# Patient Record
Sex: MALE | Race: WHITE | Employment: OTHER | URBAN - METROPOLITAN AREA
[De-identification: names, ages, dates, MRNs, and addresses within clinical notes are randomized per-mention and may not be internally consistent; named-entity substitution may affect disease eponyms.]

---

## 2017-04-19 ENCOUNTER — TELEPHONE (OUTPATIENT)
Dept: FAMILY MEDICINE CLINIC | Age: 72
End: 2017-04-19

## 2018-03-31 ENCOUNTER — HOSPITAL ENCOUNTER (OUTPATIENT)
Dept: CT IMAGING | Age: 73
Discharge: HOME | End: 2018-03-31
Attending: OTOLARYNGOLOGY | Admitting: OTOLARYNGOLOGY

## 2018-03-31 DIAGNOSIS — H93.A9: ICD-10-CM

## 2018-03-31 DIAGNOSIS — H93.19 TINNITUS: ICD-10-CM

## 2019-02-15 ENCOUNTER — OFFICE VISIT (OUTPATIENT)
Dept: ORTHOPEDIC SURGERY | Age: 74
End: 2019-02-15
Payer: MEDICARE

## 2019-02-15 VITALS
SYSTOLIC BLOOD PRESSURE: 131 MMHG | WEIGHT: 315 LBS | HEART RATE: 91 BPM | DIASTOLIC BLOOD PRESSURE: 76 MMHG | BODY MASS INDEX: 44.1 KG/M2 | HEIGHT: 71 IN

## 2019-02-15 DIAGNOSIS — M17.11 PRIMARY OSTEOARTHRITIS OF RIGHT KNEE: Primary | ICD-10-CM

## 2019-02-15 PROCEDURE — 99203 OFFICE O/P NEW LOW 30 MIN: CPT | Performed by: ORTHOPAEDIC SURGERY

## 2019-12-28 ENCOUNTER — APPOINTMENT (OUTPATIENT)
Dept: GENERAL RADIOLOGY | Age: 74
End: 2019-12-28
Payer: MEDICARE

## 2019-12-28 ENCOUNTER — HOSPITAL ENCOUNTER (EMERGENCY)
Age: 74
Discharge: HOME | End: 2019-12-28
Attending: EMERGENCY MEDICINE
Payer: MEDICARE

## 2019-12-28 VITALS
OXYGEN SATURATION: 99 % | BODY MASS INDEX: 43.4 KG/M2 | HEART RATE: 85 BPM | HEIGHT: 71 IN | DIASTOLIC BLOOD PRESSURE: 47 MMHG | SYSTOLIC BLOOD PRESSURE: 136 MMHG | RESPIRATION RATE: 20 BRPM | WEIGHT: 310 LBS | TEMPERATURE: 97.4 F

## 2019-12-28 DIAGNOSIS — M84.48XA PATHOLOGICAL FRACTURE OF OTHER SITE, UNSPECIFIED PATHOLOGICAL CAUSE, INITIAL ENCOUNTER: ICD-10-CM

## 2019-12-28 DIAGNOSIS — S42.012A: Primary | ICD-10-CM

## 2019-12-28 PROCEDURE — 71101 X-RAY EXAM UNILAT RIBS/CHEST: CPT

## 2019-12-28 PROCEDURE — 6360000002 HC RX W HCPCS: Performed by: EMERGENCY MEDICINE

## 2019-12-28 PROCEDURE — 96372 THER/PROPH/DIAG INJ SC/IM: CPT

## 2019-12-28 PROCEDURE — 73000 X-RAY EXAM OF COLLAR BONE: CPT

## 2019-12-28 PROCEDURE — 99283 EMERGENCY DEPT VISIT LOW MDM: CPT

## 2019-12-28 RX ORDER — KETOROLAC TROMETHAMINE 30 MG/ML
60 INJECTION, SOLUTION INTRAMUSCULAR; INTRAVENOUS ONCE
Status: COMPLETED | OUTPATIENT
Start: 2019-12-28 | End: 2019-12-28

## 2019-12-28 RX ORDER — ACETAMINOPHEN AND CODEINE PHOSPHATE 300; 30 MG/1; MG/1
1-2 TABLET ORAL EVERY 6 HOURS PRN
Qty: 25 TABLET | Refills: 0 | Status: SHIPPED | OUTPATIENT
Start: 2019-12-28 | End: 2020-01-11

## 2019-12-28 RX ADMIN — KETOROLAC TROMETHAMINE 60 MG: 30 INJECTION, SOLUTION INTRAMUSCULAR at 15:56

## 2019-12-28 ASSESSMENT — PAIN SCALES - GENERAL
PAINLEVEL_OUTOF10: 8
PAINLEVEL_OUTOF10: 8

## 2019-12-28 ASSESSMENT — PAIN DESCRIPTION - LOCATION: LOCATION: SHOULDER

## 2019-12-28 ASSESSMENT — PAIN DESCRIPTION - ORIENTATION: ORIENTATION: LEFT

## 2020-04-09 ENCOUNTER — HOSPITAL ENCOUNTER (EMERGENCY)
Age: 75
Discharge: HOME | End: 2020-04-09
Attending: EMERGENCY MEDICINE
Payer: MEDICARE

## 2020-04-09 VITALS
WEIGHT: 315 LBS | OXYGEN SATURATION: 99 % | DIASTOLIC BLOOD PRESSURE: 43 MMHG | RESPIRATION RATE: 17 BRPM | TEMPERATURE: 97.7 F | BODY MASS INDEX: 43.96 KG/M2 | HEART RATE: 87 BPM | SYSTOLIC BLOOD PRESSURE: 97 MMHG

## 2020-04-09 LAB
BILIRUBIN URINE: NEGATIVE
BLOOD, URINE: NEGATIVE
CLARITY: CLEAR
COLOR: YELLOW
EPITHELIAL CELLS, UA: NORMAL /HPF (ref 0–5)
GLUCOSE URINE: NEGATIVE MG/DL
KETONES, URINE: NEGATIVE MG/DL
LEUKOCYTE ESTERASE, URINE: ABNORMAL
MICROSCOPIC EXAMINATION: YES
NITRITE, URINE: NEGATIVE
PH UA: 5 (ref 5–8)
PROTEIN UA: NEGATIVE MG/DL
RBC UA: NORMAL /HPF (ref 0–4)
SPECIFIC GRAVITY UA: 1.02 (ref 1–1.03)
URINE TYPE: ABNORMAL
UROBILINOGEN, URINE: 0.2 E.U./DL
WBC UA: NORMAL /HPF (ref 0–5)

## 2020-04-09 PROCEDURE — 6370000000 HC RX 637 (ALT 250 FOR IP): Performed by: EMERGENCY MEDICINE

## 2020-04-09 PROCEDURE — 99283 EMERGENCY DEPT VISIT LOW MDM: CPT

## 2020-04-09 PROCEDURE — 81001 URINALYSIS AUTO W/SCOPE: CPT

## 2020-04-09 RX ORDER — PREDNISONE 20 MG/1
60 TABLET ORAL ONCE
Status: COMPLETED | OUTPATIENT
Start: 2020-04-09 | End: 2020-04-09

## 2020-04-09 RX ORDER — CYCLOBENZAPRINE HYDROCHLORIDE 7.5 MG/1
7.5 TABLET, FILM COATED ORAL 3 TIMES DAILY PRN
Qty: 30 TABLET | Refills: 0 | Status: SHIPPED | OUTPATIENT
Start: 2020-04-09 | End: 2020-04-19

## 2020-04-09 RX ORDER — LIDOCAINE 50 MG/G
1 PATCH TOPICAL DAILY
Qty: 15 PATCH | Refills: 1 | Status: SHIPPED | OUTPATIENT
Start: 2020-04-09 | End: 2020-04-24

## 2020-04-09 RX ORDER — METHYLPREDNISOLONE 4 MG/1
TABLET ORAL
Qty: 1 KIT | Refills: 0 | Status: SHIPPED | OUTPATIENT
Start: 2020-04-09 | End: 2021-05-14

## 2020-04-09 RX ADMIN — PREDNISONE 60 MG: 20 TABLET ORAL at 11:20

## 2020-04-09 ASSESSMENT — PAIN DESCRIPTION - PAIN TYPE
TYPE: ACUTE PAIN;CHRONIC PAIN
TYPE: ACUTE PAIN

## 2020-04-09 ASSESSMENT — PAIN DESCRIPTION - LOCATION
LOCATION: BACK
LOCATION: BACK

## 2020-04-09 ASSESSMENT — PAIN SCALES - GENERAL
PAINLEVEL_OUTOF10: 7
PAINLEVEL_OUTOF10: 6

## 2020-04-09 ASSESSMENT — PAIN DESCRIPTION - DESCRIPTORS: DESCRIPTORS: ACHING

## 2020-10-06 ENCOUNTER — HOSPITAL ENCOUNTER (EMERGENCY)
Age: 75
Discharge: HOME | End: 2020-10-06
Attending: EMERGENCY MEDICINE
Payer: MEDICARE

## 2020-10-06 VITALS
RESPIRATION RATE: 16 BRPM | HEART RATE: 89 BPM | DIASTOLIC BLOOD PRESSURE: 56 MMHG | OXYGEN SATURATION: 95 % | SYSTOLIC BLOOD PRESSURE: 101 MMHG | TEMPERATURE: 98.3 F | BODY MASS INDEX: 43.24 KG/M2 | WEIGHT: 310 LBS

## 2020-10-06 PROCEDURE — 90471 IMMUNIZATION ADMIN: CPT | Performed by: EMERGENCY MEDICINE

## 2020-10-06 PROCEDURE — 90715 TDAP VACCINE 7 YRS/> IM: CPT | Performed by: EMERGENCY MEDICINE

## 2020-10-06 PROCEDURE — 99283 EMERGENCY DEPT VISIT LOW MDM: CPT

## 2020-10-06 PROCEDURE — 6360000002 HC RX W HCPCS: Performed by: EMERGENCY MEDICINE

## 2020-10-06 RX ADMIN — TETANUS TOXOID, REDUCED DIPHTHERIA TOXOID AND ACELLULAR PERTUSSIS VACCINE, ADSORBED 0.5 ML: 5; 2.5; 8; 8; 2.5 SUSPENSION INTRAMUSCULAR at 16:49

## 2020-10-06 ASSESSMENT — PAIN DESCRIPTION - LOCATION: LOCATION: ARM

## 2020-10-06 ASSESSMENT — PAIN DESCRIPTION - PAIN TYPE: TYPE: ACUTE PAIN

## 2020-10-06 ASSESSMENT — PAIN DESCRIPTION - ORIENTATION: ORIENTATION: LEFT

## 2020-10-06 NOTE — ED NOTES
Pt states understanding of d/c instructions. Pt alert and oriented with steady gait upon discharge.       Gilberto Cox RN  10/06/20 0881

## 2020-10-06 NOTE — ED PROVIDER NOTES
Pulse: 89   Resp: 16   Temp: 98.3 °F (36.8 °C)   SpO2: 95%       GENERAL: Patient is well-developed, well-nourished,  no acute distress. Mild apparent discomfort. Non toxic appearing. HEENT:  Normocephalic, atraumatic. PERRL. Conjunctiva appear normal.  External ears are normal.  MMM  NECK: Supple with normal ROM. Trachea midline  LUNGS:  Normal work of breathing. Speaking comfortably in full sentences. EXTREMITIES: 2+ distal pulses w/o edema. MUSCULOSKELETAL:  Atraumatic extremities with normal ROM grossly. No obvious bony deformities. SKIN: Large skin tear noted to left forearm approximately 6 x 8 cm. Jagged edges. Otherwise, warm/dry. No rashes/lesions noted. PSYCHIATRIC: Patient is alert and oriented with normal affect  NEUROLOGIC: Cranial nerves grossly intact. Moves all extremities with equal strength. No gross sensory deficits. Answers questions/follows commands appropriately. ED COURSE/MDM  Nursing notes reviewed. Pt was given the following medications or treatments in the ED:       Wound care: Wound was cleaned by ER tech. Partially avulsed skin flaps were approximated by myself as best possible prior to Mepitel placement by ER tech. Clinical Impression  Based on the presenting complaint, history, and physical exam, multiple diagnoses were considered. Exam and workup here most c/w:  1. Fall on same level from tripping    2. Skin tear of left forearm without complication, initial encounter        I discussed with Raquel Brand the results of evaluation in the ED, diagnosis, care, and prognosis. The plan is to discharge to home. Patient is in agreement with plan and questions have been answered. I also discussed with Raquel Brand the reasons which may require a return visit and the importance of follow-up care. The patient is well-appearing, nontoxic, and improved at the time of discharge. Patient agrees to call to arrange follow-up care as directed.    Raquel Brand understands to return immediately for worsening/change in symptoms. Patient will be started on the following medications from the ED:  Discharge Medication List as of 10/6/2020  5:03 PM            Disposition  Pt is discharged in stable condition.     Disposition Vitals:  BP (!) 101/56   Pulse 89   Temp 98.3 °F (36.8 °C) (Oral)   Resp 16   Wt (!) 310 lb (140.6 kg)   SpO2 95%   BMI 43.24 kg/m²                     Konrad Mayorga DO  10/06/20 0897

## 2020-10-06 NOTE — ED NOTES
Cleaned left arm skin tears with hibiclens and saline. RT then placed Mepitel, non adherent gauze, and kurlex on left arm. Pt. Tolerated well.       Mono Sorto, JONATHON  10/06/20 3395

## 2020-12-03 ENCOUNTER — HOSPITAL ENCOUNTER (OUTPATIENT)
Dept: MRI IMAGING | Age: 75
Discharge: HOME | End: 2020-12-03
Payer: MEDICARE

## 2020-12-03 PROCEDURE — 72148 MRI LUMBAR SPINE W/O DYE: CPT

## 2020-12-30 ENCOUNTER — HOSPITAL ENCOUNTER (EMERGENCY)
Age: 75
Discharge: HOME | End: 2020-12-30
Attending: EMERGENCY MEDICINE
Payer: MEDICARE

## 2020-12-30 VITALS
DIASTOLIC BLOOD PRESSURE: 55 MMHG | WEIGHT: 310 LBS | OXYGEN SATURATION: 100 % | TEMPERATURE: 97.8 F | SYSTOLIC BLOOD PRESSURE: 122 MMHG | BODY MASS INDEX: 43.4 KG/M2 | HEART RATE: 91 BPM | HEIGHT: 71 IN | RESPIRATION RATE: 22 BRPM

## 2020-12-30 PROCEDURE — 10060 I&D ABSCESS SIMPLE/SINGLE: CPT

## 2020-12-30 PROCEDURE — 99283 EMERGENCY DEPT VISIT LOW MDM: CPT

## 2020-12-30 RX ORDER — DOXYCYCLINE HYCLATE 100 MG
100 TABLET ORAL 2 TIMES DAILY
Qty: 14 TABLET | Refills: 0 | Status: SHIPPED | OUTPATIENT
Start: 2020-12-30 | End: 2021-01-06

## 2020-12-30 RX ORDER — PIOGLITAZONEHYDROCHLORIDE 45 MG/1
TABLET ORAL
COMMUNITY
Start: 2020-01-14

## 2020-12-30 ASSESSMENT — PAIN SCALES - GENERAL: PAINLEVEL_OUTOF10: 6

## 2020-12-30 ASSESSMENT — PAIN DESCRIPTION - PAIN TYPE: TYPE: ACUTE PAIN

## 2020-12-30 ASSESSMENT — PAIN DESCRIPTION - DESCRIPTORS: DESCRIPTORS: SORE

## 2020-12-30 ASSESSMENT — PAIN DESCRIPTION - LOCATION: LOCATION: HEAD

## 2020-12-30 ASSESSMENT — PAIN DESCRIPTION - ORIENTATION: ORIENTATION: LEFT;POSTERIOR

## 2020-12-30 NOTE — ED PROVIDER NOTES
methylPREDNISolone (MEDROL, DANII,) 4 MG tablet Take with food as directed until gone 4/9/20   Wilton MD Milka   NONFORMULARY     Historical Provider, MD       No Known Allergies    Social history:  reports that he has never smoked. He has never used smokeless tobacco. He reports that he does not drink alcohol. Family history:    Family History   Problem Relation Age of Onset    Arthritis Other     Diabetes Other        REVIEW OF SYSTEMS  6 systems reviewed, pertinent positives per HPI otherwise noted to be negative    PHYSICAL EXAM  Vitals:    12/30/20 1107   BP: (!) 122/55   Pulse:    Resp:    Temp:    SpO2:        GENERAL: Patient is well-developed, well-nourished,  no acute distress. Moderate apparent discomfort. Non toxic appearing. HEENT:  Normocephalic, atraumatic. PERRL. Conjunctiva appear normal.  External ears are normal.  MMM  NECK: Supple with normal ROM. Trachea midline  LUNGS:  Normal work of breathing. Speaking comfortably in full sentences. EXTREMITIES: 2+ distal pulses w/o edema. MUSCULOSKELETAL:  Atraumatic extremities with normal ROM grossly. No obvious bony deformities. SKIN: Mild inflammation along the left occiput. Small lesion that appears to be inflamed/infected cyst less than 1 cm. Otherwise, warm/dry. No rashes/lesions noted. PSYCHIATRIC: Patient is alert and oriented with normal affect  NEUROLOGIC: Cranial nerves grossly intact. Moves all extremities with equal strength. No gross sensory deficits. Answers questions/follows commands appropriately. ED COURSE/MDM  Nursing notes reviewed.   Pt was given the following medications or treatments in the ED:         PROCEDURE:  Stephanie Nolasco [address] Vinod Pena or their surrogate had an opportunity to ask questions, and the risks, benefits, and alternatives were discussed. The abscess was prepped and draped to maintain a sterile field. A local anesthetic was used to completely anesthetize the abscess. A stab incision was made at the site to allow for draining. There were no complications during the procedure. I discussed with patient that exam is consistent with an infected hair/infected cyst at the site. This was opened to allow for drainage. However, patient was encouraged to continue to monitor as symptoms may worsen and etiology may be reflective of shingles, though I suspect this is unlikely. Clinical Impression  Based on the presenting complaint, history, and physical exam, multiple diagnoses were considered. Exam and workup here most c/w:  1. Scalp abscess        I discussed with Vinodgregorio Pena the results of evaluation in the ED, diagnosis, care, and prognosis. The plan is to discharge to home. Patient is in agreement with plan and questions have been answered. I also discussed with Vinod Pena the reasons which may require a return visit and the importance of follow-up care. The patient is well-appearing, nontoxic, and improved at the time of discharge. Patient agrees to call to arrange follow-up care as directed. Vinod Pena understands to return immediately for worsening/change in symptoms. Patient will be started on the following medications from the ED:  Discharge Medication List as of 12/30/2020 11:43 AM      START taking these medications    Details   doxycycline hyclate (VIBRA-TABS) 100 MG tablet Take 1 tablet by mouth 2 times daily for 7 days, Disp-14 tablet, R-0Normal               Disposition  Pt is discharged in stable condition.     Disposition Vitals:  BP (!) 122/55   Pulse 91   Temp 97.8 °F (36.6 °C) (Oral)   Resp 22   Ht 5' 11\" (1.803 m)   Wt (!) 310 lb (140.6 kg)   SpO2 100%   BMI 43.24 kg/m² Geovanny Cee, DO  12/30/20 78 Monae Ashton, DO  12/30/20 1219

## 2021-05-14 ENCOUNTER — HOSPITAL ENCOUNTER (EMERGENCY)
Age: 76
Discharge: HOME | End: 2021-05-14
Attending: EMERGENCY MEDICINE
Payer: MEDICARE

## 2021-05-14 VITALS
OXYGEN SATURATION: 100 % | RESPIRATION RATE: 18 BRPM | SYSTOLIC BLOOD PRESSURE: 164 MMHG | BODY MASS INDEX: 46.03 KG/M2 | WEIGHT: 315 LBS | DIASTOLIC BLOOD PRESSURE: 83 MMHG | HEART RATE: 94 BPM | TEMPERATURE: 97.8 F

## 2021-05-14 DIAGNOSIS — Z71.1 FEARED CONDITION NOT DEMONSTRATED: ICD-10-CM

## 2021-05-14 DIAGNOSIS — N30.00 ACUTE CYSTITIS WITHOUT HEMATURIA: ICD-10-CM

## 2021-05-14 DIAGNOSIS — F51.5 NIGHTMARES: Primary | ICD-10-CM

## 2021-05-14 LAB
AMPHETAMINE SCREEN, URINE: ABNORMAL
BACTERIA: ABNORMAL /HPF
BARBITURATE SCREEN URINE: ABNORMAL
BENZODIAZEPINE SCREEN, URINE: ABNORMAL
BILIRUBIN URINE: NEGATIVE
BLOOD, URINE: NEGATIVE
C. TRACHOMATIS DNA ,URINE: NEGATIVE
CANNABINOID SCREEN URINE: ABNORMAL
CLARITY: CLEAR
COCAINE METABOLITE SCREEN URINE: ABNORMAL
COLOR: YELLOW
EPITHELIAL CELLS, UA: ABNORMAL /HPF (ref 0–5)
GLUCOSE BLD-MCNC: 140 MG/DL (ref 70–99)
GLUCOSE URINE: NEGATIVE MG/DL
KETONES, URINE: NEGATIVE MG/DL
LEUKOCYTE ESTERASE, URINE: ABNORMAL
Lab: ABNORMAL
METHADONE SCREEN, URINE: ABNORMAL
MICROSCOPIC EXAMINATION: YES
N. GONORRHOEAE DNA, URINE: NEGATIVE
NITRITE, URINE: POSITIVE
OPIATE SCREEN URINE: ABNORMAL
OXYCODONE URINE: POSITIVE
PERFORMED ON: ABNORMAL
PH UA: 6
PH UA: 6 (ref 5–8)
PHENCYCLIDINE SCREEN URINE: ABNORMAL
PROPOXYPHENE SCREEN: ABNORMAL
PROTEIN UA: NEGATIVE MG/DL
RBC UA: ABNORMAL /HPF (ref 0–4)
SPECIFIC GRAVITY UA: 1.02 (ref 1–1.03)
URINE REFLEX TO CULTURE: YES
URINE TYPE: ABNORMAL
UROBILINOGEN, URINE: 0.2 E.U./DL
WBC UA: ABNORMAL /HPF (ref 0–5)

## 2021-05-14 PROCEDURE — 87077 CULTURE AEROBIC IDENTIFY: CPT

## 2021-05-14 PROCEDURE — 80307 DRUG TEST PRSMV CHEM ANLYZR: CPT

## 2021-05-14 PROCEDURE — 81001 URINALYSIS AUTO W/SCOPE: CPT

## 2021-05-14 PROCEDURE — 87186 SC STD MICRODIL/AGAR DIL: CPT

## 2021-05-14 PROCEDURE — 87086 URINE CULTURE/COLONY COUNT: CPT

## 2021-05-14 PROCEDURE — 99282 EMERGENCY DEPT VISIT SF MDM: CPT

## 2021-05-14 PROCEDURE — 87591 N.GONORRHOEAE DNA AMP PROB: CPT

## 2021-05-14 PROCEDURE — 87491 CHLMYD TRACH DNA AMP PROBE: CPT

## 2021-05-14 RX ORDER — CEPHALEXIN 500 MG/1
500 CAPSULE ORAL 4 TIMES DAILY
Qty: 40 CAPSULE | Refills: 0 | Status: SHIPPED | OUTPATIENT
Start: 2021-05-14 | End: 2021-05-24

## 2021-05-14 NOTE — ED PROVIDER NOTES
asymptomatic when he awoke this morning. No other complaints, modifying factors or associated symptoms. Nursing notes reviewed. Past Medical History:   Diagnosis Date    Diabetes mellitus ([address]     Hyperlipidemia     Hypertension     Thyroid disease      Past Surgical History:   Procedure Laterality Date    ANKLE SURGERY Left     JOINT REPLACEMENT Bilateral     knee replacements    KNEE SURGERY Right      Family History   Problem Relation Age of Onset    Arthritis Other     Diabetes Other      Social History     Socioeconomic History    Marital status:      Spouse name: Not on file    Number of children: Not on file    Years of education: Not on file    Highest education level: Not on file   Occupational History    Not on file   Social Needs    Financial resource strain: Not on file    Food insecurity     Worry: Not on file     Inability: Not on file    Transportation needs     Medical: Not on file     Non-medical: Not on file   Tobacco Use    Smoking status: Never Smoker    Smokeless tobacco: Never Used   Substance and Sexual Activity    Alcohol use: No    Drug use: Not on file    Sexual activity: Not Currently   Lifestyle    Physical activity     Days per week: Not on file     Minutes per session: Not on file    Stress: Not on file   Relationships    Social connections     Talks on phone: Not on file     Gets together: Not on file     Attends Scientology service: Not on file     Active member of club or organization: Not on file     Attends meetings of clubs or organizations: Not on file     Relationship status: Not on file    Intimate partner violence     Fear of current or ex partner: Not on file     Emotionally abused: Not on file     Physically abused: Not on file     Forced sexual activity: Not on file   Other Topics Concern    Not on file   Social History Narrative    Not on file     No current facility-administered medications for this encounter.       Current Outpatient Medications   Medication Sig Dispense Refill    cephALEXin (KEFLEX) 500 MG capsule Take 1 capsule by mouth 4 times daily for 10 days 40 capsule 0    pioglitazone (ACTOS) 45 MG tablet TAKE 1 TABLET DAILY      oxyCODONE-Acetaminophen (PERCOCET PO) Take by mouth      LOSARTAN POTASSIUM PO Take by mouth      ATORVASTATIN CALCIUM PO Take by mouth      Levothyroxine Sodium (SYNTHROID PO) Take by mouth      MELOXICAM PO Take by mouth      ibuprofen (ADVIL;MOTRIN) 800 MG tablet Take 1 tablet by mouth every 8 hours as needed for Pain 40 tablet 0    NONFORMULARY        No Known Allergies    REVIEW OF SYSTEMS  6 systems reviewed, pertinent positives per HPI otherwise noted to be negative    PHYSICAL EXAM   BP (!) 164/83   Pulse 94   Temp 97.8 °F (36.6 °C) (Oral)   Resp 18   Wt (!) 330 lb (149.7 kg)   SpO2 100%   BMI 46.03 kg/m²    GENERAL APPEARANCE: Awake and alert. Cooperative. No acute distress. HEAD: Normocephalic. Atraumatic. EYES: PERRL. EOM's grossly intact. ENT: Mucous membranes are moist.   NECK: Supple. Normal ROM. CHEST: Equal symmetric chest rise. RRR  LUNGS: Breathing is unlabored. Speaking comfortably in full sentences. CTAB  ABDOMEN: Nondistended, nontender  EXTREMITIES: MAEE. No acute deformities. SKIN: Warm and dry. No acute rashes. NEUROLOGICAL: Alert and oriented. Strength is 5/5 in all extremities and sensation is intact. Gait normal.    LABS  I have reviewed all labs for this visit.    Results for orders placed or performed during the hospital encounter of 05/14/21   Urine Drug Screen   Result Value Ref Range    Amphetamine Screen, Urine Neg Negative <1000ng/mL    Barbiturate Screen, Ur Neg Negative <200 ng/mL    Benzodiazepine Screen, Urine Neg Negative <200 ng/mL    Cannabinoid Scrn, Ur Neg Negative <50 ng/mL    Cocaine Metabolite Screen, Urine Neg Negative <300 ng/mL    Opiate Scrn, Ur Neg Negative <300 ng/mL    PCP Screen, Urine Neg Negative <25 ng/mL    Methadone Screen, Urine Neg Negative <300 ng/mL    Propoxyphene Scrn, Ur Neg Negative <300 ng/mL    Oxycodone Urine POSITIVE (A) Negative <100 ng/ml    pH, UA 6.0     Drug Screen Comment: see below    Urinalysis Reflex to Culture    Specimen: Urine, clean catch   Result Value Ref Range    Color, UA Yellow Straw/Yellow    Clarity, UA Clear Clear    Glucose, Ur Negative Negative mg/dL    Bilirubin Urine Negative Negative    Ketones, Urine Negative Negative mg/dL    Specific Gravity, UA 1.020 1.005 - 1.030    Blood, Urine Negative Negative    pH, UA 6.0 5.0 - 8.0    Protein, UA Negative Negative mg/dL    Urobilinogen, Urine 0.2 <2.0 E.U./dL    Nitrite, Urine POSITIVE (A) Negative    Leukocyte Esterase, Urine SMALL (A) Negative    Microscopic Examination YES     Urine Type NotGiven     Urine Reflex to Culture Yes    Microscopic Urinalysis   Result Value Ref Range    WBC, UA  (A) 0 - 5 /HPF    RBC, UA 0-2 0 - 4 /HPF    Epithelial Cells, UA 0-1 0 - 5 /HPF    Bacteria, UA 4+ (A) None Seen /HPF   POCT Glucose   Result Value Ref Range    POC Glucose 140 (H) 70 - 99 mg/dl    Performed on ACCU-CHEK        ED COURSE/MDM  The patient's ED course was notable for nightmares while sleeping which he thought may be related to inadvertent use of some medication or drug other than what is prescribed to him. Tox screen only with intended oxycodone. Nothing suggests a concerning toxidrome currently. States his pill counts are correct and nothing else looks unusual to him. VS reassuring except mild hypertension. In review of his UA which looks infected, he tells me retrospectively that he \"maybe\" has some urinary symptoms of urgency. No dysuria or hematuria, abd pain, or testicular pain though. He thought it was a prostate issue. Not concerned about penile discharge or STD. In retrospect he would not have complained originally but does think the UA may be real.  I will prescribe him Keflex.   GC/CT added on but low suspicion so will

## 2021-05-14 NOTE — ED TRIAGE NOTES
Pt states his girlfriend is letting her son stay with them and he is a drug addict and feels he may have messed with his medication because he had hallucinations and nightmares last night and is concerned

## 2021-05-16 LAB
ORGANISM: ABNORMAL
URINE CULTURE, ROUTINE: ABNORMAL

## 2021-07-14 ENCOUNTER — HOSPITAL ENCOUNTER (INPATIENT)
Age: 76
LOS: 5 days | Discharge: HOME HEALTH | DRG: 291 | End: 2021-07-19
Attending: EMERGENCY MEDICINE | Admitting: INTERNAL MEDICINE
Payer: MEDICARE

## 2021-07-14 ENCOUNTER — APPOINTMENT (OUTPATIENT)
Dept: GENERAL RADIOLOGY | Age: 76
DRG: 291 | End: 2021-07-14
Payer: MEDICARE

## 2021-07-14 DIAGNOSIS — I50.9 CONGESTIVE HEART FAILURE, UNSPECIFIED HF CHRONICITY, UNSPECIFIED HEART FAILURE TYPE (HCC): ICD-10-CM

## 2021-07-14 DIAGNOSIS — I48.19 PERSISTENT ATRIAL FIBRILLATION (HCC): Primary | ICD-10-CM

## 2021-07-14 DIAGNOSIS — R77.8 ELEVATED TROPONIN: ICD-10-CM

## 2021-07-14 PROBLEM — I48.91 ATRIAL FIBRILLATION WITH RVR (HCC): Status: ACTIVE | Noted: 2021-07-14

## 2021-07-14 LAB
A/G RATIO: 1.4 (ref 1.1–2.2)
ALBUMIN SERPL-MCNC: 4 G/DL (ref 3.4–5)
ALP BLD-CCNC: 61 U/L (ref 40–129)
ALT SERPL-CCNC: 11 U/L (ref 10–40)
ANION GAP SERPL CALCULATED.3IONS-SCNC: 10 MMOL/L (ref 3–16)
AST SERPL-CCNC: 29 U/L (ref 15–37)
BASOPHILS ABSOLUTE: 0.2 K/UL (ref 0–0.2)
BASOPHILS RELATIVE PERCENT: 5.1 %
BILIRUB SERPL-MCNC: 0.5 MG/DL (ref 0–1)
BILIRUBIN URINE: NEGATIVE
BLOOD, URINE: NEGATIVE
BUN BLDV-MCNC: 54 MG/DL (ref 7–20)
CALCIUM SERPL-MCNC: 9.1 MG/DL (ref 8.3–10.6)
CHLORIDE BLD-SCNC: 100 MMOL/L (ref 99–110)
CLARITY: CLEAR
CO2: 25 MMOL/L (ref 21–32)
COLOR: YELLOW
CREAT SERPL-MCNC: 1.6 MG/DL (ref 0.8–1.3)
EOSINOPHILS ABSOLUTE: 0.1 K/UL (ref 0–0.6)
EOSINOPHILS RELATIVE PERCENT: 2.8 %
GFR AFRICAN AMERICAN: 51
GFR NON-AFRICAN AMERICAN: 42
GLOBULIN: 2.8 G/DL
GLUCOSE BLD-MCNC: 146 MG/DL (ref 70–99)
GLUCOSE URINE: NEGATIVE MG/DL
HCT VFR BLD CALC: 34.4 % (ref 40.5–52.5)
HEMOGLOBIN: 11.6 G/DL (ref 13.5–17.5)
KETONES, URINE: NEGATIVE MG/DL
LEUKOCYTE ESTERASE, URINE: NEGATIVE
LYMPHOCYTES ABSOLUTE: 1 K/UL (ref 1–5.1)
LYMPHOCYTES RELATIVE PERCENT: 26 %
MCH RBC QN AUTO: 34.6 PG (ref 26–34)
MCHC RBC AUTO-ENTMCNC: 33.7 G/DL (ref 31–36)
MCV RBC AUTO: 102.9 FL (ref 80–100)
MICROSCOPIC EXAMINATION: NORMAL
MONOCYTES ABSOLUTE: 0.4 K/UL (ref 0–1.3)
MONOCYTES RELATIVE PERCENT: 9.9 %
NEUTROPHILS ABSOLUTE: 2.2 K/UL (ref 1.7–7.7)
NEUTROPHILS RELATIVE PERCENT: 56.2 %
NITRITE, URINE: NEGATIVE
PDW BLD-RTO: 13.2 % (ref 12.4–15.4)
PH UA: 5 (ref 5–8)
PLATELET # BLD: 173 K/UL (ref 135–450)
PMV BLD AUTO: 9 FL (ref 5–10.5)
POTASSIUM REFLEX MAGNESIUM: 5.5 MMOL/L (ref 3.5–5.1)
PRO-BNP: 3018 PG/ML (ref 0–449)
PROTEIN UA: NEGATIVE MG/DL
RBC # BLD: 3.35 M/UL (ref 4.2–5.9)
SODIUM BLD-SCNC: 135 MMOL/L (ref 136–145)
SPECIFIC GRAVITY UA: 1.02 (ref 1–1.03)
TOTAL PROTEIN: 6.8 G/DL (ref 6.4–8.2)
TROPONIN: 0.04 NG/ML
URINE REFLEX TO CULTURE: NORMAL
URINE TYPE: NORMAL
UROBILINOGEN, URINE: 0.2 E.U./DL
WBC # BLD: 4 K/UL (ref 4–11)

## 2021-07-14 PROCEDURE — 2700000000 HC OXYGEN THERAPY PER DAY

## 2021-07-14 PROCEDURE — 71045 X-RAY EXAM CHEST 1 VIEW: CPT

## 2021-07-14 PROCEDURE — 2580000003 HC RX 258: Performed by: EMERGENCY MEDICINE

## 2021-07-14 PROCEDURE — 85025 COMPLETE CBC W/AUTO DIFF WBC: CPT

## 2021-07-14 PROCEDURE — 84484 ASSAY OF TROPONIN QUANT: CPT

## 2021-07-14 PROCEDURE — 83880 ASSAY OF NATRIURETIC PEPTIDE: CPT

## 2021-07-14 PROCEDURE — 2500000003 HC RX 250 WO HCPCS: Performed by: EMERGENCY MEDICINE

## 2021-07-14 PROCEDURE — 2060000000 HC ICU INTERMEDIATE R&B

## 2021-07-14 PROCEDURE — 96374 THER/PROPH/DIAG INJ IV PUSH: CPT

## 2021-07-14 PROCEDURE — 81003 URINALYSIS AUTO W/O SCOPE: CPT

## 2021-07-14 PROCEDURE — 80053 COMPREHEN METABOLIC PANEL: CPT

## 2021-07-14 PROCEDURE — 99283 EMERGENCY DEPT VISIT LOW MDM: CPT

## 2021-07-14 PROCEDURE — 93005 ELECTROCARDIOGRAM TRACING: CPT | Performed by: EMERGENCY MEDICINE

## 2021-07-14 RX ORDER — 0.9 % SODIUM CHLORIDE 0.9 %
250 INTRAVENOUS SOLUTION INTRAVENOUS ONCE
Status: COMPLETED | OUTPATIENT
Start: 2021-07-14 | End: 2021-07-14

## 2021-07-14 RX ORDER — DILTIAZEM HYDROCHLORIDE 5 MG/ML
20 INJECTION INTRAVENOUS ONCE
Status: COMPLETED | OUTPATIENT
Start: 2021-07-14 | End: 2021-07-14

## 2021-07-14 RX ORDER — FUROSEMIDE 40 MG/1
40 TABLET ORAL 2 TIMES DAILY
Status: ON HOLD | COMMUNITY
End: 2021-07-19 | Stop reason: SDUPTHER

## 2021-07-14 RX ADMIN — DILTIAZEM HYDROCHLORIDE 20 MG: 5 INJECTION INTRAVENOUS at 20:17

## 2021-07-14 RX ADMIN — SODIUM CHLORIDE 250 ML: 9 INJECTION, SOLUTION INTRAVENOUS at 22:20

## 2021-07-15 LAB
ANION GAP SERPL CALCULATED.3IONS-SCNC: 10 MMOL/L (ref 3–16)
ANTI-XA UNFRAC HEPARIN: 0.27 IU/ML (ref 0.3–0.7)
APTT: 37.4 SEC (ref 26.2–38.6)
BUN BLDV-MCNC: 49 MG/DL (ref 7–20)
CALCIUM SERPL-MCNC: 8.7 MG/DL (ref 8.3–10.6)
CHLORIDE BLD-SCNC: 100 MMOL/L (ref 99–110)
CO2: 26 MMOL/L (ref 21–32)
CREAT SERPL-MCNC: 1.8 MG/DL (ref 0.8–1.3)
EKG ATRIAL RATE: 141 BPM
EKG ATRIAL RATE: 174 BPM
EKG DIAGNOSIS: NORMAL
EKG DIAGNOSIS: NORMAL
EKG Q-T INTERVAL: 306 MS
EKG Q-T INTERVAL: 338 MS
EKG QRS DURATION: 84 MS
EKG QRS DURATION: 86 MS
EKG QTC CALCULATION (BAZETT): 453 MS
EKG QTC CALCULATION (BAZETT): 519 MS
EKG R AXIS: 46 DEGREES
EKG R AXIS: 54 DEGREES
EKG T AXIS: 222 DEGREES
EKG T AXIS: 68 DEGREES
EKG VENTRICULAR RATE: 132 BPM
EKG VENTRICULAR RATE: 142 BPM
FOLATE: 13.13 NG/ML (ref 4.78–24.2)
GFR AFRICAN AMERICAN: 45
GFR NON-AFRICAN AMERICAN: 37
GLUCOSE BLD-MCNC: 129 MG/DL (ref 70–99)
GLUCOSE BLD-MCNC: 130 MG/DL (ref 70–99)
GLUCOSE BLD-MCNC: 131 MG/DL (ref 70–99)
GLUCOSE BLD-MCNC: 138 MG/DL (ref 70–99)
GLUCOSE BLD-MCNC: 140 MG/DL (ref 70–99)
HCT VFR BLD CALC: 36.3 % (ref 40.5–52.5)
HEMOGLOBIN: 12.4 G/DL (ref 13.5–17.5)
INR BLD: 1.02 (ref 0.88–1.12)
LV EF: 55 %
LVEF MODALITY: NORMAL
MAGNESIUM: 2.2 MG/DL (ref 1.8–2.4)
MCH RBC QN AUTO: 35.3 PG (ref 26–34)
MCHC RBC AUTO-ENTMCNC: 34.2 G/DL (ref 31–36)
MCV RBC AUTO: 103.3 FL (ref 80–100)
PDW BLD-RTO: 13 % (ref 12.4–15.4)
PERFORMED ON: ABNORMAL
PLATELET # BLD: 146 K/UL (ref 135–450)
PMV BLD AUTO: 8.4 FL (ref 5–10.5)
POTASSIUM REFLEX MAGNESIUM: 4.2 MMOL/L (ref 3.5–5.1)
PROTHROMBIN TIME: 11.5 SEC (ref 9.9–12.7)
RBC # BLD: 3.52 M/UL (ref 4.2–5.9)
SODIUM BLD-SCNC: 136 MMOL/L (ref 136–145)
TROPONIN: 0.02 NG/ML
TROPONIN: 0.03 NG/ML
TROPONIN: 0.03 NG/ML
TSH SERPL DL<=0.05 MIU/L-ACNC: 1.78 UIU/ML (ref 0.27–4.2)
VITAMIN B-12: 794 PG/ML (ref 211–911)
WBC # BLD: 3.8 K/UL (ref 4–11)

## 2021-07-15 PROCEDURE — 99223 1ST HOSP IP/OBS HIGH 75: CPT | Performed by: INTERNAL MEDICINE

## 2021-07-15 PROCEDURE — 85730 THROMBOPLASTIN TIME PARTIAL: CPT

## 2021-07-15 PROCEDURE — 6360000002 HC RX W HCPCS: Performed by: INTERNAL MEDICINE

## 2021-07-15 PROCEDURE — 6370000000 HC RX 637 (ALT 250 FOR IP): Performed by: STUDENT IN AN ORGANIZED HEALTH CARE EDUCATION/TRAINING PROGRAM

## 2021-07-15 PROCEDURE — 84443 ASSAY THYROID STIM HORMONE: CPT

## 2021-07-15 PROCEDURE — 93010 ELECTROCARDIOGRAM REPORT: CPT | Performed by: INTERNAL MEDICINE

## 2021-07-15 PROCEDURE — 85610 PROTHROMBIN TIME: CPT

## 2021-07-15 PROCEDURE — 2060000000 HC ICU INTERMEDIATE R&B

## 2021-07-15 PROCEDURE — 83735 ASSAY OF MAGNESIUM: CPT

## 2021-07-15 PROCEDURE — 2580000003 HC RX 258: Performed by: STUDENT IN AN ORGANIZED HEALTH CARE EDUCATION/TRAINING PROGRAM

## 2021-07-15 PROCEDURE — 85027 COMPLETE CBC AUTOMATED: CPT

## 2021-07-15 PROCEDURE — 85520 HEPARIN ASSAY: CPT

## 2021-07-15 PROCEDURE — 80048 BASIC METABOLIC PNL TOTAL CA: CPT

## 2021-07-15 PROCEDURE — 82746 ASSAY OF FOLIC ACID SERUM: CPT

## 2021-07-15 PROCEDURE — 6360000002 HC RX W HCPCS: Performed by: STUDENT IN AN ORGANIZED HEALTH CARE EDUCATION/TRAINING PROGRAM

## 2021-07-15 PROCEDURE — 93306 TTE W/DOPPLER COMPLETE: CPT

## 2021-07-15 PROCEDURE — 6370000000 HC RX 637 (ALT 250 FOR IP): Performed by: INTERNAL MEDICINE

## 2021-07-15 PROCEDURE — 36415 COLL VENOUS BLD VENIPUNCTURE: CPT

## 2021-07-15 PROCEDURE — 82607 VITAMIN B-12: CPT

## 2021-07-15 PROCEDURE — 84484 ASSAY OF TROPONIN QUANT: CPT

## 2021-07-15 PROCEDURE — 93005 ELECTROCARDIOGRAM TRACING: CPT | Performed by: STUDENT IN AN ORGANIZED HEALTH CARE EDUCATION/TRAINING PROGRAM

## 2021-07-15 RX ORDER — ONDANSETRON 4 MG/1
4 TABLET, ORALLY DISINTEGRATING ORAL EVERY 8 HOURS PRN
Status: DISCONTINUED | OUTPATIENT
Start: 2021-07-15 | End: 2021-07-19 | Stop reason: HOSPADM

## 2021-07-15 RX ORDER — PANTOPRAZOLE SODIUM 40 MG/1
40 TABLET, DELAYED RELEASE ORAL
Status: DISCONTINUED | OUTPATIENT
Start: 2021-07-16 | End: 2021-07-19 | Stop reason: HOSPADM

## 2021-07-15 RX ORDER — TRAZODONE HYDROCHLORIDE 50 MG/1
50 TABLET ORAL ONCE
Status: COMPLETED | OUTPATIENT
Start: 2021-07-15 | End: 2021-07-15

## 2021-07-15 RX ORDER — POLYETHYLENE GLYCOL 3350 17 G/17G
17 POWDER, FOR SOLUTION ORAL DAILY PRN
Status: DISCONTINUED | OUTPATIENT
Start: 2021-07-15 | End: 2021-07-19 | Stop reason: HOSPADM

## 2021-07-15 RX ORDER — LEVOTHYROXINE SODIUM 0.07 MG/1
75 TABLET ORAL DAILY
Status: DISCONTINUED | OUTPATIENT
Start: 2021-07-16 | End: 2021-07-19 | Stop reason: HOSPADM

## 2021-07-15 RX ORDER — DEXTROSE MONOHYDRATE 25 G/50ML
12.5 INJECTION, SOLUTION INTRAVENOUS PRN
Status: DISCONTINUED | OUTPATIENT
Start: 2021-07-15 | End: 2021-07-19 | Stop reason: HOSPADM

## 2021-07-15 RX ORDER — HEPARIN SODIUM 10000 [USP'U]/100ML
1000 INJECTION, SOLUTION INTRAVENOUS CONTINUOUS
Status: DISCONTINUED | OUTPATIENT
Start: 2021-07-15 | End: 2021-07-15

## 2021-07-15 RX ORDER — TRAZODONE HYDROCHLORIDE 50 MG/1
50 TABLET ORAL NIGHTLY
Status: DISCONTINUED | OUTPATIENT
Start: 2021-07-15 | End: 2021-07-19 | Stop reason: HOSPADM

## 2021-07-15 RX ORDER — ACETAMINOPHEN 650 MG/1
650 SUPPOSITORY RECTAL EVERY 6 HOURS PRN
Status: DISCONTINUED | OUTPATIENT
Start: 2021-07-15 | End: 2021-07-19 | Stop reason: HOSPADM

## 2021-07-15 RX ORDER — INSULIN LISPRO 100 [IU]/ML
0-3 INJECTION, SOLUTION INTRAVENOUS; SUBCUTANEOUS NIGHTLY
Status: DISCONTINUED | OUTPATIENT
Start: 2021-07-15 | End: 2021-07-19 | Stop reason: HOSPADM

## 2021-07-15 RX ORDER — HEPARIN SODIUM 1000 [USP'U]/ML
2000 INJECTION, SOLUTION INTRAVENOUS; SUBCUTANEOUS PRN
Status: DISCONTINUED | OUTPATIENT
Start: 2021-07-15 | End: 2021-07-16

## 2021-07-15 RX ORDER — HEPARIN SODIUM 1000 [USP'U]/ML
4000 INJECTION, SOLUTION INTRAVENOUS; SUBCUTANEOUS PRN
Status: DISCONTINUED | OUTPATIENT
Start: 2021-07-15 | End: 2021-07-16

## 2021-07-15 RX ORDER — DIGOXIN 0.25 MG/ML
250 INJECTION INTRAMUSCULAR; INTRAVENOUS ONCE
Status: COMPLETED | OUTPATIENT
Start: 2021-07-15 | End: 2021-07-15

## 2021-07-15 RX ORDER — HEPARIN SODIUM 1000 [USP'U]/ML
1000 INJECTION, SOLUTION INTRAVENOUS; SUBCUTANEOUS PRN
Status: DISCONTINUED | OUTPATIENT
Start: 2021-07-15 | End: 2021-07-15

## 2021-07-15 RX ORDER — ACETAMINOPHEN 325 MG/1
650 TABLET ORAL EVERY 6 HOURS PRN
Status: DISCONTINUED | OUTPATIENT
Start: 2021-07-15 | End: 2021-07-19 | Stop reason: HOSPADM

## 2021-07-15 RX ORDER — DEXTROSE MONOHYDRATE 50 MG/ML
100 INJECTION, SOLUTION INTRAVENOUS PRN
Status: DISCONTINUED | OUTPATIENT
Start: 2021-07-15 | End: 2021-07-19 | Stop reason: HOSPADM

## 2021-07-15 RX ORDER — OXYCODONE HYDROCHLORIDE AND ACETAMINOPHEN 5; 325 MG/1; MG/1
1 TABLET ORAL EVERY 4 HOURS PRN
Status: DISCONTINUED | OUTPATIENT
Start: 2021-07-15 | End: 2021-07-19 | Stop reason: HOSPADM

## 2021-07-15 RX ORDER — ONDANSETRON 2 MG/ML
4 INJECTION INTRAMUSCULAR; INTRAVENOUS EVERY 6 HOURS PRN
Status: DISCONTINUED | OUTPATIENT
Start: 2021-07-15 | End: 2021-07-19 | Stop reason: HOSPADM

## 2021-07-15 RX ORDER — INSULIN LISPRO 100 [IU]/ML
0-6 INJECTION, SOLUTION INTRAVENOUS; SUBCUTANEOUS
Status: DISCONTINUED | OUTPATIENT
Start: 2021-07-15 | End: 2021-07-19 | Stop reason: HOSPADM

## 2021-07-15 RX ORDER — HEPARIN SODIUM 1000 [USP'U]/ML
4000 INJECTION, SOLUTION INTRAVENOUS; SUBCUTANEOUS ONCE
Status: COMPLETED | OUTPATIENT
Start: 2021-07-15 | End: 2021-07-15

## 2021-07-15 RX ORDER — HEPARIN SODIUM 5000 [USP'U]/ML
5000 INJECTION, SOLUTION INTRAVENOUS; SUBCUTANEOUS EVERY 8 HOURS SCHEDULED
Status: DISCONTINUED | OUTPATIENT
Start: 2021-07-15 | End: 2021-07-15

## 2021-07-15 RX ORDER — SODIUM CHLORIDE 0.9 % (FLUSH) 0.9 %
5-40 SYRINGE (ML) INJECTION EVERY 12 HOURS SCHEDULED
Status: DISCONTINUED | OUTPATIENT
Start: 2021-07-15 | End: 2021-07-19 | Stop reason: HOSPADM

## 2021-07-15 RX ORDER — SODIUM CHLORIDE 0.9 % (FLUSH) 0.9 %
5-40 SYRINGE (ML) INJECTION PRN
Status: DISCONTINUED | OUTPATIENT
Start: 2021-07-15 | End: 2021-07-19 | Stop reason: HOSPADM

## 2021-07-15 RX ORDER — OXYCODONE HYDROCHLORIDE AND ACETAMINOPHEN 5; 325 MG/1; MG/1
2 TABLET ORAL EVERY 4 HOURS PRN
Status: DISCONTINUED | OUTPATIENT
Start: 2021-07-15 | End: 2021-07-19 | Stop reason: HOSPADM

## 2021-07-15 RX ORDER — TAMSULOSIN HYDROCHLORIDE 0.4 MG/1
0.4 CAPSULE ORAL DAILY
Status: DISCONTINUED | OUTPATIENT
Start: 2021-07-15 | End: 2021-07-19 | Stop reason: HOSPADM

## 2021-07-15 RX ORDER — SODIUM CHLORIDE 9 MG/ML
25 INJECTION, SOLUTION INTRAVENOUS PRN
Status: DISCONTINUED | OUTPATIENT
Start: 2021-07-15 | End: 2021-07-19 | Stop reason: HOSPADM

## 2021-07-15 RX ORDER — NICOTINE POLACRILEX 4 MG
15 LOZENGE BUCCAL PRN
Status: DISCONTINUED | OUTPATIENT
Start: 2021-07-15 | End: 2021-07-19 | Stop reason: HOSPADM

## 2021-07-15 RX ORDER — HEPARIN SODIUM 10000 [USP'U]/100ML
5-30 INJECTION, SOLUTION INTRAVENOUS CONTINUOUS
Status: DISCONTINUED | OUTPATIENT
Start: 2021-07-15 | End: 2021-07-16

## 2021-07-15 RX ADMIN — TAMSULOSIN HYDROCHLORIDE 0.4 MG: 0.4 CAPSULE ORAL at 11:49

## 2021-07-15 RX ADMIN — TRAZODONE HYDROCHLORIDE 50 MG: 50 TABLET ORAL at 03:38

## 2021-07-15 RX ADMIN — TRAZODONE HYDROCHLORIDE 50 MG: 50 TABLET ORAL at 20:28

## 2021-07-15 RX ADMIN — HEPARIN SODIUM 8 UNITS/KG/HR: 10000 INJECTION, SOLUTION INTRAVENOUS at 22:32

## 2021-07-15 RX ADMIN — FUROSEMIDE 5 MG/HR: 10 INJECTION INTRAMUSCULAR; INTRAVENOUS at 16:36

## 2021-07-15 RX ADMIN — AMIODARONE HYDROCHLORIDE 150 MG: 50 INJECTION, SOLUTION INTRAVENOUS at 02:20

## 2021-07-15 RX ADMIN — DIGOXIN 250 MCG: 250 INJECTION, SOLUTION INTRAMUSCULAR; INTRAVENOUS; PARENTERAL at 17:45

## 2021-07-15 RX ADMIN — OXYCODONE HYDROCHLORIDE AND ACETAMINOPHEN 1 TABLET: 5; 325 TABLET ORAL at 11:49

## 2021-07-15 RX ADMIN — HEPARIN SODIUM 6 UNITS/KG/HR: 10000 INJECTION, SOLUTION INTRAVENOUS at 14:26

## 2021-07-15 RX ADMIN — OXYCODONE HYDROCHLORIDE AND ACETAMINOPHEN 2 TABLET: 5; 325 TABLET ORAL at 22:11

## 2021-07-15 RX ADMIN — ACETAMINOPHEN 650 MG: 325 TABLET, FILM COATED ORAL at 03:38

## 2021-07-15 RX ADMIN — HEPARIN SODIUM 5000 UNITS: 5000 INJECTION INTRAVENOUS; SUBCUTANEOUS at 05:51

## 2021-07-15 RX ADMIN — AMIODARONE HYDROCHLORIDE 0.5 MG/MIN: 50 INJECTION, SOLUTION INTRAVENOUS at 16:35

## 2021-07-15 RX ADMIN — HEPARIN SODIUM 4000 UNITS: 1000 INJECTION INTRAVENOUS; SUBCUTANEOUS at 14:25

## 2021-07-15 RX ADMIN — AMIODARONE HYDROCHLORIDE 1 MG/MIN: 50 INJECTION, SOLUTION INTRAVENOUS at 02:36

## 2021-07-15 RX ADMIN — FUROSEMIDE 5 MG/HR: 10 INJECTION INTRAMUSCULAR; INTRAVENOUS at 03:40

## 2021-07-15 RX ADMIN — HEPARIN SODIUM 2000 UNITS: 1000 INJECTION INTRAVENOUS; SUBCUTANEOUS at 22:32

## 2021-07-15 ASSESSMENT — ENCOUNTER SYMPTOMS
ALLERGIC/IMMUNOLOGIC NEGATIVE: 1
SHORTNESS OF BREATH: 1
FACIAL SWELLING: 0
NAUSEA: 0
COLOR CHANGE: 0
WHEEZING: 0
PHOTOPHOBIA: 0
VOMITING: 0
COUGH: 0
STRIDOR: 0
SORE THROAT: 0
ABDOMINAL DISTENTION: 0
BLOOD IN STOOL: 0
DIARRHEA: 0
CONSTIPATION: 0
ABDOMINAL PAIN: 0
BACK PAIN: 1

## 2021-07-15 ASSESSMENT — PAIN SCALES - GENERAL
PAINLEVEL_OUTOF10: 5
PAINLEVEL_OUTOF10: 5
PAINLEVEL_OUTOF10: 0
PAINLEVEL_OUTOF10: 7
PAINLEVEL_OUTOF10: 0
PAINLEVEL_OUTOF10: 0
PAINLEVEL_OUTOF10: 3
PAINLEVEL_OUTOF10: 0

## 2021-07-15 ASSESSMENT — PAIN DESCRIPTION - FREQUENCY
FREQUENCY: CONTINUOUS

## 2021-07-15 ASSESSMENT — PAIN DESCRIPTION - PROGRESSION
CLINICAL_PROGRESSION: GRADUALLY WORSENING
CLINICAL_PROGRESSION: NOT CHANGED
CLINICAL_PROGRESSION: GRADUALLY WORSENING
CLINICAL_PROGRESSION: GRADUALLY WORSENING

## 2021-07-15 ASSESSMENT — PAIN DESCRIPTION - DESCRIPTORS
DESCRIPTORS: ACHING
DESCRIPTORS: ACHING
DESCRIPTORS: ACHING;SORE

## 2021-07-15 ASSESSMENT — PAIN DESCRIPTION - LOCATION
LOCATION: BACK
LOCATION: BACK
LOCATION: KNEE

## 2021-07-15 ASSESSMENT — PAIN DESCRIPTION - ONSET
ONSET: ON-GOING
ONSET: ON-GOING

## 2021-07-15 ASSESSMENT — PAIN - FUNCTIONAL ASSESSMENT: PAIN_FUNCTIONAL_ASSESSMENT: PREVENTS OR INTERFERES SOME ACTIVE ACTIVITIES AND ADLS

## 2021-07-15 ASSESSMENT — PAIN DESCRIPTION - PAIN TYPE
TYPE: CHRONIC PAIN

## 2021-07-15 ASSESSMENT — PAIN DESCRIPTION - ORIENTATION: ORIENTATION: LOWER

## 2021-07-15 NOTE — PLAN OF CARE
Problem: Falls - Risk of:  Goal: Will remain free from falls  Description: Will remain free from falls  Outcome: Ongoing  Fall precautions in place. Calls out appropriately. No falls this shift. Problem: Pain:  Goal: Pain level will decrease  Description: Pain level will decrease  Outcome: Ongoing   Patient stated 3/10 pain do to chronic knee pain. Administered tylenol and patient was satisfied. Problem: Fluid Volume - Imbalance:  Goal: Absence of imbalanced fluid volume signs and symptoms  Description: Absence of imbalanced fluid volume signs and symptoms  Outcome: Ongoing  Patient placed on Laxis GTT for fluid overload. UO has been adequate. Problem: Cardiac Output - Decreased:  Goal: Hemodynamic stability will improve  Description: Hemodynamic stability will improve  Outcome: Ongoing  Patient admitted with Afib RVR. Originally started with Cardizem but pressures were too soft. Switched to [employer] and tolerating well.

## 2021-07-15 NOTE — ED NOTES
Report to Blank PRINCE for  [phone] and informed of mauricio conway.       Tabby Jose RN  07/14/21 7088

## 2021-07-15 NOTE — ED PROVIDER NOTES
[address] [facility]  eMERGENCY dEPARTMENT eNCOUnter      Pt Name: Vivi Leventhal  MRN: [id number]  [place] 1945  Date of evaluation: 7/14/2021  Provider: Renetta Arredondo MD  PCP: Sadia Velásquez MD      [address] [place] [address]       Chief Complaint   Patient presents with    Shortness of Breath     past week, with exertion, retaining fluid       HISTORY OFPRESENT ILLNESS   (Location/Symptom, Timing/Onset, Context/Setting, Quality, Duration, Modifying Factors,Severity)  Note limiting factors. Vivi Leventhal is a 67 y.o. male presents with complaints that he is filling up with fluid and he does not feel well he has been started on Lasix recently for congestive heart failure and has been taking it and it worked well for a while but for the last week or so he has been getting worse and in the last 3 to 4 days he has had some extreme dyspnea on exertion he denies any chest pain. Nursing Notes were all reviewed and agreed with or any disagreements were addressed  in the HPI. REVIEW OF SYSTEMS    (2-9 systems for level 4, 10 or more for level 5)     Review of Systems    Positives and Pertinent negatives as per HPI. Except as noted above in the ROS, all other systems were reviewed andnegative.        PASTMEDICAL HISTORY     Past Medical History:   Diagnosis Date    Diabetes mellitus ([address]     Hyperlipidemia     Hypertension     Thyroid disease          SURGICAL HISTORY       Past Surgical History:   Procedure Laterality Date    ANKLE SURGERY Left     JOINT REPLACEMENT Bilateral     knee replacements    KNEE SURGERY Right          CURRENT MEDICATIONS       Previous Medications    ATORVASTATIN CALCIUM PO    Take by mouth    FUROSEMIDE (LASIX) 40 MG TABLET    Take 40 mg by mouth 2 times daily    IBUPROFEN (ADVIL;MOTRIN) 800 MG TABLET    Take 1 tablet by mouth every 8 hours as needed for Pain    LEVOTHYROXINE SODIUM (SYNTHROID PO)    Take by mouth    LOSARTAN POTASSIUM PO    Take by mouth    MELOXICAM PO    Take by mouth    NONFORMULARY        OXYCODONE-ACETAMINOPHEN (PERCOCET PO)    Take by mouth    PIOGLITAZONE (ACTOS) 45 MG TABLET    TAKE 1 TABLET DAILY       ALLERGIES     Patient has no known allergies. FAMILY HISTORY       Family History   Problem Relation Age of Onset    Arthritis Other     Diabetes Other           SOCIAL HISTORY       Social History     Socioeconomic History    Marital status:      Spouse name: None    Number of children: None    Years of education: None    Highest education level: None   Occupational History    None   Tobacco Use    Smoking status: Never Smoker    Smokeless tobacco: Never Used   Vaping Use    Vaping Use: Never used   Substance and Sexual Activity    Alcohol use: No    Drug use: None    Sexual activity: Not Currently   Other Topics Concern    None   Social History Narrative    None     Social Determinants of Health     Financial Resource Strain:     Difficulty of Paying Living Expenses:    Food Insecurity:     Worried About Running Out of Food in the Last Year:     Ran Out of Food in the Last Year:    Transportation Needs:     Lack of Transportation (Medical):      Lack of Transportation (Non-Medical):    Physical Activity:     Days of Exercise per Week:     Minutes of Exercise per Session:    Stress:     Feeling of Stress :    Social Connections:     Frequency of Communication with Friends and Family:     Frequency of Social Gatherings with Friends and Family:     Attends Muslim Services:     Active Member of Clubs or Organizations:     Attends Club or Organization Meetings:     Marital Status:    Intimate Partner Violence:     Fear of Current or Ex-Partner:     Emotionally Abused:     Physically Abused:     Sexually Abused:        SCREENINGS      @FLOW([id number])@      PHYSICAL EXAM    (up to 7 for level 4, 8 or more for level 5)     ED Triage Vitals [07/14/21 1921]   BP Temp Temp Source Pulse Resp SpO2 Height Weight   (!) 155/76 98.1 °F (36.7 °C) Oral 147 24 96 % -- (!) 352 lb 9.6 oz (159.9 kg)       Physical Exam      General Appearance:  Alert, cooperative, no distress, appears stated age. Head:  Normocephalic, without obviousabnormality, atraumatic. Eyes:  conjunctiva/corneas clear, EOM's intact. Sclera anicteric. ENT: Mucous membranes moist.   Neck: Supple, symmetrical, trachea midline, no adenopathy. No jugular venous distention. Lungs:   Clear to auscultation bilaterally, respirationsunlabored. No rales, rhonchi or wheezes. Chest Wall:  No tenderness. Heart:  Regular rate and rhythm, S1 and S2 normal, no murmur, rub or gallop. Abdomen:   Soft, non-tender, bowel sounds active,   no masses, no organomegaly. Extremities: No edema, cords or calf tenderness. Full range of motion. Pulses: 2+ and symmetric   Skin: Turgor is normal, no rashes or lesions. Neurologic: Alert and oriented X 3. No focal findings.   Motor grossly normal.  Speech clear, no drift, CN III-XII grossly intact,        DIAGNOSTIC RESULTS   LABS:    Labs Reviewed   CBC WITH AUTO DIFFERENTIAL - Abnormal; Notable for the following components:       Result Value    RBC 3.35 (*)     Hemoglobin 11.6 (*)     Hematocrit 34.4 (*)     .9 (*)     MCH 34.6 (*)     All other components within normal limits    Narrative:     Performed at:  [facility]  [address],  [place]   Phone [phone]   COMPREHENSIVE METABOLIC PANEL W/ REFLEX TO MG FOR LOW K - Abnormal; Notable for the following components:    Sodium 135 (*)     Potassium reflex Magnesium 5.5 (*)     Glucose 146 (*)     BUN 54 (*)     CREATININE 1.6 (*)     GFR Non- 42 (*)     GFR  51 (*)     All other components within normal limits    Narrative:     Performed at:  [facility]  [address],  [place]   Phone [phone] TROPONIN - Abnormal; Notable for the following components:    Troponin 0.04 (*)     All other components within normal limits    Narrative:     Performed at:  [facility]  [address]   Phone [phone] - Abnormal; Notable for the following components:    Pro-BNP 3,018 (*)     All other components within normal limits    Narrative:     Performed at:  [facility]  [address]   Phone [phone]   URINE RT REFLEX TO CULTURE       All other labs were within normal range or not returned as of this dictation. EKG: All EKG's are interpreted by the Emergency Department Physician who eithersigns or Co-signs this chart in the absence of a cardiologist.    The Ekg interpreted by me in the absence of a cardiologist shows. A fib  Rate of   147  Axis is   Normal  QTc is  within an acceptable range  Intervals and Durations are unremarkable. Nonspecific ST-T wave changes appreciated. No evidence of acute ischemia. RADIOLOGY:   Non-plain film images such as CT, Ultrasound and MRI are read by the radiologist. Plain radiographic images are visualized by myself. *    Interpretation per the Radiologist below, if available at the time of this note:    XR CHEST PORTABLE   Final Result      No acute disease.                         PROCEDURES   Unless otherwise noted below, none     Procedures    *    CRITICAL CARE TIME   N/A      EMERGENCY DEPARTMENT COURSE and DIFFERENTIALDIAGNOSIS/MDM:   Vitals:    Vitals:    07/14/21 1921 07/14/21 1928 07/14/21 2030   BP: (!) 155/76  (!) 102/59   Pulse: 147  93   Resp: 24  16   Temp: 98.1 °F (36.7 °C)     TempSrc: Oral     SpO2: 96% 99% 100%   Weight: (!) 352 lb 9.6 oz (159.9 kg)         Patient was given thefollowing medications:  Medications   dilTIAZem 100 mg in dextrose 5 % 100 mL infusion (ADD-[place]) (has no administration in time range)   dilTIAZem injection 20 mg (20 mg Intravenous Given 7/14/21 2017)           The patient tolerated their visit well. The patient and / or the familywere informed of the results of any tests, a time was given to answer questions. FINAL IMPRESSION      1. Persistent atrial fibrillation (HCC)    2. Elevated troponin    3. Congestive heart failure, unspecified HF chronicity, unspecified heart failure type [facility])          DISPOSITION/PLAN   DISPOSITION Admitted 07/14/2021 08:51:09 PM    IV was established patient was given a bolus of 20 of Cardizem which brought his heart rate down into the 90s also brought his blood pressure to 102 so I am withholding the drip at this time if his blood pressure comes up spontaneously we will start the drip I discussed the case with the hospitalist and the patient will be admitted with a diagnosis of new onset atrial fibrillation L elevated troponin and congestive heart failure  PATIENT REFERRED TO:  No follow-up provider specified.     DISCHARGE MEDICATIONS:  New Prescriptions    No medications on file       DISCONTINUED MEDICATIONS:  Discontinued Medications    No medications on file              (Please note that portions of this note were completed with a voice recognition program.  Efforts were made to edit the dictations but occasionally words are mis-transcribed.)    Negar Michael MD (electronically signed)     Negar Michael MD  07/14/21 2041       Negar Michael MD  07/14/21 2105

## 2021-07-15 NOTE — CONSULTS
Nephrology Consult Note                                                                                                                                                                                                                                                                                                                                                               Office : [phone]     Fax :[phone]              Patient's Name: Ramiro Newman    Reason for Consult: ELLEN   Requesting Physician:  Jhui Benson MD      Chief Complaint:  SOB    History of Present Ilness:    Ramiro Newman is a 67 y.o. patient who presented to the hospital with complaints of sob. The patient has sob. He was found to have AF with rvr and became hypotensive with cardizem. No chest pain. And does not know specifically when he developed AF. Cr worse from baseline        Past Medical History:   Diagnosis Date    Diabetes mellitus ([facility] [address]     Hyperlipidemia     Hypertension     Thyroid disease        Past Surgical History:   Procedure Laterality Date    ANKLE SURGERY Left     JOINT REPLACEMENT Bilateral     knee replacements    KNEE SURGERY Right        Family History   Problem Relation Age of Onset    Arthritis Other     Diabetes Other         reports that he has never smoked. He has never used smokeless tobacco. He reports that he does not drink alcohol. Allergies:  Patient has no known allergies.     Current Medications:    amiodarone (CORDARONE) 450 mg in dextrose 5 % 250 mL infusion, Continuous  levothyroxine (SYNTHROID) tablet 75 mcg, Daily  sodium chloride flush 0.9 % injection 5-40 mL, 2 times per day  sodium chloride flush 0.9 % injection 5-40 mL, PRN  0.9 % sodium chloride infusion, PRN  ondansetron (ZOFRAN-ODT) disintegrating tablet 4 mg, Q8H PRN   Or  ondansetron (ZOFRAN) injection 4 mg, Q6H PRN  polyethylene glycol (GLYCOLAX) packet 17 g, Daily PRN  acetaminophen (TYLENOL) tablet 650 mg, Q6H PRN Or  acetaminophen (TYLENOL) suppository 650 mg, Q6H PRN  perflutren lipid microspheres (DEFINITY) injection 1.65 mg, ONCE PRN  furosemide (LASIX) 100 mg in dextrose 5 % 100 mL infusion, Continuous  traZODone (DESYREL) tablet 50 mg, Nightly  insulin lispro (1 Unit Dial) 0-6 Units, TID WC  insulin lispro (1 Unit Dial) 0-3 Units, Nightly  glucose (GLUTOSE) 40 % oral gel 15 g, PRN  dextrose 50 % IV solution, PRN  glucagon (rDNA) injection 1 mg, PRN  dextrose 5 % solution, PRN  oxyCODONE-acetaminophen (PERCOCET) 5-325 MG per tablet 1 tablet, Q4H PRN   Or  oxyCODONE-acetaminophen (PERCOCET) 5-325 MG per tablet 2 tablet, Q4H PRN  tamsulosin (FLOMAX) capsule 0.4 mg, Daily  pantoprazole (PROTONIX) tablet 40 mg, QAM AC  heparin 25,000 units in dextrose 5% 250 mL (premix) infusion, Continuous  heparin (porcine) injection 4,000 Units, PRN  heparin (porcine) injection 2,000 Units, PRN        Review of Systems:   14 point ROS obtained but were negative except mentioned in HPI      Physical exam:     Vitals:  /81   Pulse 102   Temp 98.2 °F (36.8 °C) (Oral)   Resp 17   Ht 5' 10\" (1.778 m)   Wt (!) 348 lb 15.8 oz (158.3 kg)   SpO2 98%   BMI 50.07 kg/m²   Constitutional:  OAA X3 NAD  Skin: no rash, turgor wnl  Heent:  eomi, mmm  Neck: no bruits or jvd noted  Cardiovascular:  S1, S2 without m/r/g  Respiratory: CTA B without w/r/r  Abdomen:  +bs, soft, nt, nd  Ext: + lower extremity edema  Psychiatric: mood and affect appropriate  Musculoskeletal:  Rom, muscular strength intact    Data:   Labs:  CBC:   Recent Labs     07/14/21  1942 07/15/21  1355   WBC 4.0 3.8*   HGB 11.6* 12.4*    146     BMP:    Recent Labs     07/14/21  1942 07/15/21  0249   * 136   K 5.5* 4.2    100   CO2 25 26   BUN 54* 49*   CREATININE 1.6* 1.8*   GLUCOSE 146* 140*     Ca/Mg/Phos:   Recent Labs     07/14/21  1942 07/15/21  0249 07/15/21  0447   CALCIUM 9.1 8.7  --    MG  --   --  2.20     Hepatic:   Recent Labs     07/14/21 1942

## 2021-07-15 NOTE — PROGRESS NOTES
Progress Note    Admit Date: 7/14/2021  Day: 1  Diet: ADULT DIET; Regular; Low Sodium (2 gm); 1500 ml    CC: Shortness of breath x1 day    Interval history: No acute overnight events. Patient seen and examined in AM. AOx3, patient sitting up in a chair, in no acute distress. Patient says he has improved SOB and that he \"feels much better today\". He wonders when he can go home. He says he feels some mild burning on urination, and that his stream has decreased since admission. Denies color changes, blood in urine. He is afebrile and HDS. Medications:     Scheduled Meds:   [START ON 7/16/2021] levothyroxine  75 mcg Oral Daily    sodium chloride flush  5-40 mL Intravenous 2 times per day    heparin (porcine)  5,000 Units Subcutaneous 3 times per day    traZODone  50 mg Oral Nightly    insulin lispro  0-6 Units Subcutaneous TID WC    insulin lispro  0-3 Units Subcutaneous Nightly    tamsulosin  0.4 mg Oral Daily     Continuous Infusions:   amiodarone 0.5 mg/min (07/15/21 6144)    sodium chloride      furosemide (LASIX) 1mg/ml infusion 5 mg/hr (07/15/21 0340)    dextrose       PRN Meds:sodium chloride flush, sodium chloride, ondansetron **OR** ondansetron, polyethylene glycol, acetaminophen **OR** acetaminophen, perflutren lipid microspheres, glucose, dextrose, glucagon (rDNA), dextrose, oxyCODONE-acetaminophen **OR** oxyCODONE-acetaminophen    Objective:   Vitals:   T-max:  Patient Vitals for the past 8 hrs:   BP Temp Temp src Pulse Resp   07/15/21 0758 123/75 97.8 °F (36.6 °C) Oral 119 18   07/15/21 0400 125/77 98.2 °F (36.8 °C) Oral 116 18       Intake/Output Summary (Last 24 hours) at 7/15/2021 1012  Last data filed at 7/15/2021 1001  Gross per 24 hour   Intake    Output 3750 ml   Net -3750 ml     Review of Systems   Constitutional: Positive for unexpected weight change (9 lbs weight gain in 3 days). Negative for chills, diaphoresis, fatigue and fever.    HENT: Negative for facial swelling, hearing loss and sore throat. Eyes: Negative for photophobia and visual disturbance. Respiratory: Positive for shortness of breath (Improving from yesterday). Negative for cough, wheezing and stridor. Cardiovascular: Positive for leg swelling. Negative for chest pain and palpitations. Gastrointestinal: Negative for abdominal pain, blood in stool, constipation, diarrhea, nausea and vomiting. Genitourinary: Positive for decreased urine volume and difficulty urinating (Mild burning on urination, less force with urination). Negative for hematuria. Musculoskeletal: Negative. Skin: Negative. Allergic/Immunologic: Negative. Neurological: Positive for dizziness (Few seconds of dizziness on standing from a seated or prone position. ). Negative for tremors, weakness, light-headedness, numbness and headaches. Hematological: Negative. Psychiatric/Behavioral: Negative. Physical Exam  Constitutional:       Appearance: Normal appearance. HENT:      Head: Normocephalic and atraumatic. Mouth/Throat:      Mouth: Mucous membranes are moist.   Eyes:      Extraocular Movements: Extraocular movements intact. Pupils: Pupils are equal, round, and reactive to light. Cardiovascular:      Rate and Rhythm: Regular rhythm. Tachycardia present. Heart sounds: No murmur heard. No friction rub. No gallop. Comments: Soft heart sounds, difficult to auscultate  Dorsalis pedis pulse 1+ BL  Radial pulse 2+ BL  Pulmonary:      Effort: Pulmonary effort is normal.      Breath sounds: Normal breath sounds. No stridor. No wheezing, rhonchi or rales. Chest:      Chest wall: No tenderness. Abdominal:      Palpations: Abdomen is soft. There is no mass. Tenderness: There is no abdominal tenderness. There is no guarding. Musculoskeletal:      Cervical back: Normal range of motion and neck supple. Right lower leg: Edema present. Left lower leg: Edema present.       Comments: BL 2+ pitting edema to knees   Skin:     General: Skin is warm and dry. Capillary Refill: Capillary refill takes less than 2 seconds. Neurological:      Mental Status: He is oriented to person, place, and time. Psychiatric:         Mood and Affect: Mood normal.         Behavior: Behavior normal.           LABS:    CBC:   Recent Labs     07/14/21 1942   WBC 4.0   HGB 11.6*   HCT 34.4*      .9*     Renal:    Recent Labs     07/14/21  1942 07/15/21  0249 07/15/21  0447   * 136  --    K 5.5* 4.2  --     100  --    CO2 25 26  --    BUN 54* 49*  --    CREATININE 1.6* 1.8*  --    GLUCOSE 146* 140*  --    CALCIUM 9.1 8.7  --    MG  --   --  2.20   ANIONGAP 10 10  --      Hepatic:   Recent Labs     07/14/21 1942   AST 29   ALT 11   BILITOT 0.5   PROT 6.8   LABALBU 4.0   ALKPHOS 61     Troponin:   Recent Labs     07/14/21  1942 07/15/21  0126 07/15/21  0249   TROPONINI 0.04* 0.02* 0.03*     BNP: No results for input(s): BNP in the last 72 hours. Lipids: No results for input(s): CHOL, HDL in the last 72 hours. Invalid input(s): LDLCALCU, TRIGLYCERIDE  ABGs:  No results for input(s): PHART, FFR2OAP, PO2ART, SRD0CYV, BEART, THGBART, A1QYJQHX, VDF7GAK in the last 72 hours. INR: No results for input(s): INR in the last 72 hours. Lactate: No results for input(s): LACTATE in the last 72 hours. Cultures:  -----------------------------------------------------------------  RAD:   XR CHEST PORTABLE   Final Result      No acute disease. Assessment/Plan:   Rasheed Knutson is a 68 yo M with a PMHx of T2DM, HTN, BPH, and obesity who presented to the ED with one day hx of increasing exertional SOB. Acute decompensated heart failure  Increasing exertional dyspnea  Obesity (BMI >50) with low amounts of ADLs. NYHA class III.   BL lower extremity edema to knee  9 lb weight gain in 3 days   Patient is going to see a cardiologist for the first time next week  - Lasix 40 bid  - Echo to evaluate heart function pending    Afib with RVR  EKG 7/15/2021 showed noew-onset Atrial fibrillation with rapid ventricular response, low voltage QRS, non-specific ST and T wave abnormalities  - Continue amiodarone  - Heparin (CHADSVASc score >3). - On tele    ELLEN 2/2 hypoperfusion d/t CHF  BUN/Cr ratio on admission >20  OA BUN 54, Cr 1.6, now BUN 49, Cr 1.8.  - Trend creatinine  - Consult nephrology    Dizziness on standing most likely orthostatic hypotension  The dizziness only occurs for a couple of seconds when he first stands. He says it has been occurring since his shortness of breath.  - Orthostats  - Monitor BP    Possible UTI  Patient admits to new-onset mild burning on urination  Previous U/A 7/14/2021 showed no signs of infection  WBC normal on 7/14/21  Labs pending  - Repeat U/A    BPH  Patient says he's having a harder time urinating while admitted.   - Continue home tamsulosin  - Follow OP    T2DM  - Stop home medications  - Low dose sliding scale insulin  - HbA1c  - Continue to monitor with POC glucose  - Follow OP    Macrocytic anemia  .9  - Folate and B12 levels    Likely YOHANA  BMI >50, increased neck girth, and decreased exertional activity  - Order sleep study OP  - Monitor overnight for changes in tele or SpO2  - CPAP if needed while in-hospital    Code Status:  FEN:   PPX:   DISPO:     Dave Xavier, MS-4  07/15/21  10:12 AM    This patient has been staffed and discussed with Henny Gordillo MD.

## 2021-07-15 NOTE — PLAN OF CARE
Problem: Falls - Risk of:  Goal: Will remain free from falls  Description: Will remain free from falls  7/15/2021 1704 by Fredy Mosquera RN  Outcome: Ongoing  Note: Pt is alert and oriented x 4. Bed alarm on, call light within reach. Pt calls for assistance     Problem: Pain:  Goal: Control of chronic pain  Description: Control of chronic pain  Outcome: Ongoing  Note: Pt c/o chronic pain to back and legs. Administered 1 percocet and pt resting comfortably after administration. Problem: Fluid Volume - Imbalance:  Goal: Absence of imbalanced fluid volume signs and symptoms  Description: Absence of imbalanced fluid volume signs and symptoms  7/15/2021 1704 by Fredy Mosquera RN  Outcome: Ongoing  Note: +2 pitting edema to BLE. Lasix gtt @ 5 mg/hr. Urinating well per urinal.     Problem: HEMODYNAMIC STATUS  Goal: Patient has stable vital signs and fluid balance  Outcome: Ongoing  Note: BP stable, -130s at rest, up to 150s with exertion. Amio gtt infusing. Received order for digoxin IV x 1 per Dr. Gerry Hankins.

## 2021-07-15 NOTE — CARE COORDINATION
Case Management Assessment           Initial Evaluation                Date / Time of Evaluation: 7/15/2021 4:00 PM                 Assessment Completed by: Giovanni Fonatnez RN    Patient Name: Adelso Abraham     YOB: 1945  Diagnosis: Atrial fibrillation with RVR [facility]) [I48.91]     Date / Time: 7/14/2021  7:22 PM    Patient Admission Status: Inpatient    If patient is discharged prior to next notation, then this note serves as note for discharge by case management. Current PCP: James Villarreal MD  Clinic Patient: No    Chart Reviewed: Yes  Patient/ Family Interviewed: Yes    Initial assessment completed at bedside with: patient     Hospitalization in the last 30 days: No    Emergency Contacts:  Extended Emergency Contact Information  Primary Emergency Contact: Perry,Claude E  Address: [address] Phone: [phone]  Relation: Child    Advance Directives:   Code Status: [address]: No    Financial  Payor: Yamila Manrique / Plan: [facility] PPO / Product Type: Medicare /     Pre-cert required for SNF: No    Pharmacy    [facility] [address], [address] [place] [address]  Phone: [phone] Fax: [phone]      Potential assistance Purchasing Medications: Potential Assistance Purchasing Medications: No  Does Patient want to participate in local refill/ meds to beds program?: Yes    Meds To Beds General Rules:  1. Can ONLY be done Monday- Friday between 8:30am-5pm  2. Prescription(s) must be in pharmacy by 3pm to be filled same day  3. Copy of patient's insurance/ prescription drug card and patient face sheet must be sent along with the prescription(s)  4. Cost of Rx cannot be added to hospital bill. If financial assistance is needed, please contact unit  or ;   or provided with basic dialogue that supports the patient's individualized plan of care/goals and shares the quality data associated with the providers.  Yes    Care Transition patient: No    Veronica Linton RN  [facility]  Case Management Department  Ph: [phone]   Fax: [phone]

## 2021-07-15 NOTE — CONSULTS
[address]         Reason for Consultation/Chief Complaint: \"I have been having sob. \"       History of Present Illness:  Lita Espinosa is a 67 y.o. patient who presented to the hospital with complaints of sob. The patient has sob. He was found to have AF with rvr and became hypotensive with cardizem. No chest pain. And does not know specifically when he developed AF. Past Medical History:   has a past medical history of Diabetes mellitus ([address] Hyperlipidemia, Hypertension, and Thyroid disease. Surgical History:   has a past surgical history that includes Ankle surgery (Left); knee surgery (Right); and joint replacement (Bilateral). Social History:   reports that he has never smoked. He has never used smokeless tobacco. He reports that he does not drink alcohol. Family History:  No evidence for sudden cardiac death or premature CAD    Home Medications:  Were reviewed and are listed in nursing record. and/or listed below  Prior to Admission medications    Medication Sig Start Date End Date Taking?  Authorizing Provider   furosemide (LASIX) 40 MG tablet Take 40 mg by mouth 2 times daily   Yes Historical Provider, MD   pioglitazone (ACTOS) 45 MG tablet TAKE 1 TABLET DAILY 1/14/20  Yes Historical Provider, MD   oxyCODONE-Acetaminophen (PERCOCET PO) Take by mouth   Yes Historical Provider, MD   LOSARTAN POTASSIUM PO Take by mouth   Yes Historical Provider, MD   ATORVASTATIN CALCIUM PO Take by mouth   Yes Historical Provider, MD   Levothyroxine Sodium (SYNTHROID PO) Take by mouth   Yes Historical Provider, MD   MELOXICAM PO Take by mouth   Yes Historical Provider, MD   NONFORMULARY     Historical Provider, MD   ibuprofen (ADVIL;MOTRIN) 800 MG tablet Take 1 tablet by mouth every 8 hours as needed for Pain 2/9/17   Margaux Shin MD        Hospital Medications:  Meg Zavala ON 7/16/2021] levothyroxine  75 mcg Oral Daily    sodium chloride flush  5-40 mL Intravenous 2 times per day    traZODone  50 mg Oral Nightly    insulin lispro  0-6 Units Subcutaneous TID     insulin lispro  0-3 Units Subcutaneous Nightly    tamsulosin  0.4 mg Oral Daily    apixaban  2.5 mg Oral BID    [START ON 7/16/2021] pantoprazole  40 mg Oral QAM AC     sodium chloride flush, sodium chloride, ondansetron **OR** ondansetron, polyethylene glycol, acetaminophen **OR** acetaminophen, perflutren lipid microspheres, glucose, dextrose, glucagon (rDNA), dextrose, oxyCODONE-acetaminophen **OR** oxyCODONE-acetaminophen   amiodarone 0.5 mg/min (07/15/21 9850)    sodium chloride      furosemide (LASIX) 1mg/ml infusion 5 mg/hr (07/15/21 3170)    dextrose         Allergies:  Patient has no known allergies. Review of Systems:     A 14 ROS obtained and negative except as mentioned in HPI. · Constitutional: there has been no unanticipated weight loss. · Eyes: No visual changes or diplopia. No scleral icterus. · ENT: No Headaches, hearing loss or vertigo. No mouth sores or sore throat. · Cardiovascular: No loss of consciousness. No hemoptysis, pleuritic pain, or phlebitis. · Respiratory: No cough or wheezing, no sputum production. No hematemesis. · Gastrointestinal: No abdominal pain, appetite loss, blood in stools. No change in bowel or bladder habits. Distension. · Genitourinary: No dysuria, or hematuria. · Musculoskeletal: + gait disturbance, ++weakness or joint complaints. · Integumentary: No rash or pruritis. edema  · Neurological: No headache, diplopia,numbness or tingling. No change in gait, balance, coordination, mood, affect, memory, mentation, behavior.   · Psychiatric: No anxiety,  · Endocrine: No malaise,  · Hematologic/Lymphatic: No abnormal bruising  · Allergic/Immunologic: No nasal congestion      Physical Examination:    Vitals:    07/15/21 1144   BP: 104/72   Pulse: 131   Resp: 18   Temp: 97 °F (36.1 °C)   SpO2: 98%    Weight: (!) 348 lb 15.8 oz (158.3 kg)         General Appearance:  Alert, cooperative, no distress, appears stated age   Head:  Normocephalic, without obvious abnormality, atraumatic   Eyes:  PERRL   Nose: Nares normal,   Neck: Supple, JVP elevated   Lungs:   Clear to auscultation bilaterally, respirations unlabored   Chest Wall:  No tenderness or deformity   Heart:  Irregularly irregular  normal S1, S2 normal,  II/VI HSM No rub. No S3 gallop   Abdomen:   Soft, non-tender, +bowel sounds   Extremities: no cyanosis, no clubbing , 2+, diffuse  edema   Pulses: Symmetric  extremities   Skin: no gross lesions or rashes   Pysch: Normal mood and affect   Neurologic: No gross deficits. CN II - XII grossly intact        Labs  CBC:   Lab Results   Component Value Date    WBC 4.0 07/14/2021    RBC 3.35 07/14/2021    HGB 11.6 07/14/2021    HCT 34.4 07/14/2021    .9 07/14/2021    RDW 13.2 07/14/2021     07/14/2021     CMP:    Lab Results   Component Value Date     07/15/2021    K 4.2 07/15/2021     07/15/2021    CO2 26 07/15/2021    BUN 49 07/15/2021    CREATININE 1.8 07/15/2021    GFRAA 45 07/15/2021    AGRATIO 1.4 07/14/2021    LABGLOM 37 07/15/2021    GLUCOSE 140 07/15/2021    PROT 6.8 07/14/2021    CALCIUM 8.7 07/15/2021    BILITOT 0.5 07/14/2021    ALKPHOS 61 07/14/2021    AST 29 07/14/2021    ALT 11 07/14/2021     PT/INR:  No results found for: PTINR  Recent Labs     07/15/21  0126 07/15/21  0249 07/15/21  0959   TROPONINI 0.02* 0.03* 0.03*       EKG: Atrial fibrillation with RVR with NSST/T wave changes. Assessment  Patient Active Problem List   Diagnosis    Atrial fibrillation with RVR (HCC)        Impression:  AF with RVR. Acute on chronic diastolic HF. Troponin elevation due to demand ischemia. Hypotension with ditiazem now receiving amio gtt for rate control. CRI    Recommendations:    I had the opportunity to review the clinical symptoms and presentation of Sheila Hodgkins.      I recommend that the patient undergo further cardiac evaluation with  Echo.  amio gtt to control RVR. Lasix gtt to continue. Follow Michael. Thank you for allowing to us to participate in the care or Rossy Melgoza. All questions and concerns were addressed to the patient/family. Alternatives to my treatment were discussed. The note was completed using EMR. Every effort was made to ensure accuracy; however, inadvertent computerized transcription errors may be present.        Yudelka Velazquez MD [facility] [place]

## 2021-07-15 NOTE — PROGRESS NOTES
Progress Note    Admit Date: 7/14/2021  Day: 1  Diet: ADULT DIET; Regular; Low Sodium (2 gm); 1500 ml    CC: Shortness of breath    Interval history:  No acute events overnight. Patient seen and examined at [facility]. Patient reports SOB improved. Patient denies shortness of breath, chest pain, chills, nausea, vomiting. He denies urinary frequency, dysuria. He mentioned that his urinary stream has decreased in intensity. Patient is hemodynamically stable and afebrile He remains on heparin sc      Medications:     Scheduled Meds:   [START ON 7/16/2021] levothyroxine  75 mcg Oral Daily    sodium chloride flush  5-40 mL Intravenous 2 times per day    heparin (porcine)  5,000 Units Subcutaneous 3 times per day    traZODone  50 mg Oral Nightly    insulin lispro  0-6 Units Subcutaneous TID WC    insulin lispro  0-3 Units Subcutaneous Nightly    tamsulosin  0.4 mg Oral Daily     Continuous Infusions:   amiodarone 0.5 mg/min (07/15/21 2926)    sodium chloride      furosemide (LASIX) 1mg/ml infusion 5 mg/hr (07/15/21 0340)    dextrose       PRN Meds:sodium chloride flush, sodium chloride, ondansetron **OR** ondansetron, polyethylene glycol, acetaminophen **OR** acetaminophen, perflutren lipid microspheres, glucose, dextrose, glucagon (rDNA), dextrose, oxyCODONE-acetaminophen **OR** oxyCODONE-acetaminophen    Objective:   Vitals:   T-max:  Patient Vitals for the past 8 hrs:   BP Temp Temp src Pulse Resp   07/15/21 0758 123/75 97.8 °F (36.6 °C) Oral 119 18   07/15/21 0400 125/77 98.2 °F (36.8 °C) Oral 116 18       Intake/Output Summary (Last 24 hours) at 7/15/2021 1026  Last data filed at 7/15/2021 1001  Gross per 24 hour   Intake --   Output 3750 ml   Net -3750 ml       ROS: A 10 point review of systems was conducted, significant findings as noted in HPI. Physical Exam  Constitutional:       Appearance: Normal appearance. He is obese. HENT:      Head: Normocephalic.       Mouth/Throat:      Mouth: Mucous membranes are moist.   Eyes:      Extraocular Movements: Extraocular movements intact. Conjunctiva/sclera: Conjunctivae normal.      Pupils: Pupils are equal, round, and reactive to light. Neck:      Comments: JVC elevated  Cardiovascular:      Rate and Rhythm: Regular rhythm. Tachycardia present. Pulses: Normal pulses. Heart sounds: Normal heart sounds. Pulmonary:      Effort: Pulmonary effort is normal.      Breath sounds: Rales present. Comments: Bilateral base of the lungs    Abdominal:      General: Abdomen is flat. Bowel sounds are normal.      Palpations: Abdomen is soft. Musculoskeletal:         General: Normal range of motion. Cervical back: Normal range of motion. Right lower le+ Edema present. Left lower le+ Edema present. Neurological:      General: No focal deficit present. Mental Status: He is alert and oriented to person, place, and time. Mental status is at baseline. Psychiatric:         Mood and Affect: Mood normal.         Behavior: Behavior normal.         LABS:    CBC:   Recent Labs     21   WBC 4.0   HGB 11.6*   HCT 34.4*      .9*     Renal:    Recent Labs     07/14/21  1942 07/15/21  0249 07/15/21  0447   * 136  --    K 5.5* 4.2  --     100  --    CO2 25 26  --    BUN 54* 49*  --    CREATININE 1.6* 1.8*  --    GLUCOSE 146* 140*  --    CALCIUM 9.1 8.7  --    MG  --   --  2.20   ANIONGAP 10 10  --      Hepatic:   Recent Labs     21   AST 29   ALT 11   BILITOT 0.5   PROT 6.8   LABALBU 4.0   ALKPHOS 61     Troponin:   Recent Labs     07/14/21  1942 07/15/21  0126 07/15/21  0249   TROPONINI 0.04* 0.02* 0.03*     BNP: No results for input(s): BNP in the last 72 hours. Lipids: No results for input(s): CHOL, HDL in the last 72 hours.     Invalid input(s): LDLCALCU, TRIGLYCERIDE  ABGs:  No results for input(s): PHART, JVE7AEV, PO2ART, TLV2TCV, BEART, THGBART, U1OFARUD, ERZ8FST in the last 72 hours.    INR: No results for input(s): INR in the last 72 hours. Lactate: No results for input(s): LACTATE in the last 72 hours. Cultures:  -----------------------------------------------------------------  RAD:   XR CHEST PORTABLE   Final Result      No acute disease. Assessment/Plan:   Laisha Joyner is a 67 y.o. male with a PMH of HTN, T2DM, BPH, and obesity. He  presents to the ED with SOB for 1  day. He was admitted for work-up and management of  Acute heart failure decompensation. He is currently hemodynamically stable and afebrile. Acute decompensated heart failure   Shortness of breath, bilateral CECI edema, JVC elevated. Weight 310 lb (4/21) 348 now. NYHA 4 at admission now 3. BNP 3018. Normal WBC. Normal UA.   -F/u TSH  -F/u daily weightm, IsOs,  -F/u Echo   -Low fluid, low sodium diet  -Continue Lasix 40 mg IV BID  -F/u cardiology    Atrial fibrillation with RVR   on admission, 120-10s now. Received diltiazem, became hypotensive. Diltiazem discontinued, started on amiodarone. CHADSVASc: 4.  -Continue amiodarone  -F/u cardiology recommendations regarding anticoagulation / will need to weight benefits and risk of anticoagulation, taking into account patient is now on acute decompensated heart failure. May need Holter study to define. ELLEN 2/2 to decompensated HF  BUN/Cr ratio >20. Cr 1.6 at admission now 1.8  -Trend creatinine     T2DM  A1C 6 (2/21)  -follow blood glucose  -low dose sliding scale     Macrocytic anemia   Hgb 11.6, .9  -F/u folate and B12     YOHANA   BMI 50  -Outpatient sleep study    BPH  PSA 4.13 (4/21).  Patient reports his urinary stream has decreased in intensity.   -Start FLOMAX    Code Status: Full Code  FEN: Regular Diet  PPX: Heparin sc  DISPO: [facility]    Nicole Armendariz MD, PGY-1  07/15/21  10:26 AM    This patient has been staffed and discussed with Olga Lynn MD.

## 2021-07-15 NOTE — H&P
Internal Medicine  PGY 1  History & Physical      CC shortness of breath    History Obtained From:  patient    HISTORY OF PRESENT ILLNESS:   Mihir Buenrostro is a 68 y/o male with a PMH of HTN, DMt2, DDD, BPH, and obesity who presents with one day hx of SOB. The patient reports that starting today the patient noticed increased SOB. At baseline the patient has SOB with some activities such as going up a flight of stairs. Today, the patient found it more difficult to walk and go up his one flight of stairs. The patient also reports a 9 lb weight gain over the past 3 days. The patient has been taking Lasix 40 mg PRN prescribed by his primary care provider. A couple weeks ago he would take one pill a week, then one pill every other day, and over the past week he has been taking one pill a day. He has also noticed unilateral swelling in his LLE which started today. He reports that he has intermittent swelling at baseline. He also noticed his heart beating faster today. He has not had this feeling before. He reports no orthopnea and sleeps on his back with no increased amount of pillows. He denies fevers, chills, and chest pain. He reports some decreased urine output and increased frequency.       Past Medical History:        Diagnosis Date    Diabetes mellitus ([facility] [address]     Hyperlipidemia     Hypertension     Thyroid disease    ·     Past Surgical History:        Procedure Laterality Date    ANKLE SURGERY Left     JOINT REPLACEMENT Bilateral     knee replacements    KNEE SURGERY Right    ·     Medications Priorto Admission:    · Medications Prior to Admission: furosemide (LASIX) 40 MG tablet, Take 40 mg by mouth 2 times daily  · oxyCODONE-Acetaminophen (PERCOCET PO), Take by mouth  · pioglitazone (ACTOS) 45 MG tablet, TAKE 1 TABLET DAILY  · LOSARTAN POTASSIUM PO, Take by mouth  · ATORVASTATIN CALCIUM PO, Take by mouth  · NONFORMULARY,   · Levothyroxine Sodium (SYNTHROID PO), Take by mouth  · MELOXICAM PO, Take by mouth  · ibuprofen (ADVIL;MOTRIN) 800 MG tablet, Take 1 tablet by mouth every 8 hours as needed for Pain    Allergies:  Patient has no known allergies. Social History:   · TOBACCO:   reports that he has never smoked. He has never used smokeless tobacco.  · ETOH:   reports no history of alcohol use. · DRUGS : denies drug use  · Patient currently lives with family wife, two story house  ·   Family History:       Problem Relation Age of Onset    Arthritis Other     Diabetes Other    ·     Review of Systems   Constitutional: Positive for activity change. Negative for appetite change, chills, fatigue and fever. Respiratory: Positive for shortness of breath. Negative for wheezing and stridor. Cardiovascular: Positive for leg swelling. Negative for chest pain. Gastrointestinal: Negative for abdominal distention, abdominal pain, constipation and diarrhea. Endocrine: Negative for cold intolerance and heat intolerance. Genitourinary: Positive for difficulty urinating. Negative for dysuria and enuresis. Musculoskeletal: Positive for back pain. Negative for arthralgias and gait problem. Skin: Negative for color change, pallor and rash. Neurological: Negative for dizziness, facial asymmetry and headaches. Psychiatric/Behavioral: Negative for agitation and confusion. ROS: A 10 point review of systems was conducted, significant findings as noted in HPI. Physical Exam  Constitutional:       Appearance: He is obese. HENT:      Head: Normocephalic and atraumatic. Nose: Nose normal.      Mouth/Throat:      Mouth: Mucous membranes are moist.      Pharynx: Oropharynx is clear. Cardiovascular:      Rate and Rhythm: Tachycardia present. Rhythm irregular. Pulses: Normal pulses. Heart sounds: Normal heart sounds. No murmur heard. No friction rub. Comments: JVC significantly elevated  Pulmonary:      Breath sounds: Rales present. No wheezing.    Abdominal:      General: Abdomen is flat. Bowel sounds are normal.      Palpations: Abdomen is soft. Musculoskeletal:         General: Swelling present. No tenderness. Right lower le+ Edema present. Left lower le+ Edema present. Skin:     General: Skin is warm and dry. Capillary Refill: Capillary refill takes less than 2 seconds. Neurological:      General: No focal deficit present. Mental Status: He is alert and oriented to person, place, and time. Psychiatric:         Mood and Affect: Mood normal.         Behavior: Behavior normal.       Physical exam:       Vitals:    07/15/21 0015   BP: 104/75   Pulse: 124   Resp: 18   Temp: 98 °F (36.7 °C)   SpO2: 99%       DATA:    Labs:  CBC:   Recent Labs     21   WBC 4.0   HGB 11.6*   HCT 34.4*          BMP:   Recent Labs     21   *   K 5.5*      CO2 25   BUN 54*   CREATININE 1.6*   GLUCOSE 146*     LFT's:   Recent Labs     21   AST 29   ALT 11   BILITOT 0.5   ALKPHOS 61     Troponin:   Recent Labs     21   TROPONINI 0.04*     BNP:No results for input(s): BNP in the last 72 hours. ABGs: No results for input(s): PHART, [id number], PO2ART in the last 72 hours. INR: No results for input(s): INR in the last 72 hours. U/A:  Recent Labs     21   COLORU Yellow   PHUR 5.0   CLARITYU Clear   SPECGRAV 1.020   LEUKOCYTESUR Negative   UROBILINOGEN 0.2   BILIRUBINUR Negative   BLOODU Negative   GLUCOSEU Negative       XR CHEST PORTABLE   Final Result      No acute disease. ASSESSMENT AND PLAN:  Gustavo Encinas is a 68 y/o male with a PMH of HTN, DMt2, DDD, BPH, and obesity who presents with one day hx of SOB. He was rate controlled with     #Acute Decompensated Heart Failure  -BNP 3,018. Increased SOB with activity, lower extremity edema, and 9 lb weight gain in 3 days.   -[place] Heart Association Class 3.   -Lasix 40 mg IV BID  -Echo to evaluate, no hx of echo or BNP for comparison    #Atrial Fibrillation w/ RVR  --140s, Irregularly irregular  -Received Diltiazem drip, patient became hypotensive and diltiazem was discontinued  -Started amiodarone  -CHADSVASc: 4 (4.8% stoke risk per year)    #ELLEN  -Secondary to CHF not perfusing kidneys  -BUN/Cr ratio > 20, prerenal  -Trend creatinine    #DMt2  -discontinued home medications  -follow blood glucose  -low dose sliding scale    #Macrocytic anemia  -hgb 11.6, .9  -Order folate and B12    #YOHANA  -Hx of stage 3 obesity  -Outpatient sleep study  -STOP BAN: High Risk for YOHANA    #Insomnia  -continue home dose trazodone 50 mg nightly    Will discuss with attending physician Dr. Jonathan Nevarez Status:Full code  FEN: Full diet  PPX: Heparin  DISPO: IP    Vera Nowak MD  7/15/2021,  1:44 AM    I saw the patient independently from the resident . I discussed the care with the resident. I personally reviewed the HPI, PH, FH, SH, ROS and medications. I repeated pertinent portions of the examination and reviewed the relevant imaging and laboratory data. I agree with the findings, assessment and plan as documented. addition to: Patient is admitted for A. fib with RVR, reported having paroxysmal palpitations in the past.  On exam difficult to assess intravascular fluid status with physical exam given patient with a BMI of 50. But clinically and history being suspicion for cardiac heart failure. Consulted cardiology for further assistance. There is suspicion of patient having YOHANA will benefit from outpatient sleep study medicine. Will defer further with primary for further assistance.

## 2021-07-16 ENCOUNTER — APPOINTMENT (OUTPATIENT)
Dept: VASCULAR LAB | Age: 76
DRG: 291 | End: 2021-07-16
Payer: MEDICARE

## 2021-07-16 LAB
ANION GAP SERPL CALCULATED.3IONS-SCNC: 10 MMOL/L (ref 3–16)
ANTI-XA UNFRAC HEPARIN: 0.52 IU/ML (ref 0.3–0.7)
BASOPHILS ABSOLUTE: 0 K/UL (ref 0–0.2)
BASOPHILS RELATIVE PERCENT: 0.5 %
BUN BLDV-MCNC: 43 MG/DL (ref 7–20)
CALCIUM SERPL-MCNC: 9.2 MG/DL (ref 8.3–10.6)
CHLORIDE BLD-SCNC: 99 MMOL/L (ref 99–110)
CHOLESTEROL, TOTAL: 127 MG/DL (ref 0–199)
CO2: 30 MMOL/L (ref 21–32)
CREAT SERPL-MCNC: 2 MG/DL (ref 0.8–1.3)
EOSINOPHILS ABSOLUTE: 0.1 K/UL (ref 0–0.6)
EOSINOPHILS RELATIVE PERCENT: 2.7 %
GFR AFRICAN AMERICAN: 39
GFR NON-AFRICAN AMERICAN: 33
GLUCOSE BLD-MCNC: 124 MG/DL (ref 70–99)
GLUCOSE BLD-MCNC: 129 MG/DL (ref 70–99)
GLUCOSE BLD-MCNC: 133 MG/DL (ref 70–99)
GLUCOSE BLD-MCNC: 134 MG/DL (ref 70–99)
GLUCOSE BLD-MCNC: 150 MG/DL (ref 70–99)
HCT VFR BLD CALC: 35.9 % (ref 40.5–52.5)
HDLC SERPL-MCNC: 38 MG/DL (ref 40–60)
HEMOGLOBIN: 12.4 G/DL (ref 13.5–17.5)
LDL CHOLESTEROL CALCULATED: 71 MG/DL
LYMPHOCYTES ABSOLUTE: 1.5 K/UL (ref 1–5.1)
LYMPHOCYTES RELATIVE PERCENT: 29.3 %
MAGNESIUM: 2 MG/DL (ref 1.8–2.4)
MCH RBC QN AUTO: 35.2 PG (ref 26–34)
MCHC RBC AUTO-ENTMCNC: 34.5 G/DL (ref 31–36)
MCV RBC AUTO: 101.8 FL (ref 80–100)
MONOCYTES ABSOLUTE: 0.6 K/UL (ref 0–1.3)
MONOCYTES RELATIVE PERCENT: 11.6 %
NEUTROPHILS ABSOLUTE: 2.8 K/UL (ref 1.7–7.7)
NEUTROPHILS RELATIVE PERCENT: 55.9 %
PDW BLD-RTO: 12.9 % (ref 12.4–15.4)
PERFORMED ON: ABNORMAL
PLATELET # BLD: 155 K/UL (ref 135–450)
PMV BLD AUTO: 8.6 FL (ref 5–10.5)
POTASSIUM SERPL-SCNC: 4 MMOL/L (ref 3.5–5.1)
RBC # BLD: 3.52 M/UL (ref 4.2–5.9)
SODIUM BLD-SCNC: 139 MMOL/L (ref 136–145)
TRIGL SERPL-MCNC: 91 MG/DL (ref 0–150)
VLDLC SERPL CALC-MCNC: 18 MG/DL
WBC # BLD: 5.1 K/UL (ref 4–11)

## 2021-07-16 PROCEDURE — 99233 SBSQ HOSP IP/OBS HIGH 50: CPT | Performed by: INTERNAL MEDICINE

## 2021-07-16 PROCEDURE — 6370000000 HC RX 637 (ALT 250 FOR IP): Performed by: STUDENT IN AN ORGANIZED HEALTH CARE EDUCATION/TRAINING PROGRAM

## 2021-07-16 PROCEDURE — 2580000003 HC RX 258: Performed by: STUDENT IN AN ORGANIZED HEALTH CARE EDUCATION/TRAINING PROGRAM

## 2021-07-16 PROCEDURE — 80061 LIPID PANEL: CPT

## 2021-07-16 PROCEDURE — 83735 ASSAY OF MAGNESIUM: CPT

## 2021-07-16 PROCEDURE — 80048 BASIC METABOLIC PNL TOTAL CA: CPT

## 2021-07-16 PROCEDURE — 6370000000 HC RX 637 (ALT 250 FOR IP)

## 2021-07-16 PROCEDURE — 85025 COMPLETE CBC W/AUTO DIFF WBC: CPT

## 2021-07-16 PROCEDURE — 36415 COLL VENOUS BLD VENIPUNCTURE: CPT

## 2021-07-16 PROCEDURE — 6370000000 HC RX 637 (ALT 250 FOR IP): Performed by: INTERNAL MEDICINE

## 2021-07-16 PROCEDURE — 93970 EXTREMITY STUDY: CPT

## 2021-07-16 PROCEDURE — 2060000000 HC ICU INTERMEDIATE R&B

## 2021-07-16 PROCEDURE — 85520 HEPARIN ASSAY: CPT

## 2021-07-16 PROCEDURE — 6360000002 HC RX W HCPCS: Performed by: INTERNAL MEDICINE

## 2021-07-16 PROCEDURE — 6360000002 HC RX W HCPCS: Performed by: STUDENT IN AN ORGANIZED HEALTH CARE EDUCATION/TRAINING PROGRAM

## 2021-07-16 RX ORDER — DIGOXIN 0.25 MG/ML
250 INJECTION INTRAMUSCULAR; INTRAVENOUS ONCE
Status: COMPLETED | OUTPATIENT
Start: 2021-07-16 | End: 2021-07-16

## 2021-07-16 RX ADMIN — Medication 10 ML: at 22:10

## 2021-07-16 RX ADMIN — APIXABAN 5 MG: 5 TABLET, FILM COATED ORAL at 22:10

## 2021-07-16 RX ADMIN — TRAZODONE HYDROCHLORIDE 50 MG: 50 TABLET ORAL at 22:10

## 2021-07-16 RX ADMIN — DIGOXIN 250 MCG: 250 INJECTION, SOLUTION INTRAMUSCULAR; INTRAVENOUS; PARENTERAL at 09:57

## 2021-07-16 RX ADMIN — OXYCODONE HYDROCHLORIDE AND ACETAMINOPHEN 2 TABLET: 5; 325 TABLET ORAL at 10:34

## 2021-07-16 RX ADMIN — APIXABAN 5 MG: 5 TABLET, FILM COATED ORAL at 12:10

## 2021-07-16 RX ADMIN — PANTOPRAZOLE SODIUM 40 MG: 40 TABLET, DELAYED RELEASE ORAL at 06:19

## 2021-07-16 RX ADMIN — INSULIN LISPRO 1 UNITS: 100 INJECTION, SOLUTION INTRAVENOUS; SUBCUTANEOUS at 12:30

## 2021-07-16 RX ADMIN — OXYCODONE HYDROCHLORIDE AND ACETAMINOPHEN 2 TABLET: 5; 325 TABLET ORAL at 22:10

## 2021-07-16 RX ADMIN — LEVOTHYROXINE SODIUM 75 MCG: 0.07 TABLET ORAL at 06:19

## 2021-07-16 RX ADMIN — FUROSEMIDE 5 MG/HR: 10 INJECTION INTRAMUSCULAR; INTRAVENOUS at 12:11

## 2021-07-16 RX ADMIN — POLYETHYLENE GLYCOL (3350) 17 G: 17 POWDER, FOR SOLUTION ORAL at 12:33

## 2021-07-16 RX ADMIN — TAMSULOSIN HYDROCHLORIDE 0.4 MG: 0.4 CAPSULE ORAL at 07:43

## 2021-07-16 RX ADMIN — AMIODARONE HYDROCHLORIDE 0.5 MG/MIN: 50 INJECTION, SOLUTION INTRAVENOUS at 09:57

## 2021-07-16 ASSESSMENT — PAIN DESCRIPTION - PROGRESSION
CLINICAL_PROGRESSION: GRADUALLY WORSENING
CLINICAL_PROGRESSION: NOT CHANGED

## 2021-07-16 ASSESSMENT — ENCOUNTER SYMPTOMS
VOMITING: 0
COUGH: 0
NAUSEA: 0
PHOTOPHOBIA: 0
SHORTNESS OF BREATH: 0
BLOOD IN STOOL: 0
CHOKING: 0
CHEST TIGHTNESS: 0
ABDOMINAL PAIN: 0
ABDOMINAL DISTENTION: 0

## 2021-07-16 ASSESSMENT — PAIN SCALES - GENERAL
PAINLEVEL_OUTOF10: 0
PAINLEVEL_OUTOF10: 5
PAINLEVEL_OUTOF10: 0
PAINLEVEL_OUTOF10: 7
PAINLEVEL_OUTOF10: 7

## 2021-07-16 ASSESSMENT — PAIN - FUNCTIONAL ASSESSMENT: PAIN_FUNCTIONAL_ASSESSMENT: ACTIVITIES ARE NOT PREVENTED

## 2021-07-16 ASSESSMENT — PAIN DESCRIPTION - PAIN TYPE
TYPE: CHRONIC PAIN
TYPE: CHRONIC PAIN

## 2021-07-16 ASSESSMENT — PAIN DESCRIPTION - LOCATION
LOCATION: BACK
LOCATION: BACK

## 2021-07-16 ASSESSMENT — PAIN DESCRIPTION - FREQUENCY
FREQUENCY: CONTINUOUS
FREQUENCY: CONTINUOUS

## 2021-07-16 ASSESSMENT — PAIN DESCRIPTION - ORIENTATION
ORIENTATION: LOWER
ORIENTATION: LOWER

## 2021-07-16 ASSESSMENT — PAIN SCALES - WONG BAKER: WONGBAKER_NUMERICALRESPONSE: 0

## 2021-07-16 ASSESSMENT — PAIN DESCRIPTION - ONSET
ONSET: ON-GOING
ONSET: ON-GOING

## 2021-07-16 ASSESSMENT — PAIN DESCRIPTION - DESCRIPTORS
DESCRIPTORS: ACHING
DESCRIPTORS: ACHING

## 2021-07-16 NOTE — PROGRESS NOTES
Physician Progress Note      PATIENTRexine Good  CSN #:                  [id number]  :                       1945  ADMIT DATE:       2021 7:22 PM  [address] DATE:  RESPONDING  PROVIDER #:        Evaristo Brito          QUERY TEXT:    Patient admitted with BMI 50.07. Noted \"YOHANA- Hx of stage 3 obesity\". If   possible, please document in progress notes and discharge summary if you are   evaluating and /or treating any of the following: The medical record reflects the following:  Risk Factors: NA  Clinical Indicators: HT: 5'10\", WT: 348 lbs, BMI: 50.07  Treatment: NA  Options provided:  -- Morbid obesity  -- BMI not clinically significant  -- Other - I will add my own diagnosis  -- Disagree - Not applicable / Not valid  -- Disagree - Clinically unable to determine / Unknown  -- Refer to Clinical Documentation Reviewer    PROVIDER RESPONSE TEXT:    This patient has morbid obesity. Query created by:  Avril Ayala on 7/15/2021 10:01 AM      Electronically signed by:  Evaristo Brito 2021 8:20 AM

## 2021-07-16 NOTE — CARE COORDINATION
Case Management Assessment           Daily Note                 Date/ Time of Note: 7/16/2021 11:40 AM         Note completed by: Brenna Villasenor RN    Patient Name: Aston Velazco  YOB: 1945    Diagnosis:Atrial fibrillation with RVR ([address] [I48.91]  Patient Admission Status: Inpatient    Date of Admission:7/14/2021  7:22 PM Length of Stay: 2 GLOS: GMLOS: 3.1    Current Plan of Care: lasix gtt, amio gtt  ________________________________________________________________________________________  PT AM-PAC:   / 24 per last evaluation on: pending     OT AM-PAC:   / 24 per last evaluation on: pending    DME Needs for discharge: tbd  ________________________________________________________________________________________  Discharge Plan: Home with [address] [place] [address] Way: TBD    Tentative discharge date: TBD    Current barriers to discharge: medical clearance    Referrals completed: Home Health Care: [facility]      ________________________________________________________________________________________  Case Management Notes:  Patient lives at home alone, has a girlfriend that stays with his half time. [facility], [facility]. PT/OT pending at this time. Family to transport home at discharge. Jolie Griffiths and his family were provided with choice of provider; he and his family are in agreement with the discharge plan.     Care Transition Patient: Concepcion Villasenor RN  [facility]  Case Management Department  Ph: [phone]  Fax: [phone]

## 2021-07-16 NOTE — PLAN OF CARE
Problem: Falls - Risk of:  Goal: Will remain free from falls  Description: Will remain free from falls  Outcome: Ongoing  Note: Pt is alert and oriented x 4. No attempts to get up on own. Chair alarm, call light within reach. Pt calls appropriately for assistance. Problem: Pain:  Goal: Control of chronic pain  Description: Control of chronic pain  Outcome: Ongoing  Note: Pt c/o chronic back pain. Administered percocet and pt reports improvement. Problem: Fluid Volume - Imbalance:  Goal: Absence of imbalanced fluid volume signs and symptoms  Description: Absence of imbalanced fluid volume signs and symptoms  Outcome: Ongoing  Note: +1-2 edema to BLE, left > right. LE dopplers ordered. Lasix gtt at 5 mg/hr, pt voiding per urinal.     Problem: Cardiac Output - Decreased:  Goal: Hemodynamic stability will improve  Description: Hemodynamic stability will improve  Outcome: Ongoing  Note: Vitals stable, afib on tele HR better controlled today while at rest 90-120s, afib. Received IV dig x 1. Amio gtt @ 0.5 mg/min.

## 2021-07-16 NOTE — PROGRESS NOTES
Anti Xa currently 0.52. Heparin protocol reviewed. No changes to drip at this time. Will continue to monitor.

## 2021-07-16 NOTE — PROGRESS NOTES
Progress Note    Admit Date: 7/14/2021  Day: 2  Diet: ADULT DIET; Regular; Low Sodium (2 gm); 1500 ml    CC: Shortness of breath     Interval history:  No acute events overnight. Patient seen and examined at [facility] side.   Patient denies,shortness of breath, chest pain, chills, nausea, vomiting. He denies urinary frequency, dysuria. He mentioned that his urinary stream has decreased in intensity. Patient is hemodynamically stable and afebrile He remains on heparin sc        Medications:     Scheduled Meds:   levothyroxine  75 mcg Oral Daily    sodium chloride flush  5-40 mL Intravenous 2 times per day    traZODone  50 mg Oral Nightly    insulin lispro  0-6 Units Subcutaneous TID WC    insulin lispro  0-3 Units Subcutaneous Nightly    tamsulosin  0.4 mg Oral Daily    pantoprazole  40 mg Oral QAM AC     Continuous Infusions:   amiodarone 0.5 mg/min (07/16/21 0957)    sodium chloride      furosemide (LASIX) 1mg/ml infusion 5 mg/hr (07/15/21 1636)    dextrose      heparin (PORCINE) Infusion 8 Units/kg/hr (07/15/21 2232)     PRN Meds:sodium chloride flush, sodium chloride, ondansetron **OR** ondansetron, polyethylene glycol, acetaminophen **OR** acetaminophen, perflutren lipid microspheres, glucose, dextrose, glucagon (rDNA), dextrose, oxyCODONE-acetaminophen **OR** oxyCODONE-acetaminophen, heparin (porcine), heparin (porcine)    Objective:   Vitals:   T-max:  Patient Vitals for the past 8 hrs:   BP Temp Temp src Pulse Resp SpO2 Weight   07/16/21 0957 -- -- -- 125 -- -- --   07/16/21 0743 100/72 98.7 °F (37.1 °C) Oral 104 16 -- --   07/16/21 0406 122/87 98.1 °F (36.7 °C) Oral 115 17 98 % (!) 332 lb 8 oz (150.8 kg)       Intake/Output Summary (Last 24 hours) at 7/16/2021 1013  Last data filed at 7/16/2021 1008  Gross per 24 hour   Intake 1467 ml   Output 6500 ml   Net -5033 ml       ROS: A 10 point review of systems was conducted, significant findings as noted in HPI.     Physical Exam  Constitutional: Appearance: Normal appearance. He is obese. HENT:      Head: Normocephalic. Mouth/Throat:      Mouth: Mucous membranes are moist.   Eyes:      Extraocular Movements: Extraocular movements intact. Conjunctiva/sclera: Conjunctivae normal.      Pupils: Pupils are equal, round, and reactive to light. Cardiovascular:      Rate and Rhythm: Regular rhythm. Tachycardia present. Pulses: Normal pulses. Heart sounds: Normal heart sounds. Pulmonary:      Effort: Pulmonary effort is normal.      Breath sounds: Normal breath sounds. Abdominal:      General: Abdomen is flat. Bowel sounds are normal.      Palpations: Abdomen is soft. Musculoskeletal:         General: Normal range of motion. Cervical back: Normal range of motion. Right lower le+ Edema present. Left lower le+ Edema present. Neurological:      General: No focal deficit present. Mental Status: He is alert and oriented to person, place, and time. Mental status is at baseline. Psychiatric:         Mood and Affect: Mood normal.         Behavior: Behavior normal.         LABS:    CBC:   Recent Labs     07/14/21  1942 07/15/21  1355 21  0521   WBC 4.0 3.8* 5.1   HGB 11.6* 12.4* 12.4*   HCT 34.4* 36.3* 35.9*    146 155   .9* 103.3* 101.8*     Renal:    Recent Labs     07/14/21  1942 07/15/21  0249 07/15/21  0447 21  0521   * 136  --  139   K 5.5* 4.2  --  4.0    100  --  99   CO2 25 26  --  30   BUN 54* 49*  --  43*   CREATININE 1.6* 1.8*  --  2.0*   GLUCOSE 146* 140*  --  124*   CALCIUM 9.1 8.7  --  9.2   MG  --   --  2.20 2.00   ANIONGAP 10 10  --  10     Hepatic:   Recent Labs     21   AST 29   ALT 11   BILITOT 0.5   PROT 6.8   LABALBU 4.0   ALKPHOS 61     Troponin:   Recent Labs     07/15/21  0126 07/15/21  0249 07/15/21  0959   TROPONINI 0.02* 0.03* 0.03*     BNP: No results for input(s): BNP in the last 72 hours.   Lipids: No results for input(s): CHOL, HDL in the last 72 hours. Invalid input(s): LDLCALCU, TRIGLYCERIDE  ABGs:  No results for input(s): PHART, UYW3FST, PO2ART, GXS2ENK, BEART, THGBART, L2LQTYKI, ZBO7IEB in the last 72 hours. INR:   Recent Labs     07/15/21  1355   INR 1.02     Lactate: No results for input(s): LACTATE in the last 72 hours. Cultures:  -----------------------------------------------------------------  RAD:   XR CHEST PORTABLE   Final Result      No acute disease. VL Extremity Venous Bilateral    (Results Pending)       Assessment/Plan:   Korin Maldonado is a 67 y.o. male with a PMH of HTN, T2DM, BPH, and obesity. He  presents to the ED with SOB for 1  day. He was admitted for work-up and management of  Acute heart failure decompensation. He is currently hemodynamically stable and afebrile.     Acute decompensated heart failure   Shortness of breath, bilateral CECI edema, JVC elevated. Weight 330 lb (5/21) 3332 now. BNP 3018. Normal WBC. Normal UA. Troponin elevated due to demand ischemia Echo normal EF, no wall motion abnormalities. -F/u daily weightm, IsOs,  -Low fluid, low sodium diet  -Continue Lasix 40 mg IV BID  -Continue digoxin   -F/u cardiology     Atrial fibrillation with RVR   on admission, 120-10s now. Received diltiazem, became hypotensive. Diltiazem discontinued, started on amiodarone. CHADSVASc: 4.  -Continue amiodarone  -Continue heparin drip     ELLEN 2/2 to decompensated HF  BUN/Cr ratio >20. Cr 1.6 at admission now 2. Baseline Cr 1.36. Patient is on diuresis. -Trend creatinine     T2DM  A1C 6 (2/21)  -follow blood glucose  -low dose sliding scale     Macrocytic anemia   Hgb 11.6, .9. Normal folate, b12 levels. Reports no alcohol abuse.      YOHANA   BMI 50  -Outpatient sleep study     BPH  PSA 4.13 (4/21).  Patient reports his urinary stream has decreased in intensity.   -Start FLOMAX     Code Status: Full Code  FEN: Regular Diet  PPX: Heparin drip  DISPO: KEVIN Garcia MD, PGY-1  07/16/21  10:13 AM    This patient has been staffed and discussed with Arabella Edwards MD.   Patient seen and examined, labs and imaging studies reviewed, agree with assessment and plan as outlined above. Continue with current care and plan. Discussed case with patients nurse, discussed case with care team, discussed plan. He will need sleep study as outpatient.      Arabella Edwards MD [address]

## 2021-07-16 NOTE — PROGRESS NOTES
Progress Note    Admit Date: 7/14/2021  Day: 2  Diet: ADULT DIET; Regular; Low Sodium (2 gm); 1500 ml    Interval history: No acute overnight events. Patient seen and examined in AM. AOx3, in no acute distress, laying comfortably in bed. Patient says he feels \"about the same\" from yesterday, and has noticed decreased leg swelling. He continues to have difficulty with force of urination but says \"it's getting better from yesterday\". He no longer feels any burning on urination, and denies any dizziness on standing during the last 24 hours. Patient is afebrile and HDS.       Medications:     Scheduled Meds:   levothyroxine  75 mcg Oral Daily    sodium chloride flush  5-40 mL Intravenous 2 times per day    traZODone  50 mg Oral Nightly    insulin lispro  0-6 Units Subcutaneous TID WC    insulin lispro  0-3 Units Subcutaneous Nightly    tamsulosin  0.4 mg Oral Daily    pantoprazole  40 mg Oral QAM AC     Continuous Infusions:   amiodarone 0.5 mg/min (07/16/21 0957)    sodium chloride      furosemide (LASIX) 1mg/ml infusion 5 mg/hr (07/15/21 1636)    dextrose      heparin (PORCINE) Infusion 8 Units/kg/hr (07/15/21 2232)     PRN Meds:sodium chloride flush, sodium chloride, ondansetron **OR** ondansetron, polyethylene glycol, acetaminophen **OR** acetaminophen, perflutren lipid microspheres, glucose, dextrose, glucagon (rDNA), dextrose, oxyCODONE-acetaminophen **OR** oxyCODONE-acetaminophen, heparin (porcine), heparin (porcine)    Objective:   Vitals:   T-max:  Patient Vitals for the past 8 hrs:   BP Temp Temp src Pulse Resp SpO2 Weight   07/16/21 0957    125      07/16/21 0743 100/72 98.7 °F (37.1 °C) Oral 104 16     07/16/21 0406 122/87 98.1 °F (36.7 °C) Oral 115 17 98 % (!) 332 lb 8 oz (150.8 kg)       Intake/Output Summary (Last 24 hours) at 7/16/2021 1014  Last data filed at 7/16/2021 1008  Gross per 24 hour   Intake 1467 ml   Output 6500 ml   Net -5033 ml     Review of Systems   Constitutional: Negative. HENT: Negative. Eyes: Negative for photophobia and visual disturbance. Respiratory: Negative for cough, choking, chest tightness and shortness of breath. Cardiovascular: Negative for chest pain, palpitations and leg swelling. Gastrointestinal: Negative for abdominal distention, abdominal pain, blood in stool, nausea and vomiting. Genitourinary: Positive for difficulty urinating. Negative for decreased urine volume, dysuria, enuresis and urgency. Skin: Negative. Neurological: Positive for headaches (Mild, began this morning, 1/10 pain). Negative for dizziness, syncope, weakness, light-headedness and numbness. Hematological: Negative. Psychiatric/Behavioral: Negative. Physical Exam  Constitutional:       Appearance: He is obese. HENT:      Head: Normocephalic and atraumatic. Mouth/Throat:      Mouth: Mucous membranes are moist.   Eyes:      Extraocular Movements: Extraocular movements intact. Pupils: Pupils are equal, round, and reactive to light. Cardiovascular:      Rate and Rhythm: Tachycardia present. Rhythm irregular. Pulses: Normal pulses. Comments: Soft heart sounds  2+ BL radial pulse  2+ BL dorsalis pedis pulse  Pulmonary:      Effort: Pulmonary effort is normal.      Breath sounds: Normal breath sounds. Chest:      Chest wall: No tenderness. Abdominal:      Palpations: Abdomen is soft. There is no mass. Tenderness: There is no abdominal tenderness. There is no guarding. Musculoskeletal:         General: Normal range of motion. Cervical back: Normal range of motion. Right lower leg: Edema present. Left lower leg: Edema present. Comments: BL LE edema, 1+ to knee. Skin:     General: Skin is warm and dry. Neurological:      General: No focal deficit present. Mental Status: He is alert and oriented to person, place, and time.    Psychiatric:         Mood and Affect: Mood normal.         Behavior: Behavior normal.           LABS:    CBC:   Recent Labs     07/14/21  1942 07/15/21  1355 07/16/21  0521   WBC 4.0 3.8* 5.1   HGB 11.6* 12.4* 12.4*   HCT 34.4* 36.3* 35.9*    146 155   .9* 103.3* 101.8*     Renal:    Recent Labs     07/14/21  1942 07/15/21  0249 07/15/21  0447 07/16/21  0521   * 136  --  139   K 5.5* 4.2  --  4.0    100  --  99   CO2 25 26  --  30   BUN 54* 49*  --  43*   CREATININE 1.6* 1.8*  --  2.0*   GLUCOSE 146* 140*  --  124*   CALCIUM 9.1 8.7  --  9.2   MG  --   --  2.20 2.00   ANIONGAP 10 10  --  10     Hepatic:   Recent Labs     07/14/21 1942   AST 29   ALT 11   BILITOT 0.5   PROT 6.8   LABALBU 4.0   ALKPHOS 61     Troponin:   Recent Labs     07/15/21  0126 07/15/21  0249 07/15/21  0959   TROPONINI 0.02* 0.03* 0.03*     BNP: No results for input(s): BNP in the last 72 hours. Lipids: No results for input(s): CHOL, HDL in the last 72 hours. Invalid input(s): LDLCALCU, TRIGLYCERIDE  ABGs:  No results for input(s): PHART, YIM9HAP, PO2ART, DUP5DNC, BEART, THGBART, D7NPADFE, IZS7FBK in the last 72 hours. INR:   Recent Labs     07/15/21  1355   INR 1.02     Lactate: No results for input(s): LACTATE in the last 72 hours. Cultures:  -----------------------------------------------------------------  RAD:   XR CHEST PORTABLE   Final Result      No acute disease. VL Extremity Venous Bilateral    (Results Pending)         Assessment/Plan:   Aminah Martinez is a 68 yo M with a PMHx of T2DM, HTN, BPH, and obesity who presented to the ED with one day hx of increasing exertional SOB.      Acute decompensated heart failure likely 2/2 afib with RVR  Increasing exertional dyspnea  Obesity (BMI >50) with low amounts of ADLs. NYHA class III.   Improving BL lower extremity edema to knee  EKG 7/15/2021 showed new-onset Atrial fibrillation with rapid ventricular response, low voltage QRS, non-specific ST and T wave abnormalities  Patient is going to see a cardiologist for the first time next week  - Echo 7/15 showed mild concentric LVH, EF 55%, aortic root dilation (4 cm), enlarged R ventricle with preserved systolic function, no pleural or pericardial effusion.  - Lasix 40 bid  - I/O -1065, 20 lbs down from admission  - Continue amiodarone  - Heparin (CHADSVASc score >3)  - On telemetry  - Patient to f/u with OP cardiologist next week     ELLEN 2/2 hypoperfusion d/t CHF  BUN/Cr ratio on admission >20  OA BUN 54, Cr 1.6, now BUN 43, Cr 2  Downtrending GFR 33, OA 42  - Patient remains on lasix d/t acute exacerbation of heart failure  - I/O -1065  - Increase in urine output  - Continue to trend creatinine  - Nephrology following, call if significant decrease in urine output     BPH  Patient says he's having a harder time urinating while admitted.   - Continue home tamsulosin  - Follow OP     T2DM  BG in the 120s-130s  - Stop home medications  - Low dose sliding scale insulin  - HbA1c  - Continue to monitor with POC glucose  - Follow OP     Macrocytic anemia  .8  - Folate 13.13  - B12 794  - Continue to monitor MCV     Likely YOHANA  BMI >50, increased neck girth, and decreased exertional activity  - Order sleep study OP  - Monitor overnight for changes in tele or SpO2  - CPAP if needed while in-hospital     Code Status: FULL CODE  FEN: Regular diet  PPX: Heparin sc  DISPO: KEVIN Jefferson, MS-4  07/16/21  10:14 AM     This patient has been staffed and discussed with Felicia Gilman MD.

## 2021-07-16 NOTE — PROGRESS NOTES
[facility], [place] Daily Progress Note      Admit Date:  7/14/2021    CC: Follow up AF RVR    Subjective:  Mr. Kristy Sanders feels better    Objective:   /69   Pulse 92   Temp 98.4 °F (36.9 °C) (Oral)   Resp 16   Ht 5' 10\" (1.778 m)   Wt (!) 332 lb 8 oz (150.8 kg)   SpO2 96%   BMI 47.71 kg/m²       Intake/Output Summary (Last 24 hours) at 7/16/2021 1717  Last data filed at 7/16/2021 1459  Gross per 24 hour   Intake 1672 ml   Output 5475 ml   Net -3803 ml       TELEMETRY: Atrial fibrillation    Physical Exam:  General:  Awake, alert, NAD  Eyes:  EOMI PERRL anicteric  Skin:  Warm and dry. Neck:  JVP normal  Chest:  Diminshed. Rales. Cardiovascular:  Irregularly irregular  Normal S1S2. No Murmur. No Rub. No Gallop. Abdomen:  Soft NT  + bs  Extremities:  ++ edema  Neuro:  CN II-XII intact. No focal deficits. No weakness. No lateralizing findings. Psych:  Normal thought and affect. MSK:  No Cyanosis nor Clubbing.   Symmetrical strength upper and lower extremities    Medications:    apixaban  5 mg Oral BID    levothyroxine  75 mcg Oral Daily    sodium chloride flush  5-40 mL Intravenous 2 times per day    traZODone  50 mg Oral Nightly    insulin lispro  0-6 Units Subcutaneous TID WC    insulin lispro  0-3 Units Subcutaneous Nightly    tamsulosin  0.4 mg Oral Daily    pantoprazole  40 mg Oral QAM AC      amiodarone 0.5 mg/min (07/16/21 0957)    sodium chloride      furosemide (LASIX) 1mg/ml infusion 5 mg/hr (07/16/21 1211)    dextrose       sodium chloride flush, sodium chloride, ondansetron **OR** ondansetron, polyethylene glycol, acetaminophen **OR** acetaminophen, perflutren lipid microspheres, glucose, dextrose, glucagon (rDNA), dextrose, oxyCODONE-acetaminophen **OR** oxyCODONE-acetaminophen    Lab Data:  CBC:   Recent Labs     07/14/21  1942 07/15/21  1355 07/16/21  0521   WBC 4.0 3.8* 5.1   HGB 11.6* 12.4* 12.4*   HCT 34.4* 36.3* 35.9*   .9* 103.3* 101.8*    146 155 BMP:   Recent Labs     07/14/21  1942 07/15/21  0249 07/16/21  0521   * 136 139   K 5.5* 4.2 4.0    100 99   CO2 25 26 30   BUN 54* 49* 43*   CREATININE 1.6* 1.8* 2.0*     LIVER PROFILE:   Recent Labs     07/14/21 1942   AST 29   ALT 11   BILITOT 0.5   ALKPHOS 61     PT/INR:   Recent Labs     07/15/21  1355   PROTIME 11.5   INR 1.02     APTT:   Recent Labs     07/15/21  1355   APTT 37.4         Assessment:  Patient Active Problem List    Diagnosis Date Noted    Atrial fibrillation with RVR ([facility] [address] 07/14/2021       Plan:  1. HF:  Continue diuresis with furosemide gtt. Improving. Acute diastolic HF with LVEF 76% on echo. 2. AF RVR:  Continue amiodarone at 0.25 mg/min. Noted pt Back on eliquis.    3. CRI:  Creat at 2.0.  follow    Core Measures:  · Discharge instructions:   · LVEF documented:   · ACEI for LV dysfunction:   · Smoking Cessation:    Yudelka Velazquez MD, MD 7/16/2021 5:17 PM

## 2021-07-17 LAB
ANION GAP SERPL CALCULATED.3IONS-SCNC: 14 MMOL/L (ref 3–16)
BASOPHILS ABSOLUTE: 0 K/UL (ref 0–0.2)
BASOPHILS RELATIVE PERCENT: 0.5 %
BUN BLDV-MCNC: 45 MG/DL (ref 7–20)
CALCIUM SERPL-MCNC: 9.1 MG/DL (ref 8.3–10.6)
CHLORIDE BLD-SCNC: 94 MMOL/L (ref 99–110)
CO2: 30 MMOL/L (ref 21–32)
CREAT SERPL-MCNC: 2.1 MG/DL (ref 0.8–1.3)
EOSINOPHILS ABSOLUTE: 0.1 K/UL (ref 0–0.6)
EOSINOPHILS RELATIVE PERCENT: 2.6 %
GFR AFRICAN AMERICAN: 37
GFR NON-AFRICAN AMERICAN: 31
GLUCOSE BLD-MCNC: 112 MG/DL (ref 70–99)
GLUCOSE BLD-MCNC: 129 MG/DL (ref 70–99)
GLUCOSE BLD-MCNC: 132 MG/DL (ref 70–99)
GLUCOSE BLD-MCNC: 139 MG/DL (ref 70–99)
GLUCOSE BLD-MCNC: 152 MG/DL (ref 70–99)
HCT VFR BLD CALC: 36 % (ref 40.5–52.5)
HEMOGLOBIN: 12.7 G/DL (ref 13.5–17.5)
LYMPHOCYTES ABSOLUTE: 1.4 K/UL (ref 1–5.1)
LYMPHOCYTES RELATIVE PERCENT: 32.5 %
MAGNESIUM: 2 MG/DL (ref 1.8–2.4)
MCH RBC QN AUTO: 35.8 PG (ref 26–34)
MCHC RBC AUTO-ENTMCNC: 35.2 G/DL (ref 31–36)
MCV RBC AUTO: 101.8 FL (ref 80–100)
MONOCYTES ABSOLUTE: 0.7 K/UL (ref 0–1.3)
MONOCYTES RELATIVE PERCENT: 15.3 %
NEUTROPHILS ABSOLUTE: 2.2 K/UL (ref 1.7–7.7)
NEUTROPHILS RELATIVE PERCENT: 49.1 %
PDW BLD-RTO: 12.8 % (ref 12.4–15.4)
PERFORMED ON: ABNORMAL
PLATELET # BLD: 153 K/UL (ref 135–450)
PMV BLD AUTO: 8.3 FL (ref 5–10.5)
POTASSIUM SERPL-SCNC: 3.8 MMOL/L (ref 3.5–5.1)
RBC # BLD: 3.54 M/UL (ref 4.2–5.9)
SODIUM BLD-SCNC: 138 MMOL/L (ref 136–145)
WBC # BLD: 4.4 K/UL (ref 4–11)

## 2021-07-17 PROCEDURE — 83735 ASSAY OF MAGNESIUM: CPT

## 2021-07-17 PROCEDURE — 99233 SBSQ HOSP IP/OBS HIGH 50: CPT | Performed by: INTERNAL MEDICINE

## 2021-07-17 PROCEDURE — 2060000000 HC ICU INTERMEDIATE R&B

## 2021-07-17 PROCEDURE — 80048 BASIC METABOLIC PNL TOTAL CA: CPT

## 2021-07-17 PROCEDURE — 85025 COMPLETE CBC W/AUTO DIFF WBC: CPT

## 2021-07-17 PROCEDURE — 6370000000 HC RX 637 (ALT 250 FOR IP): Performed by: STUDENT IN AN ORGANIZED HEALTH CARE EDUCATION/TRAINING PROGRAM

## 2021-07-17 PROCEDURE — 6370000000 HC RX 637 (ALT 250 FOR IP)

## 2021-07-17 PROCEDURE — 2580000003 HC RX 258: Performed by: STUDENT IN AN ORGANIZED HEALTH CARE EDUCATION/TRAINING PROGRAM

## 2021-07-17 PROCEDURE — 36415 COLL VENOUS BLD VENIPUNCTURE: CPT

## 2021-07-17 PROCEDURE — 6370000000 HC RX 637 (ALT 250 FOR IP): Performed by: INTERNAL MEDICINE

## 2021-07-17 PROCEDURE — 6360000002 HC RX W HCPCS: Performed by: STUDENT IN AN ORGANIZED HEALTH CARE EDUCATION/TRAINING PROGRAM

## 2021-07-17 RX ORDER — FUROSEMIDE 40 MG/1
40 TABLET ORAL 2 TIMES DAILY
Status: DISCONTINUED | OUTPATIENT
Start: 2021-07-17 | End: 2021-07-18

## 2021-07-17 RX ADMIN — Medication 10 ML: at 09:27

## 2021-07-17 RX ADMIN — FUROSEMIDE 40 MG: 40 TABLET ORAL at 17:03

## 2021-07-17 RX ADMIN — OXYCODONE HYDROCHLORIDE AND ACETAMINOPHEN 1 TABLET: 5; 325 TABLET ORAL at 13:28

## 2021-07-17 RX ADMIN — APIXABAN 5 MG: 5 TABLET, FILM COATED ORAL at 09:25

## 2021-07-17 RX ADMIN — TRAZODONE HYDROCHLORIDE 50 MG: 50 TABLET ORAL at 21:52

## 2021-07-17 RX ADMIN — PANTOPRAZOLE SODIUM 40 MG: 40 TABLET, DELAYED RELEASE ORAL at 04:02

## 2021-07-17 RX ADMIN — OXYCODONE HYDROCHLORIDE AND ACETAMINOPHEN 2 TABLET: 5; 325 TABLET ORAL at 21:52

## 2021-07-17 RX ADMIN — TAMSULOSIN HYDROCHLORIDE 0.4 MG: 0.4 CAPSULE ORAL at 09:26

## 2021-07-17 RX ADMIN — INSULIN LISPRO 1 UNITS: 100 INJECTION, SOLUTION INTRAVENOUS; SUBCUTANEOUS at 13:29

## 2021-07-17 RX ADMIN — POLYETHYLENE GLYCOL (3350) 17 G: 17 POWDER, FOR SOLUTION ORAL at 17:04

## 2021-07-17 RX ADMIN — Medication 10 ML: at 21:52

## 2021-07-17 RX ADMIN — LEVOTHYROXINE SODIUM 75 MCG: 0.07 TABLET ORAL at 04:02

## 2021-07-17 RX ADMIN — APIXABAN 5 MG: 5 TABLET, FILM COATED ORAL at 21:52

## 2021-07-17 RX ADMIN — FUROSEMIDE 5 MG/HR: 10 INJECTION INTRAMUSCULAR; INTRAVENOUS at 07:00

## 2021-07-17 ASSESSMENT — PAIN SCALES - WONG BAKER
WONGBAKER_NUMERICALRESPONSE: 0

## 2021-07-17 ASSESSMENT — PAIN SCALES - GENERAL
PAINLEVEL_OUTOF10: 0
PAINLEVEL_OUTOF10: 6
PAINLEVEL_OUTOF10: 0
PAINLEVEL_OUTOF10: 0
PAINLEVEL_OUTOF10: 7

## 2021-07-17 ASSESSMENT — PAIN - FUNCTIONAL ASSESSMENT: PAIN_FUNCTIONAL_ASSESSMENT: ACTIVITIES ARE NOT PREVENTED

## 2021-07-17 ASSESSMENT — PAIN DESCRIPTION - LOCATION: LOCATION: BACK

## 2021-07-17 ASSESSMENT — PAIN DESCRIPTION - PROGRESSION: CLINICAL_PROGRESSION: NOT CHANGED

## 2021-07-17 ASSESSMENT — PAIN DESCRIPTION - ONSET: ONSET: ON-GOING

## 2021-07-17 ASSESSMENT — PAIN DESCRIPTION - ORIENTATION: ORIENTATION: LOWER

## 2021-07-17 ASSESSMENT — PAIN DESCRIPTION - DESCRIPTORS: DESCRIPTORS: ACHING

## 2021-07-17 ASSESSMENT — PAIN DESCRIPTION - FREQUENCY: FREQUENCY: CONTINUOUS

## 2021-07-17 ASSESSMENT — PAIN DESCRIPTION - PAIN TYPE: TYPE: CHRONIC PAIN

## 2021-07-17 NOTE — PLAN OF CARE
Problem: Falls - Risk of:  Goal: Will remain free from falls  Description: Will remain free from falls  7/17/2021 0039 by Lacho Will RN  Outcome: Met This Shift  7/16/2021 1702 by Gerry Spears RN  Outcome: Ongoing  Note: Pt is alert and oriented x 4. No attempts to get up on own. Chair alarm, call light within reach. Pt calls appropriately for assistance. Goal: Absence of physical injury  Description: Absence of physical injury  Outcome: Met This Shift     Problem: Pain:  Goal: Pain level will decrease  Description: Pain level will decrease  Outcome: Met This Shift  Goal: Control of acute pain  Description: Control of acute pain  Outcome: Met This Shift  Goal: Control of chronic pain  Description: Control of chronic pain  7/17/2021 0039 by Lacho Will RN  Outcome: Met This Shift  7/16/2021 1702 by Gerry Spears RN  Outcome: Ongoing  Note: Pt c/o chronic back pain. Administered percocet and pt reports improvement. Problem: Fluid Volume - Imbalance:  Goal: Absence of imbalanced fluid volume signs and symptoms  Description: Absence of imbalanced fluid volume signs and symptoms  7/17/2021 0039 by Lacho Will RN  Outcome: Not Met This Shift  7/16/2021 1702 by Gerry Spears RN  Outcome: Ongoing  Note: +1-2 edema to BLE, left > right. LE dopplers ordered. Lasix gtt at 5 mg/hr, pt voiding per urinal.     Problem: Cardiac Output - Decreased:  Goal: Hemodynamic stability will improve  Description: Hemodynamic stability will improve  7/17/2021 0039 by Lacho Will RN  Outcome: Met This Shift  7/16/2021 1702 by Gerry Spears RN  Outcome: Ongoing  Note: Vitals stable, afib on tele HR better controlled today while at rest 90-120s, afib. Received IV dig x 1. Amio gtt @ 0.5 mg/min.      Problem: OXYGENATION/RESPIRATORY FUNCTION  Goal: Patient will maintain patent airway  Outcome: Met This Shift  Goal: Patient will achieve/maintain normal respiratory

## 2021-07-17 NOTE — PROGRESS NOTES
Dr Miguel Baron at bedside to assess. Made aware of 5-10 sec runs of SVT, noted with exertion. He was also made aware of recently noticed redness to RFA (old PIV). Redness outlined.

## 2021-07-17 NOTE — PROGRESS NOTES
[facility] Daily Progress Note      Admit Date:  7/14/2021    Subjective:  Mr. Jennifer Birmingham was seen and examined. F/U AFib/CHF. Breathing better, at baseline. No chest pain. No palp.      Objective:   /72   Pulse 108   Temp 98.2 °F (36.8 °C) (Oral)   Resp 20   Ht 5' 10\" (1.778 m)   Wt (!) 327 lb 2.6 oz (148.4 kg)   SpO2 96%   BMI 46.94 kg/m²       Intake/Output Summary (Last 24 hours) at 7/17/2021 6689  Last data filed at 7/17/2021 0345  Gross per 24 hour   Intake 2157.9 ml   Output 3800 ml   Net -1642.1 ml       TELEMETRY: Atrial fibrillation     Physical Exam:  General:  Awake, alert, NAD  Skin:  Warm and dry  Neck:  JVP difficult  Chest:  Decreased BS, respiration normal  Cardiovascular:  Irreg/irreg S1S2  Abdomen:  Soft nontender  Extremities:  no edema    Medications:    apixaban  5 mg Oral BID    levothyroxine  75 mcg Oral Daily    sodium chloride flush  5-40 mL Intravenous 2 times per day    traZODone  50 mg Oral Nightly    insulin lispro  0-6 Units Subcutaneous TID WC    insulin lispro  0-3 Units Subcutaneous Nightly    tamsulosin  0.4 mg Oral Daily    pantoprazole  40 mg Oral QAM AC      amiodarone 0.25 mg/min (07/16/21 1805)    sodium chloride      furosemide (LASIX) 1mg/ml infusion 5 mg/hr (07/16/21 1211)    dextrose       sodium chloride flush, sodium chloride, ondansetron **OR** ondansetron, polyethylene glycol, acetaminophen **OR** acetaminophen, perflutren lipid microspheres, glucose, dextrose, glucagon (rDNA), dextrose, oxyCODONE-acetaminophen **OR** oxyCODONE-acetaminophen    Lab Data:  CBC:   Recent Labs     07/15/21  1355 07/16/21  0521 07/17/21  0443   WBC 3.8* 5.1 4.4   HGB 12.4* 12.4* 12.7*   HCT 36.3* 35.9* 36.0*   .3* 101.8* 101.8*    155 153     BMP:   Recent Labs     07/15/21  0249 07/16/21  0521 07/17/21  0443    139 138   K 4.2 4.0 3.8    99 94*   CO2 26 30 30   BUN 49* 43* 45*   CREATININE 1.8* 2.0* 2.1*     LIVER PROFILE:   Recent Labs     07/14/21  1942   AST 29   ALT 11   BILITOT 0.5   ALKPHOS 61     PT/INR:   Recent Labs     07/15/21  1355   PROTIME 11.5   INR 1.02     APTT:   Recent Labs     07/15/21  1355   APTT 37.4     BNP:  No results for input(s): BNP in the last 72 hours. IMAGING:     Assessment:  Patient Active Problem List    Diagnosis Date Noted    ELLEN (acute kidney injury) ([facility] [address]     Congestive heart failure (HCC)     Elevated troponin     Persistent atrial fibrillation (HCC)     Atrial fibrillation with RVR ([facility] [address] 07/14/2021       Plan:  1. Good diuresis. Breathing at baseline. On RA. Cr 2.1. Pulse lasix vs PO since breathing at baseline and on RA. Tachy at times. BP soft. Increase amio gtt for now.        Core Measures:  · Discharge instructions:   · LVEF documented:   · ACEI for LV dysfunction:   · Smoking Cessation:    Pelon Castelan MD, MD 7/17/2021 6:38 AM

## 2021-07-17 NOTE — PLAN OF CARE
Problem: Falls - Risk of:  Goal: Absence of physical injury  Description: Absence of physical injury  7/17/2021 0039 by Jama Lund RN  Outcome: Met This Shift     Problem: Pain:  Goal: Pain level will decrease  Description: Pain level will decrease  7/17/2021 0039 by Jama Lund RN  Outcome: Met This Shift  Goal: Control of acute pain  Description: Control of acute pain  7/17/2021 0039 by Jama Lund RN  Outcome: Met This Shift  Goal: Control of chronic pain  Description: Control of chronic pain  7/17/2021 0039 by Jama Lund RN  Outcome: Met This Shift     Problem: Cardiac Output - Decreased:  Goal: Hemodynamic stability will improve  Description: Hemodynamic stability will improve  7/17/2021 0039 by Jama Lund RN  Outcome: Met This Shift     Problem: OXYGENATION/RESPIRATORY FUNCTION  Goal: Patient will maintain patent airway  7/17/2021 0039 by Jama Lund RN  Outcome: Met This Shift  Goal: Patient will achieve/maintain normal respiratory rate/effort  Description: Respiratory rate and effort will be within normal limits for the patient  7/17/2021 0039 by Jama Lund RN  Outcome: Met This Shift     Problem: HEMODYNAMIC STATUS  Goal: Patient has stable vital signs and fluid balance  7/17/2021 0039 by Jama Lund RN  Outcome: Met This Shift

## 2021-07-17 NOTE — PROGRESS NOTES
Nephrology  Note                                                                                                                                                                                                                                                                                                                                                               Office : [phone]     Fax :[phone]              Patient's Name: Raquel Florentino worse   Good UO   No N/V        Past Medical History:   Diagnosis Date    Diabetes mellitus ([address]     Hyperlipidemia     Hypertension     Thyroid disease        Past Surgical History:   Procedure Laterality Date    ANKLE SURGERY Left     JOINT REPLACEMENT Bilateral     knee replacements    KNEE SURGERY Right        Family History   Problem Relation Age of Onset    Arthritis Other     Diabetes Other         reports that he has never smoked. He has never used smokeless tobacco. He reports that he does not drink alcohol. Allergies:  Patient has no known allergies.     Current Medications:    apixaban (ELIQUIS) tablet 5 mg, BID  amiodarone (CORDARONE) 450 mg in dextrose 5 % 250 mL infusion, Continuous  levothyroxine (SYNTHROID) tablet 75 mcg, Daily  sodium chloride flush 0.9 % injection 5-40 mL, 2 times per day  sodium chloride flush 0.9 % injection 5-40 mL, PRN  0.9 % sodium chloride infusion, PRN  ondansetron (ZOFRAN-ODT) disintegrating tablet 4 mg, Q8H PRN   Or  ondansetron (ZOFRAN) injection 4 mg, Q6H PRN  polyethylene glycol (GLYCOLAX) packet 17 g, Daily PRN  acetaminophen (TYLENOL) tablet 650 mg, Q6H PRN   Or  acetaminophen (TYLENOL) suppository 650 mg, Q6H PRN  perflutren lipid microspheres (DEFINITY) injection 1.65 mg, ONCE PRN  furosemide (LASIX) 100 mg in dextrose 5 % 100 mL infusion, Continuous  traZODone (DESYREL) tablet 50 mg, Nightly  insulin lispro (1 Unit Dial) 0-6 Units, TID WC  insulin lispro (1 Unit Dial) 0-3 Units, Nightly  glucose (GLUTOSE) 40 % oral gel 15 g, PRN  dextrose 50 % IV solution, PRN  glucagon (rDNA) injection 1 mg, PRN  dextrose 5 % solution, PRN  oxyCODONE-acetaminophen (PERCOCET) 5-325 MG per tablet 1 tablet, Q4H PRN   Or  oxyCODONE-acetaminophen (PERCOCET) 5-325 MG per tablet 2 tablet, Q4H PRN  tamsulosin (FLOMAX) capsule 0.4 mg, Daily  pantoprazole (PROTONIX) tablet 40 mg, QAM AC        Review of Systems:   14 point ROS obtained but were negative except mentioned in HPI      Physical exam:     Vitals:  BP 96/60   Pulse 91   Temp 98.2 °F (36.8 °C) (Oral)   Resp 20   Ht 5' 10\" (1.778 m)   Wt (!) 332 lb 8 oz (150.8 kg)   SpO2 95%   BMI 47.71 kg/m²   Constitutional:  OAA X3 NAD  Skin: no rash, turgor wnl  Heent:  eomi, mmm  Neck: no bruits or jvd noted  Cardiovascular:  S1, S2 without m/r/g  Respiratory: CTA B without w/r/r  Abdomen:  +bs, soft, nt, nd  Ext: + lower extremity edema  Psychiatric: mood and affect appropriate  Musculoskeletal:  Rom, muscular strength intact    Data:   Labs:  CBC:   Recent Labs     07/14/21  1942 07/15/21  1355 07/16/21  0521   WBC 4.0 3.8* 5.1   HGB 11.6* 12.4* 12.4*    146 155     BMP:    Recent Labs     07/14/21  1942 07/15/21  0249 07/16/21  0521   * 136 139   K 5.5* 4.2 4.0    100 99   CO2 25 26 30   BUN 54* 49* 43*   CREATININE 1.6* 1.8* 2.0*   GLUCOSE 146* 140* 124*     Ca/Mg/Phos:   Recent Labs     07/14/21  1942 07/15/21  0249 07/15/21  0447 07/16/21  0521   CALCIUM 9.1 8.7  --  9.2   MG  --   --  2.20 2.00     Hepatic:   Recent Labs     07/14/21 1942   AST 29   ALT 11   BILITOT 0.5   ALKPHOS 61     Troponin:   Recent Labs     07/15/21  0126 07/15/21  0249 07/15/21  0959   TROPONINI 0.02* 0.03* 0.03*     BNP: No results for input(s): BNP in the last 72 hours. Lipids:   Recent Labs     07/16/21  0521   CHOL 127   TRIG 91   HDL 38*   LDLCALC 71   LABVLDL 18     ABGs: No results for input(s): PHART, PO2ART, NYK1VZO in the last 72 hours.   INR:   Recent Labs 07/15/21  1355   INR 1.02     UA:  Recent Labs     07/14/21  1942   COLORU Yellow   CLARITYU Clear   GLUCOSEU Negative   BILIRUBINUR Negative   KETUA Negative   SPECGRAV 1.020   BLOODU Negative   PHUR 5.0   PROTEINU Negative   UROBILINOGEN 0.2   NITRU Negative   LEUKOCYTESUR Negative   LABMICR Not Indicated   Moisésoria Miguelinion NotGiven      Urine Microscopic: No results for input(s): LABCAST, BACTERIA, COMU, HYALCAST, WBCUA, RBCUA, EPIU in the last 72 hours. Urine Culture: No results for input(s): LABURIN in the last 72 hours. Urine Chemistry: No results for input(s): Sharita Radha, PROTEINUR, NAUR in the last 72 hours. IMAGING:  VL Extremity Venous Bilateral         XR CHEST PORTABLE   Final Result      No acute disease. Assessment/Plan   1. ELLEN - sec to CRS     2. HTN    3. Anemia    4. Acid- base/ Electrolyte imbalance     5.  CHF     Plan   - IV diuresis   - Ur studies - no proteinuria   - Monitor UO and renal funciton   - strict I/O   - monitor lytes and replace as needed   - labs in am     Recommend to dose adjust all medications  based on renal functions  Maintain SBP> 90 mmHg   Daily weights   AVOID NSAIDs  Avoid Nephrotoxins  Monitor Intake/Output  Call if significant decrease in urine output           Thank you for allowing us to participate in care of Rambo Bowling MD  Feel free to contact me   [facility] [address]  Office : [phone]  Fax :[phone]

## 2021-07-17 NOTE — PROGRESS NOTES
Nephrology  Note                                                                                                                                                                                                                                                                                                                                                               Office : [phone]     Fax :[phone]              Patient's Name: Kieran Phalen Cr worse   Good UO   No N/V        Past Medical History:   Diagnosis Date    Diabetes mellitus ([address]     Hyperlipidemia     Hypertension     Thyroid disease        Past Surgical History:   Procedure Laterality Date    ANKLE SURGERY Left     JOINT REPLACEMENT Bilateral     knee replacements    KNEE SURGERY Right        Family History   Problem Relation Age of Onset    Arthritis Other     Diabetes Other         reports that he has never smoked. He has never used smokeless tobacco. He reports that he does not drink alcohol. Allergies:  Patient has no known allergies.     Current Medications:    furosemide (LASIX) tablet 40 mg, BID  apixaban (ELIQUIS) tablet 5 mg, BID  amiodarone (CORDARONE) 450 mg in dextrose 5 % 250 mL infusion, Continuous  levothyroxine (SYNTHROID) tablet 75 mcg, Daily  sodium chloride flush 0.9 % injection 5-40 mL, 2 times per day  sodium chloride flush 0.9 % injection 5-40 mL, PRN  0.9 % sodium chloride infusion, PRN  ondansetron (ZOFRAN-ODT) disintegrating tablet 4 mg, Q8H PRN   Or  ondansetron (ZOFRAN) injection 4 mg, Q6H PRN  polyethylene glycol (GLYCOLAX) packet 17 g, Daily PRN  acetaminophen (TYLENOL) tablet 650 mg, Q6H PRN   Or  acetaminophen (TYLENOL) suppository 650 mg, Q6H PRN  perflutren lipid microspheres (DEFINITY) injection 1.65 mg, ONCE PRN  traZODone (DESYREL) tablet 50 mg, Nightly  insulin lispro (1 Unit Dial) 0-6 Units, TID WC  insulin lispro (1 Unit Dial) 0-3 Units, Nightly  glucose (GLUTOSE) 40 % oral gel 15 g, PRN  dextrose 50 % IV solution, PRN  glucagon (rDNA) injection 1 mg, PRN  dextrose 5 % solution, PRN  oxyCODONE-acetaminophen (PERCOCET) 5-325 MG per tablet 1 tablet, Q4H PRN   Or  oxyCODONE-acetaminophen (PERCOCET) 5-325 MG per tablet 2 tablet, Q4H PRN  tamsulosin (FLOMAX) capsule 0.4 mg, Daily  pantoprazole (PROTONIX) tablet 40 mg, QAM AC        Review of Systems:   14 point ROS obtained but were negative except mentioned in HPI      Physical exam:     Vitals:  /70   Pulse 90   Temp 98.2 °F (36.8 °C) (Oral)   Resp 18   Ht 5' 10\" (1.778 m)   Wt (!) 327 lb 2.6 oz (148.4 kg)   SpO2 95%   BMI 46.94 kg/m²   Constitutional:  OAA X3 NAD  Skin: no rash, turgor wnl  Heent:  eomi, mmm  Neck: no bruits or jvd noted  Cardiovascular:  S1, S2 without m/r/g  Respiratory: CTA B without w/r/r  Abdomen:  +bs, soft, nt, nd  Ext: + lower extremity edema  Psychiatric: mood and affect appropriate  Musculoskeletal:  Rom, muscular strength intact    Data:   Labs:  CBC:   Recent Labs     07/15/21  1355 07/16/21  0521 07/17/21 0443   WBC 3.8* 5.1 4.4   HGB 12.4* 12.4* 12.7*    155 153     BMP:    Recent Labs     07/15/21  0249 07/16/21 0521 07/17/21 0443    139 138   K 4.2 4.0 3.8    99 94*   CO2 26 30 30   BUN 49* 43* 45*   CREATININE 1.8* 2.0* 2.1*   GLUCOSE 140* 124* 112*     Ca/Mg/Phos:   Recent Labs     07/15/21  0249 07/15/21  0447 07/16/21  0521 07/17/21 0443   CALCIUM 8.7  --  9.2 9.1   MG  --  2.20 2.00 2.00     Hepatic:   Recent Labs     07/14/21  1942   AST 29   ALT 11   BILITOT 0.5   ALKPHOS 61     Troponin:   Recent Labs     07/15/21  0126 07/15/21  0249 07/15/21  0959   TROPONINI 0.02* 0.03* 0.03*     BNP: No results for input(s): BNP in the last 72 hours. Lipids:   Recent Labs     07/16/21  0521   CHOL 127   TRIG 91   HDL 38*   LDLCALC 71   LABVLDL 18     ABGs: No results for input(s): PHART, PO2ART, DMP3RUW in the last 72 hours.   INR:   Recent Labs     07/15/21  1355   INR 1.02 UA:  Recent Labs     07/14/21 1942   COLORU Yellow   CLARITYU Clear   GLUCOSEU Negative   BILIRUBINUR Negative   KETUA Negative   SPECGRAV 1.020   BLOODU Negative   PHUR 5.0   PROTEINU Negative   UROBILINOGEN 0.2   NITRU Negative   LEUKOCYTESUR Negative   LABMICR Not Indicated   Dean Adrian NotGiven      Urine Microscopic: No results for input(s): LABCAST, BACTERIA, COMU, HYALCAST, WBCUA, RBCUA, EPIU in the last 72 hours. Urine Culture: No results for input(s): LABURIN in the last 72 hours. Urine Chemistry: No results for input(s): Eugune John, PROTEINUR, NAUR in the last 72 hours. IMAGING:  VL Extremity Venous Bilateral         XR CHEST PORTABLE   Final Result      No acute disease. Assessment/Plan   1. ELLEN - sec to CRS     2. HTN    3. Anemia    4. Acid- base/ Electrolyte imbalance     5.  CHF     Plan   - change to oral lasix   - Ur studies - no proteinuria   - Monitor UO and renal funciton   - strict I/O   - monitor lytes and replace as needed   - labs in am     Recommend to dose adjust all medications  based on renal functions  Maintain SBP> 90 mmHg   Daily weights   AVOID NSAIDs  Avoid Nephrotoxins  Monitor Intake/Output  Call if significant decrease in urine output           Thank you for allowing us to participate in care of Valeriy Simms MD  Feel free to contact me   [facility] [address]  Office : [phone]  Fax :[phone]

## 2021-07-17 NOTE — PROGRESS NOTES
Progress Note    Admit Date: 7/14/2021  Day: 4  Diet: ADULT DIET; Regular; Low Sodium (2 gm); 1500 ml    CC: SOB    Interval history: Pt reports LE swelling has resolved, back to baseline. Denies CP, laying flat on bed when I was talking to him. Denies SOB, palpitations, lightheadedness, abd fullness. Denies F/C, cough. Medications:     Scheduled Meds:   furosemide  40 mg Oral BID    apixaban  5 mg Oral BID    levothyroxine  75 mcg Oral Daily    sodium chloride flush  5-40 mL Intravenous 2 times per day    traZODone  50 mg Oral Nightly    insulin lispro  0-6 Units Subcutaneous TID WC    insulin lispro  0-3 Units Subcutaneous Nightly    tamsulosin  0.4 mg Oral Daily    pantoprazole  40 mg Oral QAM AC     Continuous Infusions:   amiodarone 0.5 mg/min (07/17/21 0701)    sodium chloride      dextrose       PRN Meds:sodium chloride flush, sodium chloride, ondansetron **OR** ondansetron, polyethylene glycol, acetaminophen **OR** acetaminophen, perflutren lipid microspheres, glucose, dextrose, glucagon (rDNA), dextrose, oxyCODONE-acetaminophen **OR** oxyCODONE-acetaminophen    Objective:   Vitals:   T-max:  Patient Vitals for the past 8 hrs:   BP Temp Temp src Pulse Resp SpO2   07/17/21 1149 108/70 98.1 °F (36.7 °C) Oral 95 18 95 %   07/17/21 0856 100/70 98.2 °F (36.8 °C) Oral 90 18 95 %       Intake/Output Summary (Last 24 hours) at 7/17/2021 1351  Last data filed at 7/17/2021 1147  Gross per 24 hour   Intake 1867.9 ml   Output 3800 ml   Net -1932.1 ml       Review of Systems  - as above  Physical Exam  Constitutional:       General: He is not in acute distress. Appearance: He is not ill-appearing. Cardiovascular:      Rate and Rhythm: Normal rate and regular rhythm. Heart sounds: No murmur heard. No friction rub. No gallop. Pulmonary:      Effort: Pulmonary effort is normal. No respiratory distress. Breath sounds: Normal breath sounds. No wheezing, rhonchi or rales.    Abdominal: General: There is distension (obese). Palpations: Abdomen is soft. Tenderness: There is no abdominal tenderness. There is no guarding or rebound. Musculoskeletal:      Right lower leg: No edema. Left lower leg: No edema. LABS:    CBC:   Recent Labs     07/15/21  1355 07/16/21  0521 07/17/21  0443   WBC 3.8* 5.1 4.4   HGB 12.4* 12.4* 12.7*   HCT 36.3* 35.9* 36.0*    155 153   .3* 101.8* 101.8*     Renal:    Recent Labs     07/15/21  0249 07/15/21  0447 07/16/21  0521 07/17/21  0443     --  139 138   K 4.2  --  4.0 3.8     --  99 94*   CO2 26  --  30 30   BUN 49*  --  43* 45*   CREATININE 1.8*  --  2.0* 2.1*   GLUCOSE 140*  --  124* 112*   CALCIUM 8.7  --  9.2 9.1   MG  --  2.20 2.00 2.00   ANIONGAP 10  --  10 14     Hepatic:   Recent Labs     07/14/21  1942   AST 29   ALT 11   BILITOT 0.5   PROT 6.8   LABALBU 4.0   ALKPHOS 61     Troponin:   Recent Labs     07/15/21  0126 07/15/21  0249 07/15/21  0959   TROPONINI 0.02* 0.03* 0.03*     BNP: No results for input(s): BNP in the last 72 hours. Lipids:   Recent Labs     07/16/21  0521   CHOL 127   HDL 38*     ABGs:  No results for input(s): PHART, GUO6BVD, PO2ART, TYZ9MON, BEART, THGBART, T6FNFWRC, LWY6HVM in the last 72 hours. INR:   Recent Labs     07/15/21  1355   INR 1.02     Lactate: No results for input(s): LACTATE in the last 72 hours. Cultures:  -----------------------------------------------------------------  RAD:   VL Extremity Venous Bilateral         XR CHEST PORTABLE   Final Result      No acute disease. Assessment/Plan:   67 M PMH HTN, CKD 3, T2DM pw SOB, admitted for heart failure exacerbation, ECHO with normal EF and no wall motion abnormalities. He is currently hemodynamically stable but with soft BP, in A fib with occasional RVR, afebrile.  Taken off IV diuresis and change to PO this am.    Acute on chronic diastolic heart failure 2/2 inadequate PO diuresis - was taking PRN lasix at home  Increased SOB and LE edema on admission, JVD difficult to assess but weight was up 20 lbs from baseline of 330 lbs in 5/21. ECHO normal EF no wall motion abnormalities  Breathing now at baseline and LE edema resolved, Cr baseline 1.36 in 4/21, pw 1.6 and now up to 2.1 with diuresis and BP on softer side  - Will stop lasix gtt, will transition to PO lasix 40 mg BID  - Daily weights, strict I's & O's  - Low salt diet  - Cardiology following    Atrial fibrillation with RVR  New onset in setting of heart failure also.  [id number] 4  HR uncontrolled at times though controlled previously, likely from volume depletion due to diuresis, but remains on amio gtt  - Will continue amio gtt today as he occasionally flips into RVR, as we are switching to po diuresis hoping aggressive volume removal will not be an issue - then will plan to discontinue tomorrow and place on toprol XL  - Will continue eliquis 5 mg BID    ELLEN on CKD 3b from acute exacerbation of heart failure and decreased renal perfusion  Cr baseline 1.36, now up tp 2.1. UO >4L in last 24 hours and net negative 10L  - Change to PO lasix as above  - Monitor Cr and UO closely  - Nephrology following    T2DM, well controlled  - Continue SSI, POCT glucose checks, hypoglycemia protocol      Code Status: Full code  FEN: Low salt diet  PPX: Eliquis  DISPO: GMF, likely discharge in next 1-2 days pending HR control with po meds    Kathleen Trimble MD, PGY-3  07/17/21  1:51 PM    This patient has been staffed and discussed with Rangel Begum MD.

## 2021-07-18 LAB
ANION GAP SERPL CALCULATED.3IONS-SCNC: 14 MMOL/L (ref 3–16)
BASOPHILS ABSOLUTE: 0 K/UL (ref 0–0.2)
BASOPHILS RELATIVE PERCENT: 0.5 %
BUN BLDV-MCNC: 48 MG/DL (ref 7–20)
CALCIUM SERPL-MCNC: 9.2 MG/DL (ref 8.3–10.6)
CHLORIDE BLD-SCNC: 95 MMOL/L (ref 99–110)
CO2: 31 MMOL/L (ref 21–32)
CREAT SERPL-MCNC: 2.2 MG/DL (ref 0.8–1.3)
EOSINOPHILS ABSOLUTE: 0.1 K/UL (ref 0–0.6)
EOSINOPHILS RELATIVE PERCENT: 2.5 %
GFR AFRICAN AMERICAN: 35
GFR NON-AFRICAN AMERICAN: 29
GLUCOSE BLD-MCNC: 115 MG/DL (ref 70–99)
GLUCOSE BLD-MCNC: 129 MG/DL (ref 70–99)
GLUCOSE BLD-MCNC: 131 MG/DL (ref 70–99)
GLUCOSE BLD-MCNC: 140 MG/DL (ref 70–99)
GLUCOSE BLD-MCNC: 153 MG/DL (ref 70–99)
HCT VFR BLD CALC: 37 % (ref 40.5–52.5)
HEMOGLOBIN: 12.8 G/DL (ref 13.5–17.5)
LYMPHOCYTES ABSOLUTE: 1.4 K/UL (ref 1–5.1)
LYMPHOCYTES RELATIVE PERCENT: 30.7 %
MAGNESIUM: 2 MG/DL (ref 1.8–2.4)
MCH RBC QN AUTO: 35.5 PG (ref 26–34)
MCHC RBC AUTO-ENTMCNC: 34.7 G/DL (ref 31–36)
MCV RBC AUTO: 102.2 FL (ref 80–100)
MONOCYTES ABSOLUTE: 0.7 K/UL (ref 0–1.3)
MONOCYTES RELATIVE PERCENT: 14.7 %
NEUTROPHILS ABSOLUTE: 2.4 K/UL (ref 1.7–7.7)
NEUTROPHILS RELATIVE PERCENT: 51.6 %
PDW BLD-RTO: 12.8 % (ref 12.4–15.4)
PERFORMED ON: ABNORMAL
PLATELET # BLD: 160 K/UL (ref 135–450)
PMV BLD AUTO: 8.3 FL (ref 5–10.5)
POTASSIUM SERPL-SCNC: 3.8 MMOL/L (ref 3.5–5.1)
PRO-BNP: 1456 PG/ML (ref 0–449)
RBC # BLD: 3.62 M/UL (ref 4.2–5.9)
SODIUM BLD-SCNC: 140 MMOL/L (ref 136–145)
WBC # BLD: 4.6 K/UL (ref 4–11)

## 2021-07-18 PROCEDURE — 6370000000 HC RX 637 (ALT 250 FOR IP): Performed by: STUDENT IN AN ORGANIZED HEALTH CARE EDUCATION/TRAINING PROGRAM

## 2021-07-18 PROCEDURE — 97162 PT EVAL MOD COMPLEX 30 MIN: CPT

## 2021-07-18 PROCEDURE — 97165 OT EVAL LOW COMPLEX 30 MIN: CPT

## 2021-07-18 PROCEDURE — 85025 COMPLETE CBC W/AUTO DIFF WBC: CPT

## 2021-07-18 PROCEDURE — 6370000000 HC RX 637 (ALT 250 FOR IP): Performed by: INTERNAL MEDICINE

## 2021-07-18 PROCEDURE — 97535 SELF CARE MNGMENT TRAINING: CPT

## 2021-07-18 PROCEDURE — 83880 ASSAY OF NATRIURETIC PEPTIDE: CPT

## 2021-07-18 PROCEDURE — 83735 ASSAY OF MAGNESIUM: CPT

## 2021-07-18 PROCEDURE — 97116 GAIT TRAINING THERAPY: CPT

## 2021-07-18 PROCEDURE — 2580000003 HC RX 258: Performed by: STUDENT IN AN ORGANIZED HEALTH CARE EDUCATION/TRAINING PROGRAM

## 2021-07-18 PROCEDURE — 36415 COLL VENOUS BLD VENIPUNCTURE: CPT

## 2021-07-18 PROCEDURE — 2060000000 HC ICU INTERMEDIATE R&B

## 2021-07-18 PROCEDURE — 6360000002 HC RX W HCPCS: Performed by: INTERNAL MEDICINE

## 2021-07-18 PROCEDURE — 97530 THERAPEUTIC ACTIVITIES: CPT

## 2021-07-18 PROCEDURE — 99233 SBSQ HOSP IP/OBS HIGH 50: CPT | Performed by: INTERNAL MEDICINE

## 2021-07-18 PROCEDURE — 6370000000 HC RX 637 (ALT 250 FOR IP)

## 2021-07-18 PROCEDURE — 2580000003 HC RX 258: Performed by: INTERNAL MEDICINE

## 2021-07-18 PROCEDURE — 80048 BASIC METABOLIC PNL TOTAL CA: CPT

## 2021-07-18 RX ORDER — AMIODARONE HYDROCHLORIDE 200 MG/1
200 TABLET ORAL 2 TIMES DAILY
Status: DISCONTINUED | OUTPATIENT
Start: 2021-07-18 | End: 2021-07-19 | Stop reason: HOSPADM

## 2021-07-18 RX ADMIN — POLYETHYLENE GLYCOL (3350) 17 G: 17 POWDER, FOR SOLUTION ORAL at 12:45

## 2021-07-18 RX ADMIN — INSULIN LISPRO 1 UNITS: 100 INJECTION, SOLUTION INTRAVENOUS; SUBCUTANEOUS at 22:54

## 2021-07-18 RX ADMIN — Medication 10 ML: at 09:05

## 2021-07-18 RX ADMIN — APIXABAN 5 MG: 5 TABLET, FILM COATED ORAL at 22:53

## 2021-07-18 RX ADMIN — AMIODARONE HYDROCHLORIDE 200 MG: 200 TABLET ORAL at 22:52

## 2021-07-18 RX ADMIN — INSULIN LISPRO 1 UNITS: 100 INJECTION, SOLUTION INTRAVENOUS; SUBCUTANEOUS at 12:46

## 2021-07-18 RX ADMIN — LEVOTHYROXINE SODIUM 75 MCG: 0.07 TABLET ORAL at 04:41

## 2021-07-18 RX ADMIN — TAMSULOSIN HYDROCHLORIDE 0.4 MG: 0.4 CAPSULE ORAL at 09:03

## 2021-07-18 RX ADMIN — AMIODARONE HYDROCHLORIDE 200 MG: 200 TABLET ORAL at 09:04

## 2021-07-18 RX ADMIN — METOPROLOL TARTRATE 12.5 MG: 25 TABLET, FILM COATED ORAL at 22:52

## 2021-07-18 RX ADMIN — Medication 10 ML: at 22:52

## 2021-07-18 RX ADMIN — AMIODARONE HYDROCHLORIDE 0.5 MG/MIN: 50 INJECTION, SOLUTION INTRAVENOUS at 04:43

## 2021-07-18 RX ADMIN — FUROSEMIDE 40 MG: 40 TABLET ORAL at 09:04

## 2021-07-18 RX ADMIN — APIXABAN 5 MG: 5 TABLET, FILM COATED ORAL at 09:04

## 2021-07-18 RX ADMIN — OXYCODONE HYDROCHLORIDE AND ACETAMINOPHEN 2 TABLET: 5; 325 TABLET ORAL at 21:49

## 2021-07-18 RX ADMIN — OXYCODONE HYDROCHLORIDE AND ACETAMINOPHEN 2 TABLET: 5; 325 TABLET ORAL at 09:05

## 2021-07-18 RX ADMIN — PANTOPRAZOLE SODIUM 40 MG: 40 TABLET, DELAYED RELEASE ORAL at 04:41

## 2021-07-18 RX ADMIN — TRAZODONE HYDROCHLORIDE 50 MG: 50 TABLET ORAL at 22:52

## 2021-07-18 RX ADMIN — METOPROLOL TARTRATE 12.5 MG: 25 TABLET, FILM COATED ORAL at 09:04

## 2021-07-18 ASSESSMENT — PAIN - FUNCTIONAL ASSESSMENT: PAIN_FUNCTIONAL_ASSESSMENT: ACTIVITIES ARE NOT PREVENTED

## 2021-07-18 ASSESSMENT — PAIN SCALES - WONG BAKER
WONGBAKER_NUMERICALRESPONSE: 0

## 2021-07-18 ASSESSMENT — PAIN DESCRIPTION - LOCATION: LOCATION: BACK

## 2021-07-18 ASSESSMENT — PAIN SCALES - GENERAL
PAINLEVEL_OUTOF10: 0
PAINLEVEL_OUTOF10: 8
PAINLEVEL_OUTOF10: 8
PAINLEVEL_OUTOF10: 0
PAINLEVEL_OUTOF10: 0
PAINLEVEL_OUTOF10: 8
PAINLEVEL_OUTOF10: 0
PAINLEVEL_OUTOF10: 6
PAINLEVEL_OUTOF10: 4
PAINLEVEL_OUTOF10: 0

## 2021-07-18 ASSESSMENT — PAIN DESCRIPTION - PROGRESSION: CLINICAL_PROGRESSION: NOT CHANGED

## 2021-07-18 ASSESSMENT — PAIN DESCRIPTION - PAIN TYPE: TYPE: CHRONIC PAIN

## 2021-07-18 ASSESSMENT — PAIN DESCRIPTION - DESCRIPTORS: DESCRIPTORS: ACHING

## 2021-07-18 ASSESSMENT — PAIN DESCRIPTION - ORIENTATION: ORIENTATION: LOWER

## 2021-07-18 ASSESSMENT — PAIN DESCRIPTION - ONSET: ONSET: ON-GOING

## 2021-07-18 ASSESSMENT — PAIN DESCRIPTION - FREQUENCY: FREQUENCY: CONTINUOUS

## 2021-07-18 NOTE — PROGRESS NOTES
[facility] Progress Note      Admit Date:  7/14/2021    Subjective:  Mr. Garcia was seen and examined. F/U AFib/CHF. Breathing  at baseline. No chest pain. No palp.      Objective:   /68   Pulse 91   Temp 98.2 °F (36.8 °C) (Oral)   Resp 18   Ht 5' 10\" (1.778 m)   Wt (!) 325 lb 6.4 oz (147.6 kg)   SpO2 94%   BMI 46.69 kg/m²       Intake/Output Summary (Last 24 hours) at 7/18/2021 6809  Last data filed at 7/18/2021 0405  Gross per 24 hour   Intake 654.59 ml   Output 2225 ml   Net -1570.41 ml       TELEMETRY: Atrial fibrillation     Physical Exam:  General:  Awake, alert, NAD  Skin:  Warm and dry  Neck:  JVP difficult  Chest:  Decreased BS, respiration normal  Cardiovascular:  Irreg/irreg S1S2  Abdomen:  Soft nontender  Extremities:  no edema    Medications:    furosemide  40 mg Oral BID    apixaban  5 mg Oral BID    levothyroxine  75 mcg Oral Daily    sodium chloride flush  5-40 mL Intravenous 2 times per day    traZODone  50 mg Oral Nightly    insulin lispro  0-6 Units Subcutaneous TID WC    insulin lispro  0-3 Units Subcutaneous Nightly    tamsulosin  0.4 mg Oral Daily    pantoprazole  40 mg Oral QAM AC      amiodarone 0.5 mg/min (07/18/21 0443)    sodium chloride      dextrose       sodium chloride flush, sodium chloride, ondansetron **OR** ondansetron, polyethylene glycol, acetaminophen **OR** acetaminophen, perflutren lipid microspheres, glucose, dextrose, glucagon (rDNA), dextrose, oxyCODONE-acetaminophen **OR** oxyCODONE-acetaminophen    Lab Data:  CBC:   Recent Labs     07/16/21 0521 07/17/21 0443 07/18/21  0511   WBC 5.1 4.4 4.6   HGB 12.4* 12.7* 12.8*   HCT 35.9* 36.0* 37.0*   .8* 101.8* 102.2*    153 160     BMP:   Recent Labs     07/16/21 0521 07/17/21 0443 07/18/21  0511    138 140   K 4.0 3.8 3.8   CL 99 94* 95*   CO2 30 30 31   BUN 43* 45* 48*   CREATININE 2.0* 2.1* 2.2*     LIVER PROFILE:   No results for input(s): AST, ALT, LIPASE, BILIDIR, BILITOT, ALKPHOS in the last 72 hours. Invalid input(s): AMYLASE,  ALB  PT/INR:   Recent Labs     07/15/21  1355   PROTIME 11.5   INR 1.02     APTT:   Recent Labs     07/15/21  1355   APTT 37.4     BNP:  No results for input(s): BNP in the last 72 hours. IMAGING:     Assessment:  Patient Active Problem List    Diagnosis Date Noted    ELLEN ([facility]) ([facility]     Congestive heart failure (HCC)     Elevated troponin     Persistent atrial fibrillation (HCC)     Atrial fibrillation with RVR ([facility] 07/14/2021       Plan:  1. Good diuresis. Breathing at baseline. On RA. Cr 2.1. Now on oral lasix  Tachy at times. BP soft. Switch to po amio. Low dose metoprolol for HR. On Eliquis.        Core Measures:  · Discharge instructions:   · LVEF documented:   · ACEI for LV dysfunction:   · Smoking Cessation:    Samanta Upton MD, MD 7/18/2021 6:35 AM

## 2021-07-18 NOTE — PLAN OF CARE
Problem: Falls - Risk of:  Goal: Will remain free from falls  Description: Will remain free from falls  Outcome: Ongoing   See Charla Moeller Fall Risk Score. Pt bed in low position and side rails up. Call light and belongings in reach. Pt encouraged to call for assistance. Pt steady gait w/crutch to walk with, tolerated walking in halls this shift with supervision. Problem: Pain:  Goal: Pain level will decrease  Description: Pain level will decrease  Outcome: Ongoing   Monitoring pain trend, implementing non-pharm interventions and also admins PRN pain medication. Will continue to monitor and maintain Pts goal for pain relief.

## 2021-07-18 NOTE — PROGRESS NOTES
Progress Note    Admit Date: 7/14/2021  Day: 4  Diet: ADULT DIET; Regular;  Low Sodium (2 gm); 1500 ml    Interval history: ***      Medications:     Scheduled Meds:   amiodarone  200 mg Oral BID    metoprolol tartrate  12.5 mg Oral BID    furosemide  40 mg Oral BID    apixaban  5 mg Oral BID    levothyroxine  75 mcg Oral Daily    sodium chloride flush  5-40 mL Intravenous 2 times per day    traZODone  50 mg Oral Nightly    insulin lispro  0-6 Units Subcutaneous TID WC    insulin lispro  0-3 Units Subcutaneous Nightly    tamsulosin  0.4 mg Oral Daily    pantoprazole  40 mg Oral QAM AC     Continuous Infusions:   sodium chloride      dextrose       PRN Meds:sodium chloride flush, sodium chloride, ondansetron **OR** ondansetron, polyethylene glycol, acetaminophen **OR** acetaminophen, perflutren lipid microspheres, glucose, dextrose, glucagon (rDNA), dextrose, oxyCODONE-acetaminophen **OR** oxyCODONE-acetaminophen    Objective:   Vitals:   T-max:  Patient Vitals for the past 8 hrs:   BP Temp Temp src Pulse Resp SpO2 Weight   07/18/21 0405 101/68 98.2 °F (36.8 °C) Oral 91 18 94 % (!) 325 lb 6.4 oz (147.6 kg)   07/17/21 2345 99/63 97.9 °F (36.6 °C) Oral 117 18 95 %        Intake/Output Summary (Last 24 hours) at 7/18/2021 0740  Last data filed at 7/18/2021 0405  Gross per 24 hour   Intake 654.59 ml   Output 1900 ml   Net -1245.41 ml       Physical Exam      LABS:    CBC:   Recent Labs     07/16/21  0521 07/17/21  0443 07/18/21  0511   WBC 5.1 4.4 4.6   HGB 12.4* 12.7* 12.8*   HCT 35.9* 36.0* 37.0*    153 160   .8* 101.8* 102.2*     Renal:    Recent Labs     07/16/21  0521 07/17/21  0443 07/18/21  0511    138 140   K 4.0 3.8 3.8   CL 99 94* 95*   CO2 30 30 31   BUN 43* 45* 48*   CREATININE 2.0* 2.1* 2.2*   GLUCOSE 124* 112* 131*   CALCIUM 9.2 9.1 9.2   MG 2.00 2.00 2.00   ANIONGAP 10 14 14     Hepatic: No results for input(s): AST, ALT, BILITOT, BILIDIR, PROT, LABALBU, ALKPHOS in the last 72 hours. Troponin:   Recent Labs     07/15/21  0959   TROPONINI 0.03*     BNP: No results for input(s): BNP in the last 72 hours. Lipids:   Recent Labs     07/16/21  0521   CHOL 127   HDL 38*     ABGs:  No results for input(s): PHART, PZB9BUZ, PO2ART, VAR7KJM, BEART, THGBART, X5BLVMKG, QUH5MIC in the last 72 hours. INR:   Recent Labs     07/15/21  1355   INR 1.02     Lactate: No results for input(s): LACTATE in the last 72 hours. Cultures:  -----------------------------------------------------------------  RAD:   VL Extremity Venous Bilateral         XR CHEST PORTABLE   Final Result      No acute disease.                         Assessment/Plan:   everardo Simpson, MS-4  07/18/21  7:40 AM    This patient has been staffed and discussed with Alcira Montes MD.

## 2021-07-18 NOTE — PROGRESS NOTES
Progress Note    Admit Date: 7/14/2021  Day: 4  Diet: ADULT DIET; Regular; Low Sodium (2 gm); 1500 ml    CC: Shortness of breath     Interval history:  No acute events overnight. Patient seen and examined at [facility]. .  Patient denies shortness of breath, chest pain, chills, nausea, vomiting He denies urinary frequency, dysuria. Patient is hemodynamically stable and afebrile He remains on eliquis. Medications:     Scheduled Meds:   amiodarone  200 mg Oral BID    metoprolol tartrate  12.5 mg Oral BID    furosemide  40 mg Oral BID    apixaban  5 mg Oral BID    levothyroxine  75 mcg Oral Daily    sodium chloride flush  5-40 mL Intravenous 2 times per day    traZODone  50 mg Oral Nightly    insulin lispro  0-6 Units Subcutaneous TID WC    insulin lispro  0-3 Units Subcutaneous Nightly    tamsulosin  0.4 mg Oral Daily    pantoprazole  40 mg Oral QAM AC     Continuous Infusions:   sodium chloride      dextrose       PRN Meds:sodium chloride flush, sodium chloride, ondansetron **OR** ondansetron, polyethylene glycol, acetaminophen **OR** acetaminophen, perflutren lipid microspheres, glucose, dextrose, glucagon (rDNA), dextrose, oxyCODONE-acetaminophen **OR** oxyCODONE-acetaminophen    Objective:   Vitals:   T-max:  Patient Vitals for the past 8 hrs:   BP Temp Temp src Pulse Resp SpO2 Weight   07/18/21 0819 (!) 156/78 -- -- 114 20 96 % --   07/18/21 0405 101/68 98.2 °F (36.8 °C) Oral 91 18 94 % (!) 325 lb 6.4 oz (147.6 kg)       Intake/Output Summary (Last 24 hours) at 7/18/2021 0915  Last data filed at 7/18/2021 0405  Gross per 24 hour   Intake 654.59 ml   Output 1650 ml   Net -995.41 ml       ROS: A 10 point review of systems was conducted, significant findings as noted in HPI. Physical Exam  Constitutional:       Appearance: Normal appearance. He is obese. HENT:      Mouth/Throat:      Mouth: Mucous membranes are moist.   Eyes:      Extraocular Movements: Extraocular movements intact. Conjunctiva/sclera: Conjunctivae normal.      Pupils: Pupils are equal, round, and reactive to light. Cardiovascular:      Rate and Rhythm: Normal rate and regular rhythm. Pulses: Normal pulses. Heart sounds: Normal heart sounds. Pulmonary:      Effort: Pulmonary effort is normal.      Breath sounds: Normal breath sounds. Abdominal:      General: Abdomen is flat. There is distension. Palpations: Abdomen is soft. Musculoskeletal:         General: Normal range of motion. Cervical back: Normal range of motion. Neurological:      General: No focal deficit present. Mental Status: He is alert and oriented to person, place, and time. Psychiatric:         Mood and Affect: Mood normal.         Behavior: Behavior normal.           LABS:    CBC:   Recent Labs     07/16/21 0521 07/17/21 0443 07/18/21  0511   WBC 5.1 4.4 4.6   HGB 12.4* 12.7* 12.8*   HCT 35.9* 36.0* 37.0*    153 160   .8* 101.8* 102.2*     Renal:    Recent Labs     07/16/21  0521 07/17/21 0443 07/18/21  0511    138 140   K 4.0 3.8 3.8   CL 99 94* 95*   CO2 30 30 31   BUN 43* 45* 48*   CREATININE 2.0* 2.1* 2.2*   GLUCOSE 124* 112* 131*   CALCIUM 9.2 9.1 9.2   MG 2.00 2.00 2.00   ANIONGAP 10 14 14     Hepatic: No results for input(s): AST, ALT, BILITOT, BILIDIR, PROT, LABALBU, ALKPHOS in the last 72 hours. Troponin:   Recent Labs     07/15/21  0959   TROPONINI 0.03*     BNP: No results for input(s): BNP in the last 72 hours. Lipids:   Recent Labs     07/16/21  0521   CHOL 127   HDL 38*     ABGs:  No results for input(s): PHART, VMY0DWF, PO2ART, DHX8RCJ, BEART, THGBART, U2KLIYTF, EDV5HGI in the last 72 hours. INR:   Recent Labs     07/15/21  1355   INR 1.02     Lactate: No results for input(s): LACTATE in the last 72 hours. Cultures:  -----------------------------------------------------------------  RAD:   VL Extremity Venous Bilateral         XR CHEST PORTABLE   Final Result      No acute disease. Assessment/Plan:   Johan Sanchez a 67 y. o. male with a PMH of HTN, T2DM, BPH, and obesity. He  presents to the ED with SOB for 1  day. He was admitted for work-up and management of  Acute heart failure decompensation. He is currently hemodynamically stable and afebrile.     Acute on chronic decompensated diastolic heart failure 2/2 inadequate PO diuresis   Denies shortness of breath, edema resolved. Baseline weight 325. Troponin elevated due to demand ischemia Echo normal EF, no wall motion abnormalities.  -Continue PO Lasix 40 mg BID  -Daily weights, strict I's & O's  - Low salt diet   - F/u cardiology recs   - Will send outpatient heart failure clinic      Atrial fibrillation with RVR  New onset in the setting of  HF.  on admission, 120-10s now. Received diltiazem, became hypotensive. Diltiazem discontinued, started on amiodarone. CHADSVASc: 4.  -Continue amiodarone  mg BID  -Continue metoprolol 12.5 BID will monitor tele if afib RVR will increase dose  -Continue eliquis 5 mg     ELLEN on CKD 3b from acute exacerbation of heart failure and decreased renal perfusion  Creatinine baseline 1.36 in 4/21 pw 1.6 and now up to 2.2    -Continue PO Lasix 40 mg BID  -F/u Cr and UO closely  - F/u nephrology     T2DM  A1C 6 (2/21)  -follow blood glucose  -low dose sliding scale     BPH  PSA 4.13 (4/21).   -Continue FLOMAX        Code Status: Full code  FEN: Low salt diet  PPX: Eliquis  DISPO: GMF, dc pending Afib response to  PO amiodarone    likely discharge in next 1-2 days pending HR control with po meds    Franco Mohan MD, PGY-1  07/18/21  9:15 AM    This patient has been staffed and discussed with Marely Prather MD.

## 2021-07-18 NOTE — PROGRESS NOTES
Physical Therapy    Facility/Department: [place] [address] PCU  Initial Assessment and Discharge    NAME: Summer Hi  : 1945  MRN: [id number]    Date of Service: 2021    Discharge Recommendations:  Summer Hi scored a 22/24 on the AM-PAC short mobility form. At this time, no further PT is recommended upon discharge. Recommend patient returns to prior setting with prior services. PT Equipment Recommendations  Equipment Needed: No    Assessment   Assessment: Pt from home with Afib with RVR. Pt doing well from PT standpoint, demonstrating transfers and gait at functional baseline. Pt in agreement and voices no concerns returning home with assist from girlfriend. Pt edcuated on importance of daily ambulation from RN staff during remainder of stay. Pt verbalized understanding. Will sign off from PT services. Decision Making: Medium Complexity  Patient Education: Role of PT and importance of ongoing activity/ambulation with RN staff for hospital stay. Pt verbalized understanding. REQUIRES PT FOLLOW UP: No         Restrictions  Up as tolerated     Vision/Hearing  Vision: Within Functional Limits  Hearing: Within functional limits       Subjective  Chart Reviewed: Yes  Additional Pertinent Hx: Pt to ED  with shortness of beath and fluid build up. Pt admitted for Afib with RVR. CXR (-). LE dopplers (-) for DVT BLE.  PMH:  DM, HTN, thyroid disease  Diagnosis: Afib with RVR    Subjective  Subjective: Pt found supine in bed. Pt agreeable for PT. \"It's amazing I could do some of this without my pain meds. Usually mornings aren't my best time. \"  Pain Screening  Patient Currently in Pain: Yes (8/10 chronic back pain, RN is aware)    Orientation  Within Functional Limits    Social/Functional History  Lives With: Significant other (temporarily staying with him)  Type of Home: [facility]: Two level (has a daybed on first floor; half bath on this level (staying on main floor); shower is on lower level)  Home Access: Stairs to enter with rails  Entrance Stairs - Number of Steps: 5  Bathroom Shower/Tub: Shower chair without back, Walk-in shower  Bathroom Toilet: Handicap height  Bathroom Equipment: Grab bars in shower, Grab bars around toilet  Bathroom Accessibility: Accessible  Home Equipment: Crutches, Rolling walker, Grab bars  ADL Assistance: Independent (assist prn - for dressing when girlfriend there; able to get dressed)  Homemaking Assistance: Needs assistance (girlfriend - laundry and cleaning; pt cooks (sits on chair prn))  Ambulation Assistance: Independent (using crutch recently)  Transfer Assistance: Independent  Active : Yes  Occupation: Retired  Additional Comments: TKR this year; denies recent falls; does have chronic back pain - needing to use a crutch 2* back pain; uses scooter in stores      Objective  Strength  Strength RLE: WFL  Strength LLE: WFL    Bed mobility  Supine to Sit: Modified independent (bed flat, use of rail)     Transfers  Sit to Stand: Modified independent (using crutch)  Stand to sit: Modified independent     Ambulation  Device: Axillary Crutches (single crutch on L side)  Assistance: Stand by assistance  Quality of Gait: leans hevily on crutch for support, reaches out with R hand for balance at times  Gait Deviations: Decreased step length;Decreased step height  Distance: 50ft    Balance  Sitting - Static: Good  Sitting - Dynamic: Good  Standing - Static: Good  Standing - Dynamic: Good (using crutch for mobility/ambulation)    Treatment included gait and transfer training with patient education     Plan: Discharge acute PT      Safety Devices  Type of devices: Left in chair, Chair alarm in place, Call light within reach, Nurse notified      AM-PAC Score  AM-PAC Inpatient Mobility Raw Score : 22 (07/18/21 0929)  AM-PAC Inpatient T-Scale Score : 53.28 (07/18/21 0929)  Mobility Inpatient CMS 0-100% Score: 20.91 (07/18/21 0929)  Mobility Inpatient CMS G-Code Modifier : CJ (07/18/21 4323)      Therapy Time   Individual Concurrent Group Co-treatment   Time In 0810         Time Out 0848         Minutes 38         Timed Code Treatment Minutes:  25  Total Treatment Minutes:  [facility] 21, PT

## 2021-07-18 NOTE — PROGRESS NOTES
Occupational Therapy   Occupational Therapy Initial Assessment and Treatment  Discharge    Date: 2021   Patient Name: Masoud Reynolds  MRN: [id number]     : 1945    Date of Service: 2021    Discharge Recommendations:  Masoud Reynolds scored a 22/24 on the AM-PAC ADL Inpatient form. At this time, no further OT is recommended upon discharge due to baseline status. Recommend patient returns to prior setting with prior services. OT Equipment Recommendations  Mobility Devices: ADL Assistive Devices  ADL Assistive Devices: Reacher;Sock-Aid Soft;Long-handled Shoe Horn;Long-handled Sponge;Elastic Shoe Laces; Emergency Alert System (discussed the below needs: pt receptive to suggestions)    Assessment   Assessment: Pt appears to be functioning near his baseline w/ regards to ADLs and mobility. Pt has chronic back pain and bilateral knee pain (baseline) - ambulates w/ single crutch. Plans to return home w/ initial 24 hr A of significant other. No DME needs - has needed DME in place. Did discuss possible use of reacher, sock aide, LH shoe horn, and elastic shoelaces to decrease pain w/ LB ADLs (although pt able to perform ADLs w/o AE). No acute OT needs. Discussed recommendation to be OOB and to ambulate w/ staff during admission - stated understanding/agreement. Will sign off. Decision Making: Low Complexity  OT Education: OT Role;Plan of Care;ADL Adaptive Strategies  REQUIRES OT FOLLOW UP: No  Activity Tolerance  Activity Tolerance: Patient Tolerated treatment well  Activity Tolerance: but fatigued on exertion; mostly limited by chronic pain  Safety Devices  Safety Devices in place: Yes  Type of devices: Nurse notified; Left in chair;Chair alarm in place;Call light within reach           Patient Diagnosis(es): The primary encounter diagnosis was Persistent atrial fibrillation ([facility] [address]  Diagnoses of Elevated troponin and Congestive heart failure, unspecified HF chronicity, unspecified heart failure type have chronic back pain - needing to use a crutch 2* back pain; uses scooter in stores       Objective   Vision: Within Functional Limits  Hearing: Within functional limits      Orientation  Overall Orientation Status: Within Normal Limits        Balance  Sitting Balance: Independent (static; supervision  - LB dressing)  Standing Balance: Supervision    Functional Mobility  Functional - Mobility Device:  (single crutch)  Activity: Other (mobility in room/hallway)  Assist Level: Stand by assistance    Toilet Transfers  Toilet - Technique: Ambulating  Equipment Used: Extra wide bedside commode (simulated w/ bedside chair)  Toilet Transfer: Supervision    ADL  LE Dressing: Supervision; Increased time to complete;Setup  Toileting: Modified independent  (using urinal)     Coordination  Movements Are Fluid And Coordinated: Yes        Bed mobility  Supine to Sit: Modified independent (bed flat, use of rail)  Scooting: Independent     Transfers  Sit to stand: Supervision  Stand to sit: Supervision        Cognition  Overall Cognitive Status: WNL  Cognition Comment: appears to have a good awareness of his abilites and limitations                    LUE AROM (degrees)  LUE AROM : WFL  RUE AROM (degrees)  RUE AROM : WFL  LUE Strength  Gross LUE Strength: WFL  RUE Strength  Gross RUE Strength: WFL               Pt seen by OT for eval and treat. Treatment included: bed mobility, functional transfer/mobility, ADL, pt education         Plan   Discharge acute OT - no needs. Pt at baseline.                                                       AM-PAC Score        AM-PAC Inpatient Daily Activity Raw Score: 22 (07/18/21 0942)  AM-PAC Inpatient ADL T-Scale Score : 47.1 (07/18/21 0942)  ADL Inpatient CMS 0-100% Score: 25.8 (07/18/21 0942)  ADL Inpatient CMS G-Code Modifier : CJ (07/18/21 5284)              Therapy Time   Individual Concurrent Group Co-treatment   Time In 0810         Time Out 0848         Minutes 38           Timed Code Treatment Minutes:   23    Total Treatment Minutes:  [facility] OTR/L #[id number]

## 2021-07-18 NOTE — PROGRESS NOTES
Nephrology  Note                                                                                                                                                                                                                                                                                                                                                               Office : [phone]     Fax :[phone]              Patient's Name: Gonzales Martell    Cr worse   Good UO   No N/V        Past Medical History:   Diagnosis Date    Diabetes mellitus ([address]     Hyperlipidemia     Hypertension     Thyroid disease        Past Surgical History:   Procedure Laterality Date    ANKLE SURGERY Left     JOINT REPLACEMENT Bilateral     knee replacements    KNEE SURGERY Right        Family History   Problem Relation Age of Onset    Arthritis Other     Diabetes Other         reports that he has never smoked. He has never used smokeless tobacco. He reports that he does not drink alcohol. Allergies:  Patient has no known allergies.     Current Medications:    amiodarone (CORDARONE) tablet 200 mg, BID  metoprolol tartrate (LOPRESSOR) tablet 12.5 mg, BID  apixaban (ELIQUIS) tablet 5 mg, BID  levothyroxine (SYNTHROID) tablet 75 mcg, Daily  sodium chloride flush 0.9 % injection 5-40 mL, 2 times per day  sodium chloride flush 0.9 % injection 5-40 mL, PRN  0.9 % sodium chloride infusion, PRN  ondansetron (ZOFRAN-ODT) disintegrating tablet 4 mg, Q8H PRN   Or  ondansetron (ZOFRAN) injection 4 mg, Q6H PRN  polyethylene glycol (GLYCOLAX) packet 17 g, Daily PRN  acetaminophen (TYLENOL) tablet 650 mg, Q6H PRN   Or  acetaminophen (TYLENOL) suppository 650 mg, Q6H PRN  perflutren lipid microspheres (DEFINITY) injection 1.65 mg, ONCE PRN  traZODone (DESYREL) tablet 50 mg, Nightly  insulin lispro (1 Unit Dial) 0-6 Units, TID WC  insulin lispro (1 Unit Dial) 0-3 Units, Nightly  glucose (GLUTOSE) 40 % oral gel 15 g, PRN  dextrose 50 % IV solution, PRN  glucagon (rDNA) injection 1 mg, PRN  dextrose 5 % solution, PRN  oxyCODONE-acetaminophen (PERCOCET) 5-325 MG per tablet 1 tablet, Q4H PRN   Or  oxyCODONE-acetaminophen (PERCOCET) 5-325 MG per tablet 2 tablet, Q4H PRN  tamsulosin (FLOMAX) capsule 0.4 mg, Daily  pantoprazole (PROTONIX) tablet 40 mg, QAM AC        Review of Systems:   14 point ROS obtained but were negative except mentioned in HPI      Physical exam:     Vitals:  /82   Pulse 99   Temp 98.8 °F (37.1 °C) (Oral)   Resp 18   Ht 5' 10\" (1.778 m)   Wt (!) 325 lb 6.4 oz (147.6 kg)   SpO2 95%   BMI 46.69 kg/m²   Constitutional:  OAA X3 NAD  Skin: no rash, turgor wnl  Heent:  eomi, mmm  Neck: no bruits or jvd noted  Cardiovascular:  S1, S2 without m/r/g  Respiratory: CTA B without w/r/r  Abdomen:  +bs, soft, nt, nd  Ext: + lower extremity edema  Psychiatric: mood and affect appropriate  Musculoskeletal:  Rom, muscular strength intact    Data:   Labs:  CBC:   Recent Labs     07/16/21 0521 07/17/21 0443 07/18/21  0511   WBC 5.1 4.4 4.6   HGB 12.4* 12.7* 12.8*    153 160     BMP:    Recent Labs     07/16/21 0521 07/17/21 0443 07/18/21  0511    138 140   K 4.0 3.8 3.8   CL 99 94* 95*   CO2 30 30 31   BUN 43* 45* 48*   CREATININE 2.0* 2.1* 2.2*   GLUCOSE 124* 112* 131*     Ca/Mg/Phos:   Recent Labs     07/16/21 0521 07/17/21 0443 07/18/21  0511   CALCIUM 9.2 9.1 9.2   MG 2.00 2.00 2.00     Hepatic:   No results for input(s): AST, ALT, ALB, BILITOT, ALKPHOS in the last 72 hours. Troponin:   No results for input(s): TROPONINI in the last 72 hours. BNP: No results for input(s): BNP in the last 72 hours. Lipids:   Recent Labs     07/16/21  0521   CHOL 127   TRIG 91   HDL 38*   LDLCALC 71   LABVLDL 18     ABGs: No results for input(s): PHART, PO2ART, YCR2LPX in the last 72 hours. INR:   No results for input(s): INR in the last 72 hours.   UA:  No results for input(s): Patricio Mosher GLUCOSEU, [address] [place] [address], Kaley Castle, Skyler Pj, UROBILINOGEN, NITRU, LEUKOCYTESUR, Jorden Payne in the last 72 hours. Urine Microscopic: No results for input(s): LABCAST, BACTERIA, COMU, HYALCAST, WBCUA, RBCUA, EPIU in the last 72 hours. Urine Culture: No results for input(s): LABURIN in the last 72 hours. Urine Chemistry: No results for input(s): Kalina Haring, PROTEINUR, NAUR in the last 72 hours. IMAGING:  VL Extremity Venous Bilateral         XR CHEST PORTABLE   Final Result      No acute disease. Assessment/Plan   1. ELLEN - sec to CRS     2. HTN    3. Anemia    4. Acid- base/ Electrolyte imbalance     5.  CHF     Plan   -hold lasix today as Cr worse   - Ur studies - no proteinuria   - Monitor UO and renal funciton   - strict I/O   - monitor lytes and replace as needed   - labs in am     Recommend to dose adjust all medications  based on renal functions  Maintain SBP> 90 mmHg   Daily weights   AVOID NSAIDs  Avoid Nephrotoxins  Monitor Intake/Output  Call if significant decrease in urine output           Thank you for allowing us to participate in care of Shyla Archer MD  Feel free to contact me   [facility] [address]  Office : [phone]  Fax :[phone]

## 2021-07-18 NOTE — PLAN OF CARE
balance  Outcome: Met This Shift     Problem: FLUID AND ELECTROLYTE IMBALANCE  Goal: Fluid and electrolyte balance are achieved/maintained  Outcome: Ongoing     Problem: ACTIVITY INTOLERANCE/IMPAIRED MOBILITY  Goal: Mobility/activity is maintained at optimum level for patient  Outcome: Ongoing

## 2021-07-19 VITALS
BODY MASS INDEX: 45.1 KG/M2 | TEMPERATURE: 98 F | OXYGEN SATURATION: 96 % | HEART RATE: 91 BPM | SYSTOLIC BLOOD PRESSURE: 105 MMHG | HEIGHT: 70 IN | RESPIRATION RATE: 18 BRPM | DIASTOLIC BLOOD PRESSURE: 69 MMHG | WEIGHT: 315 LBS

## 2021-07-19 LAB
ANION GAP SERPL CALCULATED.3IONS-SCNC: 10 MMOL/L (ref 3–16)
BASOPHILS ABSOLUTE: 0 K/UL (ref 0–0.2)
BASOPHILS RELATIVE PERCENT: 0.9 %
BUN BLDV-MCNC: 48 MG/DL (ref 7–20)
CALCIUM SERPL-MCNC: 9.2 MG/DL (ref 8.3–10.6)
CHLORIDE BLD-SCNC: 96 MMOL/L (ref 99–110)
CO2: 30 MMOL/L (ref 21–32)
CREAT SERPL-MCNC: 1.7 MG/DL (ref 0.8–1.3)
EOSINOPHILS ABSOLUTE: 0.2 K/UL (ref 0–0.6)
EOSINOPHILS RELATIVE PERCENT: 4.2 %
GFR AFRICAN AMERICAN: 48
GFR NON-AFRICAN AMERICAN: 39
GLUCOSE BLD-MCNC: 126 MG/DL (ref 70–99)
GLUCOSE BLD-MCNC: 130 MG/DL (ref 70–99)
GLUCOSE BLD-MCNC: 165 MG/DL (ref 70–99)
HCT VFR BLD CALC: 35.9 % (ref 40.5–52.5)
HEMOGLOBIN: 12.7 G/DL (ref 13.5–17.5)
LYMPHOCYTES ABSOLUTE: 1.3 K/UL (ref 1–5.1)
LYMPHOCYTES RELATIVE PERCENT: 35.1 %
MAGNESIUM: 2.1 MG/DL (ref 1.8–2.4)
MCH RBC QN AUTO: 35.7 PG (ref 26–34)
MCHC RBC AUTO-ENTMCNC: 35.4 G/DL (ref 31–36)
MCV RBC AUTO: 100.9 FL (ref 80–100)
MONOCYTES ABSOLUTE: 0.6 K/UL (ref 0–1.3)
MONOCYTES RELATIVE PERCENT: 15.2 %
NEUTROPHILS ABSOLUTE: 1.7 K/UL (ref 1.7–7.7)
NEUTROPHILS RELATIVE PERCENT: 44.6 %
PDW BLD-RTO: 13.2 % (ref 12.4–15.4)
PERFORMED ON: ABNORMAL
PERFORMED ON: ABNORMAL
PLATELET # BLD: 180 K/UL (ref 135–450)
PMV BLD AUTO: 8.8 FL (ref 5–10.5)
POTASSIUM SERPL-SCNC: 4.5 MMOL/L (ref 3.5–5.1)
RBC # BLD: 3.56 M/UL (ref 4.2–5.9)
SODIUM BLD-SCNC: 136 MMOL/L (ref 136–145)
WBC # BLD: 3.8 K/UL (ref 4–11)

## 2021-07-19 PROCEDURE — 6370000000 HC RX 637 (ALT 250 FOR IP): Performed by: INTERNAL MEDICINE

## 2021-07-19 PROCEDURE — 6370000000 HC RX 637 (ALT 250 FOR IP)

## 2021-07-19 PROCEDURE — 6370000000 HC RX 637 (ALT 250 FOR IP): Performed by: STUDENT IN AN ORGANIZED HEALTH CARE EDUCATION/TRAINING PROGRAM

## 2021-07-19 PROCEDURE — 99233 SBSQ HOSP IP/OBS HIGH 50: CPT | Performed by: INTERNAL MEDICINE

## 2021-07-19 PROCEDURE — 80048 BASIC METABOLIC PNL TOTAL CA: CPT

## 2021-07-19 PROCEDURE — 85025 COMPLETE CBC W/AUTO DIFF WBC: CPT

## 2021-07-19 PROCEDURE — 2580000003 HC RX 258: Performed by: STUDENT IN AN ORGANIZED HEALTH CARE EDUCATION/TRAINING PROGRAM

## 2021-07-19 PROCEDURE — 83735 ASSAY OF MAGNESIUM: CPT

## 2021-07-19 PROCEDURE — 36415 COLL VENOUS BLD VENIPUNCTURE: CPT

## 2021-07-19 RX ORDER — FUROSEMIDE 40 MG/1
40 TABLET ORAL DAILY
Qty: 60 TABLET | Refills: 3 | Status: SHIPPED | OUTPATIENT
Start: 2021-07-19

## 2021-07-19 RX ORDER — AMIODARONE HYDROCHLORIDE 200 MG/1
200 TABLET ORAL 2 TIMES DAILY
Qty: 60 TABLET | Refills: 0 | Status: SHIPPED | OUTPATIENT
Start: 2021-07-19 | End: 2021-09-27 | Stop reason: SDUPTHER

## 2021-07-19 RX ORDER — TAMSULOSIN HYDROCHLORIDE 0.4 MG/1
0.4 CAPSULE ORAL DAILY
Qty: 30 CAPSULE | Refills: 3 | Status: SHIPPED | OUTPATIENT
Start: 2021-07-20 | End: 2021-09-24

## 2021-07-19 RX ORDER — PANTOPRAZOLE SODIUM 40 MG/1
40 TABLET, DELAYED RELEASE ORAL
Qty: 30 TABLET | Refills: 3 | Status: SHIPPED | OUTPATIENT
Start: 2021-07-20 | End: 2021-09-24

## 2021-07-19 RX ORDER — FUROSEMIDE 40 MG/1
40 TABLET ORAL 2 TIMES DAILY
Status: DISCONTINUED | OUTPATIENT
Start: 2021-07-19 | End: 2021-07-19 | Stop reason: HOSPADM

## 2021-07-19 RX ADMIN — AMIODARONE HYDROCHLORIDE 200 MG: 200 TABLET ORAL at 09:03

## 2021-07-19 RX ADMIN — APIXABAN 5 MG: 5 TABLET, FILM COATED ORAL at 09:02

## 2021-07-19 RX ADMIN — TAMSULOSIN HYDROCHLORIDE 0.4 MG: 0.4 CAPSULE ORAL at 09:03

## 2021-07-19 RX ADMIN — OXYCODONE HYDROCHLORIDE AND ACETAMINOPHEN 2 TABLET: 5; 325 TABLET ORAL at 09:03

## 2021-07-19 RX ADMIN — PANTOPRAZOLE SODIUM 40 MG: 40 TABLET, DELAYED RELEASE ORAL at 04:53

## 2021-07-19 RX ADMIN — Medication 10 ML: at 09:04

## 2021-07-19 RX ADMIN — POLYETHYLENE GLYCOL (3350) 17 G: 17 POWDER, FOR SOLUTION ORAL at 13:58

## 2021-07-19 RX ADMIN — LEVOTHYROXINE SODIUM 75 MCG: 0.07 TABLET ORAL at 04:53

## 2021-07-19 RX ADMIN — METOPROLOL TARTRATE 12.5 MG: 25 TABLET, FILM COATED ORAL at 09:02

## 2021-07-19 RX ADMIN — INSULIN LISPRO 1 UNITS: 100 INJECTION, SOLUTION INTRAVENOUS; SUBCUTANEOUS at 12:46

## 2021-07-19 ASSESSMENT — PAIN SCALES - GENERAL
PAINLEVEL_OUTOF10: 4
PAINLEVEL_OUTOF10: 0
PAINLEVEL_OUTOF10: 0
PAINLEVEL_OUTOF10: 8
PAINLEVEL_OUTOF10: 5
PAINLEVEL_OUTOF10: 0
PAINLEVEL_OUTOF10: 0
PAINLEVEL_OUTOF10: 5

## 2021-07-19 ASSESSMENT — PAIN DESCRIPTION - ORIENTATION
ORIENTATION: LOWER

## 2021-07-19 ASSESSMENT — PAIN DESCRIPTION - LOCATION
LOCATION: BACK

## 2021-07-19 ASSESSMENT — PAIN SCALES - WONG BAKER
WONGBAKER_NUMERICALRESPONSE: 0

## 2021-07-19 ASSESSMENT — PAIN DESCRIPTION - DESCRIPTORS
DESCRIPTORS: ACHING

## 2021-07-19 NOTE — PROGRESS NOTES
Patient accidentally created skin tear to right scrotum, when urinating in urinal. Small amount of bleeding noted, area scabbed now.

## 2021-07-19 NOTE — CARE COORDINATION
Tammi    Patient aware and agreeable to services. [facility] with referral [phone]. Tammi will pull referral from Epic.     Annita Alva, MALININ  [facility]  [phone]

## 2021-07-19 NOTE — CARE COORDINATION
Case Management Assessment            Discharge Note                    Date / Time of Note: 7/19/2021 2:28 PM                  Discharge Note Completed by: Missy Ragland RN    Patient Name: Amanda Barbosa   YOB: 1945  Diagnosis: Atrial fibrillation with RVR [facility]) [I48.91]   Date / Time: 7/14/2021  7:22 PM    Current PCP: Fahad Frankel MD  Clinic patient: No    Hospitalization in the last 30 days: No    Advance Directives:  Code Status: Full Code  [place] DNR form completed and on chart: No    Financial:  Payor: Martha Ryan / Plan: Andra Greco PPO / Product Type: Medicare /      Pharmacy:    [facility] [address], [address] [place] [address]  Phone: [phone] Fax: [phone]      Assistance purchasing medications?: Potential Assistance Purchasing Medications: No  Assistance provided by Case Management: None at this time    Does patient want to participate in local refill/ meds to beds program?: Yes    Meds To Beds General Rules:  1. Can ONLY be done Monday- Friday between 8:30am-5pm  2. Prescription(s) must be in pharmacy by 3pm to be filled same day  3. Copy of patient's insurance/ prescription drug card and patient face sheet must be sent along with the prescription(s)  4. Cost of Rx cannot be added to hospital bill. If financial assistance is needed, please contact unit  or ;  or  CANNOT provide pharmacy voucher for patients co-pays  5.  Patients can then  the prescription on their way out of the hospital at discharge, or pharmacy can deliver to the bedside if staff is available. (payment due at time of pick-up or delivery - cash, check, or card accepted)     Able to afford home medications/ co-pay costs: Yes    ADLS:  Current PT AM-PAC Score: 22 /24  Current OT AM-PAC Score: 22 /24      DISCHARGE Disposition: Home with [address] [place] [address] Care: [place]     LOC at discharge: Not Applicable  THU Completed: No    Notification completed in HENS/PAS?:  Not Applicable    IMM Completed:   Yes, Case management has presented and reviewed IMM letter #2 to the patient and/or family/ POA. Patient and/or family/POA verbalized understanding of their medicare rights and appeal process if needed. Patient and/or family/POA has signed, initialed and placed today's date (07.19.2021) and time (1400) on IMM letter #2 on the the appropriate lines. Patient and/or family/POA, copy of letter offered and they are aware that this original copy of IMM letter #2 is available prior to discharge from the paper chart on the unit. Electronic documentation has been entered into epic for IMM letter #2 and original paper copy has been added to the paper chart at the nurses station. Transportation:  Transportation PLAN for discharge: family   Mode of Transport: Private Car  Reason for medical transport: Not Applicable  Name of [address] [place] [address],[address]: Not Applicable  Time of Transport:     Transport form completed: No, Not Indicated    Home Care:  [address] ordered at discharge: Yes  [address]: [address]   Phone: [phone]  Fax: [phone]  Orders faxed: Yes    Durable Medical Equipment:  DME Provider:   Equipment obtained during hospitalization:     Home Oxygen and Respiratory Equipment:  Oxygen needed at discharge?: No, Not [address]: Not Applicable  Portable tank available for discharge?: No, Not Indicated    Dialysis:  Dialysis patient: No    Dialysis Center:  Not Applicable    Hospice Services:  Location: Not Applicable  Agency: Not Applicable    Consents signed: No, Not Indicated    Referrals made at [facility] for outpatient continued care:  Not Applicable    Additional CM Notes:      The Plan for Transition of Care is related to the following treatment goals of Atrial fibrillation with RVR ([address] [I48.91]    The Patient and/or patient representative Antwan Corey and his family were provided with a choice of provider and agrees with the discharge plan Yes    Freedom of choice list was provided with basic dialogue that supports the patient's individualized plan of care/goals and shares the quality data associated with the providers.  Yes    Care Transitions patient: No    Pipe Louis RN  [facility] [employer]  Case Management Department  Ph: [phone]  Fax: [phone]

## 2021-07-19 NOTE — DISCHARGE SUMMARY
INTERNAL MEDICINE DEPARTMENT AT [address] [place] [address]  DISCHARGE SUMMARY    Patient ID: Summer Hi                                             Discharge Date: 7/19/2021   Patient's PCP: Fidencio Gonzalez MD                                          Discharge Physician: Rajiv Harris MD MD  Admit Date: 7/14/2021   Admitting Physician: Debra Molina DO    PROBLEMS DURING HOSPITALIZATION:  Present on Admission:   Atrial fibrillation with RVR ([facility]   ELLEN (acute kidney injury) ([facility] [facility]   Congestive heart failure (HCC)   Elevated troponin   Persistent atrial fibrillation ([facility] [facility]      DISCHARGE DIAGNOSES:  -Acute on chronic decompensated diastolic heart failure   -Atrial fibrillation with RVR  -CKD3b  -T2DM  -BPH      The following issues were addressed during hospitalization:    Soniya Arias a 67 y. o. male with a PMH of HTN, T2DM, BPH, and obesity. He  presents to the ED with SOB and bilateral CECI extremity for 1  day. He was admitted for work-up and management of  acute on chronic decompensated diastolic heart failure secondary to inadequate dose of his diuretic medication (furosemide prn). Patient had elevated troponin on admission with posterior stabilization possibly 2/2 to demand ischemia. Echo normal EF, no wall motion abnormalities. IV Lasix was started, with low salt diet and cardiology was consulted. The patient responded well to therapy with resolution of his SOB, CECI and overral fluid accumulation with return to baseline weight. Patient are going to be seen in heart failure clinic in outpatient setting.      On admission, patient also presented new onset atrial fibrillation with RVR (). He  Initially received diltiazem and became hypotensive. Diltiazem was discontinued and amiodarone IV was started with subsequent  De-escalation to amiodarone PO with metoprolol PO. His  RVR was resolved, today his HR is on the 90s. Patient has a CHADSVASs score of 4, eliquis was started for anticoagulation. Patient presented ELLEN on CKD from his acute exacerbation of heart failure and decreased renal perfusion. His creatinine baseline was 1.36, creatinine went up to 2.2. Patient respond well to his heart failure management mentioned above with stabilization of his creatinine levels, today Cr. 1.7. Patient had a BPH history and reported some decreased intensity of the stream when urinating which was successfully treated with Flomax.      On the day of discharge patient was alert, oriented,  hemodynamically stable, afebrile. Patient denied any exertional chest pain, dyspnea, palpitations, syncope, orthopnea, edema or paroxysmal nocturnal dyspnea. The patient denied abdominal or flank pain, anorexia, nausea or vomiting, dysphagia, change in bowel habits or black or bloody stools or weight loss. He was discharged to his home in stable condition.       Physical Exam:  /66   Pulse 90   Temp 98 °F (36.7 °C) (Oral)   Resp 20   Ht 5' 10\" (1.778 m)   Wt (!) 323 lb 12.8 oz (146.9 kg)   SpO2 96%   BMI 46.46 kg/m²       Consults: cardiology and nephrology  Significant Diagnostic Studies:  chest x-ray, ECHO of heart, VL extremity venous   Treatments: cardiac meds: metoprolol and amiodarone and anticoagulation: eliquis  Disposition: home  Discharged Condition: Stable  Follow Up: Primary Care Physician in one week    DISCHARGE MEDICATION:     Medication List      START taking these medications    amiodarone 200 MG tablet  Commonly known as: CORDARONE  Take 1 tablet by mouth 2 times daily     apixaban 5 MG Tabs tablet  Commonly known as: ELIQUIS  Take 1 tablet by mouth 2 times daily     metoprolol tartrate 25 MG tablet  Commonly known as: LOPRESSOR  Take 0.5 tablets by mouth 2 times daily     pantoprazole 40 MG tablet  Commonly known as: PROTONIX  Take 1 tablet by mouth every morning (before breakfast)  Start taking on: July 20, 2021     tamsulosin 0.4 MG capsule  Commonly known as: FLOMAX  Take 1 capsule by mouth daily  Start taking on: July 20, 2021        CHANGE how you take these medications    furosemide 40 MG tablet  Commonly known as: LASIX  Take 1 tablet by mouth daily  What changed: when to take this        CONTINUE taking these medications    ATORVASTATIN CALCIUM PO     NONFORMULARY     PERCOCET PO     pioglitazone 45 MG tablet  Commonly known as: ACTOS     SYNTHROID PO        STOP taking these medications    ibuprofen 800 MG tablet  Commonly known as: Advil     LOSARTAN POTASSIUM PO     MELOXICAM PO           Where to Get Your Medications      You can get these medications from any pharmacy    Bring a paper prescription for each of these medications  · amiodarone 200 MG tablet  · apixaban 5 MG Tabs tablet  · furosemide 40 MG tablet  · metoprolol tartrate 25 MG tablet  · pantoprazole 40 MG tablet  · tamsulosin 0.4 MG capsule       Activity: activity as tolerated  Diet: cardiac diet  Wound Care: none needed    Time Spent on discharge is more than 45 minutes    Signed:  Paige Gordon MD,  MD, PGY-1  7/19/2021  Patient seen and examined, labs and imaging reviewed, agree with assessment and plan as outlined above. patients condition continues to improve doing well, asked patient to monitor side effects of medications which i've discussed at length, recurrence of symptoms or new symptoms including but not limited to chest pain, shortness of breath, nausea, vomiting, fevers or chills and seek immediate medical attention or call 911. Greater than 30 minutes spent on case on day of discharge. Discussed risks of bleeding discussed at length need for sleep study as I believe untreated undiagnosed sleep apnea is playing a role with much of his current issues.       Leonela Ruvalcaba MD FACP

## 2021-07-19 NOTE — PLAN OF CARE
Problem: Falls - Risk of:  Goal: Will remain free from falls  Description: Will remain free from falls  7/19/2021 0248 by Saúl Mark RN  Outcome: Met This Shift  7/18/2021 1936 by Magno Turcios RN  Outcome: Ongoing  Goal: Absence of physical injury  Description: Absence of physical injury  7/19/2021 0248 by Saúl Mark RN  Outcome: Met This Shift  7/18/2021 1936 by Magno Turcios RN  Outcome: Ongoing     Problem: Pain:  Goal: Pain level will decrease  Description: Pain level will decrease  7/19/2021 0248 by Saúl Mark RN  Outcome: Met This Shift  7/18/2021 1936 by Magno Turcios RN  Outcome: Ongoing  Goal: Control of acute pain  Description: Control of acute pain  7/19/2021 0248 by Saúl Mark RN  Outcome: Met This Shift  7/18/2021 1936 by Magno Turcios RN  Outcome: Ongoing  Goal: Control of chronic pain  Description: Control of chronic pain  7/19/2021 0248 by Saúl Mark RN  Outcome: Met This Shift  7/18/2021 1936 by Magno Turcios RN  Outcome: Ongoing     Problem: Fluid Volume - Imbalance:  Goal: Absence of imbalanced fluid volume signs and symptoms  Description: Absence of imbalanced fluid volume signs and symptoms  7/19/2021 0248 by Saúl Mark RN  Outcome: Ongoing  7/18/2021 1936 by Magno Turcios RN  Outcome: Ongoing     Problem: Cardiac Output - Decreased:  Goal: Hemodynamic stability will improve  Description: Hemodynamic stability will improve  7/19/2021 0248 by Saúl Mark RN  Outcome: Ongoing  7/18/2021 1936 by Magno Turcios RN  Outcome: Ongoing     Problem: OXYGENATION/RESPIRATORY FUNCTION  Goal: Patient will maintain patent airway  7/19/2021 0248 by Saúl Mark RN  Outcome: Met This Shift  7/18/2021 1936 by Magno Turcios RN  Outcome: Ongoing  Goal: Patient will achieve/maintain normal respiratory rate/effort  Description: Respiratory rate and effort will be within normal limits for the patient  7/19/2021 0248 by Do Tong RN  Outcome: Met This Shift  7/18/2021 1936 by Trevor Fernandez RN  Outcome: Ongoing     Problem: HEMODYNAMIC STATUS  Goal: Patient has stable vital signs and fluid balance  7/19/2021 0248 by Do Tong RN  Outcome: Ongoing  7/18/2021 1936 by Trevor Fernandez RN  Outcome: Ongoing     Problem: FLUID AND ELECTROLYTE IMBALANCE  Goal: Fluid and electrolyte balance are achieved/maintained  7/19/2021 0248 by Do Tong RN  Outcome: Ongoing  7/18/2021 1936 by Trevor Fernandez RN  Outcome: Ongoing     Problem: ACTIVITY INTOLERANCE/IMPAIRED MOBILITY  Goal: Mobility/activity is maintained at optimum level for patient  7/19/2021 0248 by Do Tong RN  Outcome: Ongoing  7/18/2021 1936 by Trevor Fernandez RN  Outcome: Ongoing

## 2021-07-19 NOTE — PROGRESS NOTES
Nephrology  Note                                                                                                                                                                                                                                                                                                                                                               Office : [phone]     Fax :[phone]              Patient's Name: Delta Galeazzi Cr better off lasix   Good UO   No N/V        Past Medical History:   Diagnosis Date    Diabetes mellitus ([address]     Hyperlipidemia     Hypertension     Thyroid disease        Past Surgical History:   Procedure Laterality Date    ANKLE SURGERY Left     JOINT REPLACEMENT Bilateral     knee replacements    KNEE SURGERY Right        Family History   Problem Relation Age of Onset    Arthritis Other     Diabetes Other         reports that he has never smoked. He has never used smokeless tobacco. He reports that he does not drink alcohol. Allergies:  Patient has no known allergies.     Current Medications:    amiodarone (CORDARONE) tablet 200 mg, BID  metoprolol tartrate (LOPRESSOR) tablet 12.5 mg, BID  apixaban (ELIQUIS) tablet 5 mg, BID  levothyroxine (SYNTHROID) tablet 75 mcg, Daily  sodium chloride flush 0.9 % injection 5-40 mL, 2 times per day  sodium chloride flush 0.9 % injection 5-40 mL, PRN  0.9 % sodium chloride infusion, PRN  ondansetron (ZOFRAN-ODT) disintegrating tablet 4 mg, Q8H PRN   Or  ondansetron (ZOFRAN) injection 4 mg, Q6H PRN  polyethylene glycol (GLYCOLAX) packet 17 g, Daily PRN  acetaminophen (TYLENOL) tablet 650 mg, Q6H PRN   Or  acetaminophen (TYLENOL) suppository 650 mg, Q6H PRN  perflutren lipid microspheres (DEFINITY) injection 1.65 mg, ONCE PRN  traZODone (DESYREL) tablet 50 mg, Nightly  insulin lispro (1 Unit Dial) 0-6 Units, TID WC  insulin lispro (1 Unit Dial) 0-3 Units, Nightly  glucose (GLUTOSE) 40 % oral gel 15 g, PRN  dextrose 50 % IV solution, PRN  glucagon (rDNA) injection 1 mg, PRN  dextrose 5 % solution, PRN  oxyCODONE-acetaminophen (PERCOCET) 5-325 MG per tablet 1 tablet, Q4H PRN   Or  oxyCODONE-acetaminophen (PERCOCET) 5-325 MG per tablet 2 tablet, Q4H PRN  tamsulosin (FLOMAX) capsule 0.4 mg, Daily  pantoprazole (PROTONIX) tablet 40 mg, QAM AC        Review of Systems:   14 point ROS obtained but were negative except mentioned in HPI      Physical exam:     Vitals:  /79   Pulse 96   Temp 97.7 °F (36.5 °C) (Oral)   Resp 18   Ht 5' 10\" (1.778 m)   Wt (!) 323 lb 12.8 oz (146.9 kg)   SpO2 96%   BMI 46.46 kg/m²   Constitutional:  OAA X3 NAD  Skin: no rash, turgor wnl  Heent:  eomi, mmm  Neck: no bruits or jvd noted  Cardiovascular:  S1, S2 without m/r/g  Respiratory: CTA B without w/r/r  Abdomen:  +bs, soft, nt, nd  Ext: + lower extremity edema  Psychiatric: mood and affect appropriate  Musculoskeletal:  Rom, muscular strength intact    Data:   Labs:  CBC:   Recent Labs     07/17/21 0443 07/18/21  0511 07/19/21  0540   WBC 4.4 4.6 3.8*   HGB 12.7* 12.8* 12.7*    160 180     BMP:    Recent Labs     07/17/21 0443 07/18/21  0511 07/19/21  0540    140 136   K 3.8 3.8 4.5   CL 94* 95* 96*   CO2 30 31 30   BUN 45* 48* 48*   CREATININE 2.1* 2.2* 1.7*   GLUCOSE 112* 131* 126*     Ca/Mg/Phos:   Recent Labs     07/17/21 0443 07/18/21  0511 07/19/21  0540   CALCIUM 9.1 9.2 9.2   MG 2.00 2.00 2.10     Hepatic:   No results for input(s): AST, ALT, ALB, BILITOT, ALKPHOS in the last 72 hours. Troponin:   No results for input(s): TROPONINI in the last 72 hours. BNP: No results for input(s): BNP in the last 72 hours. Lipids:   No results for input(s): CHOL, TRIG, HDL, LDLCALC, LABVLDL in the last 72 hours. ABGs: No results for input(s): PHART, PO2ART, BAV2GKF in the last 72 hours. INR:   No results for input(s): INR in the last 72 hours.   UA:  No results for input(s): Ana Luisa Woodson, [facility], [facility], [address] [place] [address], Edi Loya, UROBILINOGEN, NITRU, LEUKOCYTESUR, Jamila Garcia in the last 72 hours. Urine Microscopic: No results for input(s): LABCAST, BACTERIA, COMU, HYALCAST, WBCUA, RBCUA, EPIU in the last 72 hours. Urine Culture: No results for input(s): LABURIN in the last 72 hours. Urine Chemistry: No results for input(s): Elise Salk, PROTEINUR, NAUR in the last 72 hours. IMAGING:  VL Extremity Venous Bilateral   Final Result      XR CHEST PORTABLE   Final Result      No acute disease. Assessment/Plan   1. ELLEN - sec to CRS     2. HTN    3. Anemia    4. Acid- base/ Electrolyte imbalance     5.  CHF     Plan   - restart lasix PO   - Ur studies - no proteinuria   - Monitor UO and renal funciton   - strict I/O   - monitor lytes and replace as needed   - labs in am     Recommend to dose adjust all medications  based on renal functions  Maintain SBP> 90 mmHg   Daily weights   AVOID NSAIDs  Avoid Nephrotoxins  Monitor Intake/Output  Call if significant decrease in urine output           Thank you for allowing us to participate in care of Dipak Patton MD  Feel free to contact me   [facility] [address]  Office : [phone]  Fax :[phone]

## 2021-07-19 NOTE — PLAN OF CARE
Problem: Falls - Risk of:  Goal: Will remain free from falls  Description: Will remain free from falls  7/19/2021 1547 by Fatimah Mayorga RN  Outcome: Completed  7/19/2021 0248 by Saúl Mark RN  Outcome: Met This Shift  Goal: Absence of physical injury  Description: Absence of physical injury  7/19/2021 1547 by Fatimah Mayorga RN  Outcome: Completed  7/19/2021 0248 by Saúl Mark RN  Outcome: Met This Shift     Problem: Pain:  Goal: Pain level will decrease  Description: Pain level will decrease  7/19/2021 1547 by Fatimah Mayorga RN  Outcome: Completed  7/19/2021 0248 by Saúl Mark RN  Outcome: Met This Shift  Goal: Control of acute pain  Description: Control of acute pain  7/19/2021 1547 by Fatimah Mayorga RN  Outcome: Completed  7/19/2021 0248 by Saúl Mark RN  Outcome: Met This Shift  Goal: Control of chronic pain  Description: Control of chronic pain  7/19/2021 1547 by Fatimah Mayorga RN  Outcome: Completed  7/19/2021 0248 by Saúl Mark RN  Outcome: Met This Shift     Problem: Fluid Volume - Imbalance:  Goal: Absence of imbalanced fluid volume signs and symptoms  Description: Absence of imbalanced fluid volume signs and symptoms  7/19/2021 1547 by Fatimah Mayorga RN  Outcome: Completed  7/19/2021 0248 by Saúl Mark RN  Outcome: Ongoing     Problem: Cardiac Output - Decreased:  Goal: Hemodynamic stability will improve  Description: Hemodynamic stability will improve  7/19/2021 1547 by Fatimah Mayorga RN  Outcome: Completed  7/19/2021 0248 by Saúl Mark RN  Outcome: Ongoing     Problem: OXYGENATION/RESPIRATORY FUNCTION  Goal: Patient will maintain patent airway  7/19/2021 1547 by Fatimah Mayorga RN  Outcome: Completed  7/19/2021 0248 by Saúl Mark RN  Outcome: Met This Shift  Goal: Patient will achieve/maintain normal respiratory rate/effort  Description: Respiratory rate and effort will be within normal limits for the patient  7/19/2021 1547 by Ahmet Parnell RN  Outcome: Completed  7/19/2021 0248 by Yumiko Vail RN  Outcome: Met This Shift  Problem: HEMODYNAMIC STATUS  Goal: Patient has stable vital signs and fluid balance  7/19/2021 1547 by Ahmet Parnell RN  Outcome: Completed  7/19/2021 0248 by Yumiko Vail RN  Outcome: Ongoing     Problem: FLUID AND ELECTROLYTE IMBALANCE  Goal: Fluid and electrolyte balance are achieved/maintained  7/19/2021 1547 by Ahmet Parnell RN  Outcome: Completed  7/19/2021 0248 by Yumiko Vail RN  Outcome: Ongoing     Problem: ACTIVITY INTOLERANCE/IMPAIRED MOBILITY  Goal: Mobility/activity is maintained at optimum level for patient  7/19/2021 1547 by Ahmet Parnell RN  Outcome: Completed  7/19/2021 0248 by Yumiko Vail RN  Outcome: Ongoing

## 2021-07-23 ENCOUNTER — OFFICE VISIT (OUTPATIENT)
Dept: CARDIOLOGY CLINIC | Age: 76
End: 2021-07-23
Payer: MEDICARE

## 2021-07-23 VITALS
HEART RATE: 70 BPM | HEIGHT: 71 IN | WEIGHT: 315 LBS | DIASTOLIC BLOOD PRESSURE: 70 MMHG | SYSTOLIC BLOOD PRESSURE: 114 MMHG | BODY MASS INDEX: 44.1 KG/M2

## 2021-07-23 DIAGNOSIS — I50.30 DIASTOLIC CONGESTIVE HEART FAILURE, UNSPECIFIED HF CHRONICITY (HCC): ICD-10-CM

## 2021-07-23 DIAGNOSIS — N17.9 AKI (ACUTE KIDNEY INJURY) (HCC): ICD-10-CM

## 2021-07-23 DIAGNOSIS — R06.02 SOB (SHORTNESS OF BREATH): Primary | ICD-10-CM

## 2021-07-23 DIAGNOSIS — N18.9 CHRONIC RENAL IMPAIRMENT, UNSPECIFIED CKD STAGE: ICD-10-CM

## 2021-07-23 DIAGNOSIS — G47.33 OSA (OBSTRUCTIVE SLEEP APNEA): ICD-10-CM

## 2021-07-23 PROCEDURE — 93000 ELECTROCARDIOGRAM COMPLETE: CPT | Performed by: NURSE PRACTITIONER

## 2021-07-23 PROCEDURE — 99214 OFFICE O/P EST MOD 30 MIN: CPT | Performed by: NURSE PRACTITIONER

## 2021-07-23 NOTE — PROGRESS NOTES
tablet by mouth every morning (before breakfast) 30 tablet 3    pioglitazone (ACTOS) 45 MG tablet TAKE 1 TABLET DAILY      oxyCODONE-Acetaminophen (PERCOCET PO) Take by mouth      ATORVASTATIN CALCIUM PO Take by mouth      NONFORMULARY       Levothyroxine Sodium (SYNTHROID PO) Take by mouth       No current facility-administered medications for this visit. REVIEW OF SYSTEMS:    CONSTITUTIONAL: No major weight gain or loss, night sweats, fever, fatigue, or weakness. HEENT: No new vision difficulties or ringing in the ears. RESPIRATORY: No new SOB, PND, orthopnea or cough. CARDIOVASCULAR: See HPI  GI: No N/V/D, constipation, or abdominal pain. No black/tarry stools  : No urinary urgency, incontinence, or hematuria. SKIN: No cyanosis or skin lesions. MUSCULOSKELETAL: No new muscle or joint pain. NEUROLOGICAL: No syncope or TIA-like symptoms. PSYCHIATRIC: No anxiety, pain, insomnia or depression        Objective:   PHYSICAL EXAM:        Vitals:    07/23/21 1137   BP: 114/70   Pulse: 70   Weight: (!) 321 lb (145.6 kg)   Height: 5' 11\" (1.803 m)      VITALS:  /70   Pulse 70   Ht 5' 11\" (1.803 m)   Wt (!) 321 lb (145.6 kg)   BMI 44.77 kg/m²   CONSTITUTIONAL: Cooperative, no apparent distress, and appears well nourished / developed  NEUROLOGIC:  Awake and orientated to person, place, and time. PSYCH: Calm affect. SKIN: Warm and dry. HEENT: Sclera non-icteric, normocephalic, neck supple. RESPIRATORY:  No increased work of breathing and clear to auscultation, no crackles or wheezing. CARDIOVASCULAR:  Regular rate and rhythm without murmur. Normal S1 and S2. No gallops or rubs. Normal PMI. No elevation of JVP. Normal carotid pulses with no bruits. GI:  Normal bowel sounds. Non-distended. Non-tender to palpation  EXT: No edema. No calf tenderness. Pulses are present bilaterally.     Wt Readings from Last 3 Encounters:   07/23/21 (!) 321 lb (145.6 kg)   07/19/21 (!) 323 lb 12.8 oz (146.9 kg) 05/14/21 (!) 330 lb (149.7 kg)      Pulse Readings from Last 3 Encounters:   07/23/21 70   07/19/21 91   05/14/21 94     BP Readings from Last 3 Encounters:   07/23/21 114/70   07/19/21 105/69   05/14/21 (!) 164/83        DATA:    Lab Results   Component Value Date    ALT 11 07/14/2021    AST 29 07/14/2021    ALKPHOS 61 07/14/2021    BILITOT 0.5 07/14/2021     Lab Results   Component Value Date    CREATININE 1.7 (H) 07/19/2021    BUN 48 (H) 07/19/2021     07/19/2021    K 4.5 07/19/2021    CL 96 (L) 07/19/2021    CO2 30 07/19/2021     Lab Results   Component Value Date    TSH 1.78 07/15/2021     Lab Results   Component Value Date    WBC 3.8 (L) 07/19/2021    HGB 12.7 (L) 07/19/2021    HCT 35.9 (L) 07/19/2021    .9 (H) 07/19/2021     07/19/2021     Lab Results   Component Value Date    TRIG 91 07/16/2021     Lab Results   Component Value Date    HDL 38 (L) 07/16/2021     Lab Results   Component Value Date    LDLCALC 71 07/16/2021     Lab Results   Component Value Date    LABVLDL 18 07/16/2021     radiology Review:  Pertinent images / reports were reviewed as a part of this visit and reveals the following:    Assessment:     recently found to be in afib and HF    fx hx premature CAD / pt has not had a stress test   [id number] Score for Atrial Fibrillation Stroke Risk 4    [facility]   states lost 30# in hosp  / Claude Call / sCr down to 1.7 on  7 /19/2021     TTE 7/14/2021   Normal left ventricle size and systolic function with an estimated ejection fraction of 55%. Mild concentric left ventricular hypertrophy. No regional wall motion abnormalities are seen. Indeterminate diastolic function. The right ventricle is enlarged with preserved systolic function. Mild mitral regurgitation and trivial aortic regurgitation. Trivial tricuspid regurgitation. DMT2  Managed per IM     HTN   wnl     HLD   LDL 71     obesity    Body mass index is 44.77 kg/m².   Discussed     CRI   sCr 1.7  Decreased  No NSAIDS / only tylenol     YOHANA  Needs sleep study     Plan:   SS wt up 2# since left hospital    states lost 30# in [address]  / Evelene Brass / sCr down to 1.7 on  7 /19/2021   EKG today afib rate 101   On amiodarone eliquis lopressor lasix    lexiscan   Sleep study   neph consult   Do blood work  to assess potassium / may need supplement   Fu in approx 2 weeks / consider DCCV if he remains in afib     Thank you for allowing us to participate in the care of Blanche Frances.     Koki PETITP [address]     Documentation of today's visit sent to PCP

## 2021-07-29 DIAGNOSIS — I50.30 DIASTOLIC CONGESTIVE HEART FAILURE, UNSPECIFIED HF CHRONICITY (HCC): ICD-10-CM

## 2021-07-29 DIAGNOSIS — G47.33 OSA (OBSTRUCTIVE SLEEP APNEA): ICD-10-CM

## 2021-07-29 DIAGNOSIS — R06.02 SOB (SHORTNESS OF BREATH): ICD-10-CM

## 2021-07-29 DIAGNOSIS — N17.9 AKI (ACUTE KIDNEY INJURY) (HCC): ICD-10-CM

## 2021-07-29 LAB
A/G RATIO: 1.8 (ref 1.1–2.2)
ALBUMIN SERPL-MCNC: 4.5 G/DL (ref 3.4–5)
ALP BLD-CCNC: 81 U/L (ref 40–129)
ALT SERPL-CCNC: 20 U/L (ref 10–40)
ANION GAP SERPL CALCULATED.3IONS-SCNC: 17 MMOL/L (ref 3–16)
AST SERPL-CCNC: 21 U/L (ref 15–37)
BILIRUB SERPL-MCNC: 0.8 MG/DL (ref 0–1)
BILIRUBIN DIRECT: <0.2 MG/DL (ref 0–0.3)
BILIRUBIN, INDIRECT: NORMAL MG/DL (ref 0–1)
BUN BLDV-MCNC: 33 MG/DL (ref 7–20)
CALCIUM SERPL-MCNC: 9.5 MG/DL (ref 8.3–10.6)
CHLORIDE BLD-SCNC: 99 MMOL/L (ref 99–110)
CHOLESTEROL, TOTAL: 139 MG/DL (ref 0–199)
CO2: 23 MMOL/L (ref 21–32)
CREAT SERPL-MCNC: 1.7 MG/DL (ref 0.8–1.3)
GFR AFRICAN AMERICAN: 48
GFR NON-AFRICAN AMERICAN: 39
GLOBULIN: 2.5 G/DL
GLUCOSE BLD-MCNC: 164 MG/DL (ref 70–99)
HCT VFR BLD CALC: 41.9 % (ref 40.5–52.5)
HDLC SERPL-MCNC: 37 MG/DL (ref 40–60)
HEMOGLOBIN: 14.2 G/DL (ref 13.5–17.5)
LDL CHOLESTEROL CALCULATED: 64 MG/DL
MAGNESIUM: 2.1 MG/DL (ref 1.8–2.4)
MCH RBC QN AUTO: 34.7 PG (ref 26–34)
MCHC RBC AUTO-ENTMCNC: 34 G/DL (ref 31–36)
MCV RBC AUTO: 102.2 FL (ref 80–100)
PDW BLD-RTO: 12.8 % (ref 12.4–15.4)
PLATELET # BLD: 210 K/UL (ref 135–450)
PMV BLD AUTO: 8.4 FL (ref 5–10.5)
POTASSIUM SERPL-SCNC: 4.7 MMOL/L (ref 3.5–5.1)
PRO-BNP: 1451 PG/ML (ref 0–449)
RBC # BLD: 4.1 M/UL (ref 4.2–5.9)
SODIUM BLD-SCNC: 139 MMOL/L (ref 136–145)
TOTAL PROTEIN: 7 G/DL (ref 6.4–8.2)
TRIGL SERPL-MCNC: 192 MG/DL (ref 0–150)
TSH REFLEX FT4: 3.3 UIU/ML (ref 0.27–4.2)
VITAMIN D 25-HYDROXY: 58.6 NG/ML
VLDLC SERPL CALC-MCNC: 38 MG/DL
WBC # BLD: 3.9 K/UL (ref 4–11)

## 2021-08-05 ENCOUNTER — TELEPHONE (OUTPATIENT)
Dept: CARDIOLOGY CLINIC | Age: 76
End: 2021-08-05

## 2021-08-05 NOTE — TELEPHONE ENCOUNTER
Judy Ruby with [facility] occupational therapy #  [phone]  Needing one more order to see pt next week.    Order for one week to effective  8-4-21  Please call peggy at [phone]

## 2021-08-25 ENCOUNTER — HOSPITAL ENCOUNTER (OUTPATIENT)
Dept: NON INVASIVE DIAGNOSTICS | Age: 76
Discharge: HOME | End: 2021-08-25
Payer: MEDICARE

## 2021-08-25 DIAGNOSIS — R06.02 SOB (SHORTNESS OF BREATH): ICD-10-CM

## 2021-08-25 LAB
LV EF: 48 %
LVEF MODALITY: NORMAL

## 2021-08-25 PROCEDURE — 2580000003 HC RX 258: Performed by: NURSE PRACTITIONER

## 2021-08-25 PROCEDURE — A9502 TC99M TETROFOSMIN: HCPCS | Performed by: NURSE PRACTITIONER

## 2021-08-25 PROCEDURE — 93017 CV STRESS TEST TRACING ONLY: CPT

## 2021-08-25 PROCEDURE — 3430000000 HC RX DIAGNOSTIC RADIOPHARMACEUTICAL: Performed by: NURSE PRACTITIONER

## 2021-08-25 PROCEDURE — 78452 HT MUSCLE IMAGE SPECT MULT: CPT

## 2021-08-25 PROCEDURE — 6360000002 HC RX W HCPCS: Performed by: NURSE PRACTITIONER

## 2021-08-25 RX ORDER — SODIUM CHLORIDE 0.9 % (FLUSH) 0.9 %
10 SYRINGE (ML) INJECTION PRN
Status: DISCONTINUED | OUTPATIENT
Start: 2021-08-25 | End: 2021-08-26 | Stop reason: HOSPADM

## 2021-08-25 RX ADMIN — Medication 10 ML: at 09:56

## 2021-08-25 RX ADMIN — TETROFOSMIN 30 MILLICURIE: 1.38 INJECTION, POWDER, LYOPHILIZED, FOR SOLUTION INTRAVENOUS at 09:56

## 2021-08-25 RX ADMIN — REGADENOSON 0.4 MG: 0.08 INJECTION, SOLUTION INTRAVENOUS at 09:55

## 2021-08-26 ENCOUNTER — HOSPITAL ENCOUNTER (OUTPATIENT)
Dept: NON INVASIVE DIAGNOSTICS | Age: 76
Discharge: HOME | End: 2021-08-26
Payer: MEDICARE

## 2021-08-26 PROCEDURE — A9502 TC99M TETROFOSMIN: HCPCS | Performed by: NURSE PRACTITIONER

## 2021-08-26 PROCEDURE — 3430000000 HC RX DIAGNOSTIC RADIOPHARMACEUTICAL: Performed by: NURSE PRACTITIONER

## 2021-08-26 RX ADMIN — TETROFOSMIN 30 MILLICURIE: 1.38 INJECTION, POWDER, LYOPHILIZED, FOR SOLUTION INTRAVENOUS at 07:56

## 2021-08-30 ENCOUNTER — OFFICE VISIT (OUTPATIENT)
Dept: CARDIOLOGY CLINIC | Age: 76
End: 2021-08-30
Payer: MEDICARE

## 2021-08-30 VITALS
BODY MASS INDEX: 44.42 KG/M2 | SYSTOLIC BLOOD PRESSURE: 124 MMHG | WEIGHT: 315 LBS | HEART RATE: 100 BPM | DIASTOLIC BLOOD PRESSURE: 80 MMHG

## 2021-08-30 DIAGNOSIS — I48.91 ATRIAL FIBRILLATION WITH RVR (HCC): ICD-10-CM

## 2021-08-30 DIAGNOSIS — I10 HTN (HYPERTENSION), BENIGN: Primary | ICD-10-CM

## 2021-08-30 DIAGNOSIS — E78.5 HYPERLIPIDEMIA, UNSPECIFIED HYPERLIPIDEMIA TYPE: ICD-10-CM

## 2021-08-30 PROCEDURE — 99214 OFFICE O/P EST MOD 30 MIN: CPT | Performed by: INTERNAL MEDICINE

## 2021-08-30 NOTE — PROGRESS NOTES
CC:  Follow up AF RVR     Subjective:  Mr. Karen Campos feels better but still has AF with RVR   Objective:   Vitals:    08/30/21 1459   BP: 124/80   Pulse: 100           Physical Exam:  General:  Awake, alert, NAD  Eyes:  EOMI PERRL anicteric  Skin:  Warm and dry. Neck:  JVP normal  Chest:  Diminshed. Rales. Cardiovascular:  Irregularly irregular tachy  Normal S1S2. No Murmur. No Rub. No Gallop. Abdomen:  Soft NT  + bs  Extremities:  ++ edema  Neuro:  CN II-XII intact. No focal deficits. No weakness. No lateralizing findings. Psych:  Normal thought and affect. MSK:  No Cyanosis nor Clubbing. Symmetrical strength upper and lower extremities           Diagnosis Orders   1. HTN (hypertension), benign     2. Hyperlipidemia, unspecified hyperlipidemia type     3. Atrial fibrillation with RVR ([facility]           Plan:  1. HF:  Continue diuresis with furosemide gtt. Improving. Acute diastolic HF with LVEF 47% on echo. 2. AF RVR:  Continue amiodarone at 200 bid. Pt is taking eliquis but its not listed on his outpt medication list  3. HF  Controlled presently  4. MPI:  No ischemia. Echo with no WMA.   5. CRI:         Jesica Thorne MD, MD 7/16/2021 5:17 PM

## 2021-09-13 ENCOUNTER — HOSPITAL ENCOUNTER (EMERGENCY)
Age: 76
Discharge: HOME | End: 2021-09-13
Attending: EMERGENCY MEDICINE
Payer: MEDICARE

## 2021-09-13 ENCOUNTER — APPOINTMENT (OUTPATIENT)
Dept: GENERAL RADIOLOGY | Age: 76
End: 2021-09-13
Payer: MEDICARE

## 2021-09-13 VITALS
RESPIRATION RATE: 16 BRPM | OXYGEN SATURATION: 96 % | DIASTOLIC BLOOD PRESSURE: 60 MMHG | WEIGHT: 315 LBS | HEIGHT: 71 IN | BODY MASS INDEX: 44.1 KG/M2 | TEMPERATURE: 98.1 F | SYSTOLIC BLOOD PRESSURE: 127 MMHG | HEART RATE: 98 BPM

## 2021-09-13 DIAGNOSIS — S90.31XA CONTUSION OF RIGHT FOOT, INITIAL ENCOUNTER: Primary | ICD-10-CM

## 2021-09-13 PROCEDURE — 99282 EMERGENCY DEPT VISIT SF MDM: CPT

## 2021-09-13 PROCEDURE — 73630 X-RAY EXAM OF FOOT: CPT

## 2021-09-13 ASSESSMENT — PAIN DESCRIPTION - LOCATION: LOCATION: FOOT

## 2021-09-13 ASSESSMENT — PAIN DESCRIPTION - ORIENTATION: ORIENTATION: RIGHT

## 2021-09-13 ASSESSMENT — PAIN DESCRIPTION - PAIN TYPE: TYPE: ACUTE PAIN

## 2021-09-13 ASSESSMENT — PAIN SCALES - GENERAL: PAINLEVEL_OUTOF10: 8

## 2021-09-13 NOTE — ED PROVIDER NOTES
eMERGENCY dEPARTMENT eNCOUnter      PtName: Milady Ahmadi  MRN: [id number]  [place] 1945  Date of evaluation: 9/13/2021  Provider: Harmony Adler, [address] [place] [address]       Chief Complaint   Patient presents with    Foot Pain         HISTORY OF PRESENT ILLNESS   (Location/Symptom, Timing/Onset,Context/Setting, Quality, Duration, Modifying Factors, Severity) Note limiting factors. Milady Ahmadi is a 67 y.o. male who presents to the emergency department with chief complaint of right foot pain. Happened Friday night. Knee gave out and he fell. Denies hitting his head. Pain is moderate, achy, worse with movement better at rest.  No numbness. Nursing Notes were reviewed. REVIEW OF SYSTEMS    (2+ forlevel 4; 10+ for level 5)     Review of Systems  See hpi for further details. Complete 10 point review of all systems performed and is otherwise negative unless noted above.     PAST MEDICAL HISTORY     Past Medical History:   Diagnosis Date    Diabetes mellitus ([address]     Hyperlipidemia     Hypertension     Thyroid disease        SURGICAL HISTORY       Past Surgical History:   Procedure Laterality Date    ANKLE SURGERY Left     JOINT REPLACEMENT Bilateral     knee replacements    KNEE SURGERY Right        CURRENT MEDICATIONS       Discharge Medication List as of 9/13/2021  2:56 PM      CONTINUE these medications which have NOT CHANGED    Details   metoprolol tartrate (LOPRESSOR) 25 MG tablet Take 1 tablet by mouth 2 times daily, Disp-180 tablet, R-3Normal      apixaban (ELIQUIS) 5 MG TABS tablet Take 1 tablet by mouth 2 times daily, Disp-60 tablet, R-3Normal      amiodarone (CORDARONE) 200 MG tablet Take 1 tablet by mouth 2 times daily, Disp-60 tablet, R-0Print      furosemide (LASIX) 40 MG tablet Take 1 tablet by mouth daily, Disp-60 tablet, R-3Print      pioglitazone (ACTOS) 45 MG tablet TAKE 1 TABLET DAILYHistorical Med      oxyCODONE-Acetaminophen (PERCOCET PO) Take by mouthHistorical Med      ATORVASTATIN CALCIUM PO Take by mouthHistorical Med      Levothyroxine Sodium (SYNTHROID PO) Take by mouthHistorical Med      tamsulosin (FLOMAX) 0.4 MG capsule Take 1 capsule by mouth daily, Disp-30 capsule, R-3Print      pantoprazole (PROTONIX) 40 MG tablet Take 1 tablet by mouth every morning (before breakfast), Disp-30 tablet, R-3Print      NONFORMULARY Historical Med             ALLERGIES     Patient has no known allergies. FAMILY HISTORY       Family History   Problem Relation Age of Onset    Arthritis Other     Diabetes Other           SOCIAL HISTORY       Social History     Socioeconomic History    Marital status:      Spouse name: None    Number of children: None    Years of education: None    Highest education level: None   Occupational History    None   Tobacco Use    Smoking status: Never Smoker    Smokeless tobacco: Never Used   Vaping Use    Vaping Use: Never used   Substance and Sexual Activity    Alcohol use: No    Drug use: None    Sexual activity: Not Currently   Other Topics Concern    None   Social History Narrative    None     Social Determinants of Health     Financial Resource Strain:     Difficulty of Paying Living Expenses:    Food Insecurity:     Worried About Running Out of Food in the Last Year:     Ran Out of Food in the Last Year:    Transportation Needs:     Lack of Transportation (Medical):      Lack of Transportation (Non-Medical):    Physical Activity:     Days of Exercise per Week:     Minutes of Exercise per Session:    Stress:     Feeling of Stress :    Social Connections:     Frequency of Communication with Friends and Family:     Frequency of Social Gatherings with Friends and Family:     Attends Alevism Services:     Active Member of Clubs or Organizations:     Attends Club or Organization Meetings:     Marital Status:    Intimate Partner Violence:     Fear of Current or Ex-Partner:     Emotionally Abused:     Physically Abused:     Sexually Abused:        SCREENINGS           PHYSICAL EXAM    (5+ for level 4, 8+ for level 5)     ED Triage Vitals [09/13/21 1315]   BP Temp Temp Source Pulse Resp SpO2 Height Weight   127/60 98.1 °F (36.7 °C) Oral 98 16 96 % 5' 11\" (1.803 m) (!) 315 lb (142.9 kg)       Physical Exam  Constitutional:       General: He is not in acute distress. Musculoskeletal:      Comments: Right lower extremity: No proximal fibula or right knee tenderness palpation. No ankle tenderness to palpation. Achilles function intact. Bruising with tenderness to palpation of all 5 toes and metatarsal heads. DIAGNOSTIC RESULTS         RADIOLOGY (Per Emergency Physician): Interpretation per the Radiologist below, if available at the time of this note:  XR FOOT RIGHT (MIN 3 VIEWS)    Result Date: 9/13/2021  Right foot HISTORY: Fall     3 views demonstrate no acute fracture. Osteopenia is noted. There are arterial calcifications. Mild midfoot arthritis is present. LABS:  Labs Reviewed - No data to display    All other labs were within normal range or not returned as of this dictation. EMERGENCY DEPARTMENT COURSE and DIFFERENTIAL DIAGNOSIS/MDM:   Vitals:    Vitals:    09/13/21 1315   BP: 127/60   Pulse: 98   Resp: 16   Temp: 98.1 °F (36.7 °C)   TempSrc: Oral   SpO2: 96%   Weight: (!) 315 lb (142.9 kg)   Height: 5' 11\" (1.803 m)       Medications - No data to display    MDM. X-ray, ice ordered. Patient defers pain meds. Imaging negative. Patient placed in postop shoe will be discharged home with strict return precautions. Patient instructed to follow up with their primary care doctor in one-two days and return to the emergency department if worsening of the condition or any other concerns. Please see discharge instructions for further delineation regarding the specific discharge instructions explained and given to the patient.     CONSULTS:  None    PROCEDURES:  Unless otherwise noted below, none Procedures    FINAL IMPRESSION      1. Contusion of right foot, initial encounter          DISPOSITION/PLAN   DISPOSITION Decision To Discharge 09/13/2021 02:37:33 PM      PATIENT REFERRED TO:  Dallas Maier MD  [address]  [phone]    Schedule an appointment as soon as possible for a visit       [facility] Emergency Department  [address] [address]  [phone]    If symptoms worsen      DISCHARGE MEDICATIONS:  Discharge Medication List as of 9/13/2021  2:56 PM             (Please note:  Portions of this note were completed with a voice recognition program. Efforts were made to edit the dictations but occasionally words and phrases are mis-transcribed.)    Form v2016. J.5-cn    Yang Lyons DO (electronically signed)  Emergency Medicine Provider              Silvana Del Castillo DO  09/13/21 1922

## 2021-09-16 ENCOUNTER — NURSE ONLY (OUTPATIENT)
Dept: CARDIOLOGY CLINIC | Age: 76
End: 2021-09-16
Payer: MEDICARE

## 2021-09-16 DIAGNOSIS — I48.91 ATRIAL FIBRILLATION WITH RVR (HCC): Primary | ICD-10-CM

## 2021-09-16 PROCEDURE — 93000 ELECTROCARDIOGRAM COMPLETE: CPT | Performed by: INTERNAL MEDICINE

## 2021-09-17 ENCOUNTER — PREP FOR PROCEDURE (OUTPATIENT)
Dept: CARDIOLOGY CLINIC | Age: 76
End: 2021-09-17

## 2021-09-17 DIAGNOSIS — I48.91 ATRIAL FIBRILLATION WITH RVR (HCC): Primary | ICD-10-CM

## 2021-09-17 RX ORDER — SODIUM CHLORIDE 0.9 % (FLUSH) 0.9 %
5-40 SYRINGE (ML) INJECTION EVERY 12 HOURS SCHEDULED
Status: CANCELLED | OUTPATIENT
Start: 2021-09-17

## 2021-09-17 RX ORDER — SODIUM CHLORIDE 9 MG/ML
INJECTION, SOLUTION INTRAVENOUS CONTINUOUS
Status: CANCELLED | OUTPATIENT
Start: 2021-09-17

## 2021-09-17 RX ORDER — SODIUM CHLORIDE 9 MG/ML
25 INJECTION, SOLUTION INTRAVENOUS PRN
Status: CANCELLED | OUTPATIENT
Start: 2021-09-17

## 2021-09-17 RX ORDER — SODIUM CHLORIDE 0.9 % (FLUSH) 0.9 %
5-40 SYRINGE (ML) INJECTION PRN
Status: CANCELLED | OUTPATIENT
Start: 2021-09-17

## 2021-09-23 ENCOUNTER — TELEPHONE (OUTPATIENT)
Dept: CARDIOLOGY CLINIC | Age: 76
End: 2021-09-23

## 2021-09-23 NOTE — TELEPHONE ENCOUNTER
Please call patient he has questions about his medications before his procedure on 9-24-21.  Pt # [phone]

## 2021-09-23 NOTE — PRE-PROCEDURE INSTRUCTIONS
Called patient about procedure. Told to be here at 0730 for procedure at 0900. Must be NPO after midnight but can take morning medication with sips of water. Patient stated they have not missed any doses of their Eliquis. Told to have a responsible adult with them to take them home and stay with them afterwards, if they do not get admitted to hospital. Also, to bring a current list of medications. No other questions or concerns.

## 2021-09-24 ENCOUNTER — ANESTHESIA (OUTPATIENT)
Dept: CARDIAC CATH/INVASIVE PROCEDURES | Age: 76
End: 2021-09-24
Payer: MEDICARE

## 2021-09-24 ENCOUNTER — HOSPITAL ENCOUNTER (OUTPATIENT)
Dept: CARDIAC CATH/INVASIVE PROCEDURES | Age: 76
Setting detail: OUTPATIENT SURGERY
Discharge: HOME | End: 2021-09-24
Attending: INTERNAL MEDICINE | Admitting: INTERNAL MEDICINE
Payer: MEDICARE

## 2021-09-24 ENCOUNTER — ANESTHESIA EVENT (OUTPATIENT)
Dept: CARDIAC CATH/INVASIVE PROCEDURES | Age: 76
End: 2021-09-24
Payer: MEDICARE

## 2021-09-24 VITALS — TEMPERATURE: 97.7 F | WEIGHT: 315 LBS | BODY MASS INDEX: 44.1 KG/M2 | HEIGHT: 71 IN

## 2021-09-24 VITALS — SYSTOLIC BLOOD PRESSURE: 87 MMHG | OXYGEN SATURATION: 98 % | DIASTOLIC BLOOD PRESSURE: 53 MMHG

## 2021-09-24 LAB
A/G RATIO: 1.2 (ref 1.1–2.2)
ALBUMIN SERPL-MCNC: 3.6 G/DL (ref 3.4–5)
ALP BLD-CCNC: 82 U/L (ref 40–129)
ALT SERPL-CCNC: 13 U/L (ref 10–40)
ANION GAP SERPL CALCULATED.3IONS-SCNC: 9 MMOL/L (ref 3–16)
AST SERPL-CCNC: 17 U/L (ref 15–37)
BILIRUB SERPL-MCNC: 0.7 MG/DL (ref 0–1)
BUN BLDV-MCNC: 43 MG/DL (ref 7–20)
CALCIUM SERPL-MCNC: 8.7 MG/DL (ref 8.3–10.6)
CHLORIDE BLD-SCNC: 101 MMOL/L (ref 99–110)
CO2: 30 MMOL/L (ref 21–32)
CREAT SERPL-MCNC: 1.7 MG/DL (ref 0.8–1.3)
EKG ATRIAL RATE: 56 BPM
EKG ATRIAL RATE: 88 BPM
EKG DIAGNOSIS: NORMAL
EKG DIAGNOSIS: NORMAL
EKG P AXIS: 66 DEGREES
EKG P-R INTERVAL: 218 MS
EKG Q-T INTERVAL: 346 MS
EKG Q-T INTERVAL: 434 MS
EKG QRS DURATION: 94 MS
EKG QRS DURATION: 96 MS
EKG QTC CALCULATION (BAZETT): 418 MS
EKG QTC CALCULATION (BAZETT): 476 MS
EKG R AXIS: 66 DEGREES
EKG R AXIS: 74 DEGREES
EKG T AXIS: 27 DEGREES
EKG T AXIS: 65 DEGREES
EKG VENTRICULAR RATE: 114 BPM
EKG VENTRICULAR RATE: 56 BPM
GFR AFRICAN AMERICAN: 48
GFR NON-AFRICAN AMERICAN: 39
GLOBULIN: 3.1 G/DL
GLUCOSE BLD-MCNC: 154 MG/DL (ref 70–99)
INR BLD: 1.6 (ref 0.86–1.14)
POTASSIUM SERPL-SCNC: 4 MMOL/L (ref 3.5–5.1)
SODIUM BLD-SCNC: 140 MMOL/L (ref 136–145)
TOTAL PROTEIN: 6.7 G/DL (ref 6.4–8.2)

## 2021-09-24 PROCEDURE — 85610 PROTHROMBIN TIME: CPT

## 2021-09-24 PROCEDURE — 92960 CARDIOVERSION ELECTRIC EXT: CPT | Performed by: INTERNAL MEDICINE

## 2021-09-24 PROCEDURE — 93010 ELECTROCARDIOGRAM REPORT: CPT | Performed by: INTERNAL MEDICINE

## 2021-09-24 PROCEDURE — 92960 CARDIOVERSION ELECTRIC EXT: CPT

## 2021-09-24 PROCEDURE — 80053 COMPREHEN METABOLIC PANEL: CPT

## 2021-09-24 PROCEDURE — 6360000002 HC RX W HCPCS: Performed by: ANESTHESIOLOGY

## 2021-09-24 PROCEDURE — 3700000001 HC ADD 15 MINUTES (ANESTHESIA)

## 2021-09-24 PROCEDURE — 3700000000 HC ANESTHESIA ATTENDED CARE

## 2021-09-24 PROCEDURE — 93005 ELECTROCARDIOGRAM TRACING: CPT | Performed by: INTERNAL MEDICINE

## 2021-09-24 RX ORDER — DIPHENHYDRAMINE HYDROCHLORIDE 50 MG/ML
12.5 INJECTION INTRAMUSCULAR; INTRAVENOUS
Status: DISCONTINUED | OUTPATIENT
Start: 2021-09-24 | End: 2021-09-24 | Stop reason: HOSPADM

## 2021-09-24 RX ORDER — OXYCODONE HYDROCHLORIDE 5 MG/1
5 TABLET ORAL PRN
Status: DISCONTINUED | OUTPATIENT
Start: 2021-09-24 | End: 2021-09-24 | Stop reason: HOSPADM

## 2021-09-24 RX ORDER — PROPOFOL 10 MG/ML
INJECTION, EMULSION INTRAVENOUS PRN
Status: DISCONTINUED | OUTPATIENT
Start: 2021-09-24 | End: 2021-09-24 | Stop reason: SDUPTHER

## 2021-09-24 RX ORDER — HYDRALAZINE HYDROCHLORIDE 20 MG/ML
5 INJECTION INTRAMUSCULAR; INTRAVENOUS EVERY 10 MIN PRN
Status: DISCONTINUED | OUTPATIENT
Start: 2021-09-24 | End: 2021-09-24 | Stop reason: HOSPADM

## 2021-09-24 RX ORDER — SODIUM CHLORIDE 0.9 % (FLUSH) 0.9 %
5-40 SYRINGE (ML) INJECTION PRN
Status: DISCONTINUED | OUTPATIENT
Start: 2021-09-24 | End: 2021-09-24 | Stop reason: HOSPADM

## 2021-09-24 RX ORDER — SODIUM CHLORIDE 9 MG/ML
25 INJECTION, SOLUTION INTRAVENOUS PRN
Status: DISCONTINUED | OUTPATIENT
Start: 2021-09-24 | End: 2021-09-24 | Stop reason: HOSPADM

## 2021-09-24 RX ORDER — SODIUM CHLORIDE 0.9 % (FLUSH) 0.9 %
5-40 SYRINGE (ML) INJECTION EVERY 12 HOURS SCHEDULED
Status: DISCONTINUED | OUTPATIENT
Start: 2021-09-24 | End: 2021-09-24 | Stop reason: HOSPADM

## 2021-09-24 RX ORDER — OXYCODONE HYDROCHLORIDE 5 MG/1
10 TABLET ORAL PRN
Status: DISCONTINUED | OUTPATIENT
Start: 2021-09-24 | End: 2021-09-24 | Stop reason: HOSPADM

## 2021-09-24 RX ORDER — MEPERIDINE HYDROCHLORIDE 25 MG/ML
12.5 INJECTION INTRAMUSCULAR; INTRAVENOUS; SUBCUTANEOUS EVERY 5 MIN PRN
Status: DISCONTINUED | OUTPATIENT
Start: 2021-09-24 | End: 2021-09-24 | Stop reason: HOSPADM

## 2021-09-24 RX ORDER — METOCLOPRAMIDE HYDROCHLORIDE 5 MG/ML
10 INJECTION INTRAMUSCULAR; INTRAVENOUS
Status: DISCONTINUED | OUTPATIENT
Start: 2021-09-24 | End: 2021-09-24 | Stop reason: HOSPADM

## 2021-09-24 RX ORDER — LABETALOL HYDROCHLORIDE 5 MG/ML
5 INJECTION, SOLUTION INTRAVENOUS EVERY 10 MIN PRN
Status: DISCONTINUED | OUTPATIENT
Start: 2021-09-24 | End: 2021-09-24 | Stop reason: HOSPADM

## 2021-09-24 RX ORDER — MORPHINE SULFATE 4 MG/ML
1 INJECTION, SOLUTION INTRAMUSCULAR; INTRAVENOUS EVERY 5 MIN PRN
Status: DISCONTINUED | OUTPATIENT
Start: 2021-09-24 | End: 2021-09-24 | Stop reason: HOSPADM

## 2021-09-24 RX ORDER — PROMETHAZINE HYDROCHLORIDE 25 MG/ML
6.25 INJECTION, SOLUTION INTRAMUSCULAR; INTRAVENOUS
Status: DISCONTINUED | OUTPATIENT
Start: 2021-09-24 | End: 2021-09-24 | Stop reason: HOSPADM

## 2021-09-24 RX ORDER — SODIUM CHLORIDE 9 MG/ML
INJECTION, SOLUTION INTRAVENOUS CONTINUOUS
Status: DISCONTINUED | OUTPATIENT
Start: 2021-09-24 | End: 2021-09-24 | Stop reason: HOSPADM

## 2021-09-24 RX ADMIN — PROPOFOL 40 MG: 10 INJECTION, EMULSION INTRAVENOUS at 09:17

## 2021-09-24 RX ADMIN — PROPOFOL 100 MG: 10 INJECTION, EMULSION INTRAVENOUS at 09:15

## 2021-09-24 NOTE — PLAN OF CARE
H&P Update    I have reviewed the history and physical and examined the patient and find no relevant changes. I have reviewed with the patient and/or family the risks, benefits, and alternatives to the procedure. Pre-sedation Assessment    Patient:  Kieran Phalen   :   1945  Intended Procedure: D/C cardioversion      Witness: Nurses notes reviewed and agreed. Medications reviewed  Allergies: No Known Allergies  Mallampati score :    Pre-Procedure Assessment/Plan:  ASA 3 - Patient with moderate systemic disease with functional limitations    Level of Sedation Plan: Moderate sedation per anesthesiologist    Post Procedure plan: Return to same level of care

## 2021-09-24 NOTE — H&P
CC:  Follow up AF RVR     Subjective:  Mr. Talbot feels better but still has AF with RVR   Objective:     Vitals:    09/24/21 0810   Temp: 97.7 °F (36.5 °C)     138/80  HR: 95 bpm.     Physical Exam:  General:  Awake, alert, NAD  Eyes:  EOMI PERRL anicteric  Skin:  Warm and dry. Neck:  JVP normal  Chest:  Diminshed.  Rales. Cardiovascular:  Irregularly irregular tachy  Normal S1S2.  No Murmur.  No Rub.  No Gallop. Abdomen:  Soft NT  + bs  Extremities:  ++ edema  Neuro:  CN II-XII intact.  No focal deficits.  No weakness.  No lateralizing findings. Psych:  Normal thought and affect.    MSK:  No Cyanosis nor Clubbing.  Symmetrical strength upper and lower extremities             Diagnosis Orders   1. HTN (hypertension), benign      2. Hyperlipidemia, unspecified hyperlipidemia type      3. Atrial fibrillation with RVR (HCC)               Plan:  1. HF:  Continue diuresis with furosemide gtt.  Improving.  Acute diastolic HF with LVEF 05% on echo. 2. AF RVR:  Continue amiodarone at 200 bid. Pt is taking eliquis but its not listed on his outpt medication list  3. HF  Controlled presently  4. MPI:  No ischemia. Echo with no WMA. 5. CRI:          Josephine Laughlin MD, MD 7/16/2021 5:17 PM    Addendum:    Pt taking eliquis 5 mg po bid without fail for several months. Anesthesiology MD here for sedation.

## 2021-09-24 NOTE — PROCEDURES
[address]                [address]                            CARDIAC CATHETERIZATION    PATIENT NAME: Bushra Haas                      :        1945  MED REC NO:   [id number]                          ROOM:  ACCOUNT NO:   [de-identified]                           ADMIT DATE: 2021  PROVIDER:     Barry Abreu. Janis Jones MD    DATE OF PROCEDURE:  2021    INDICATION FOR PROCEDURE:  Persistent atrial fibrillation. The patient  has been taking Eliquis 5 mg p.o. b.i.d. about 40 months now. He has  done well with anticoagulation, but he has chronic shortness of breath. He presents today for DC cardioversion. Because of his body weight and  considerations for proper anesthesia, I enlisted the help of the  anesthesiology department. Dr. Nohemy Goss came by and sedated  our patient for this procedure. The Zoll pads were placed on the anterior and posterior left thoracic  position. We did synchronized cardioversion at 360 joules with failure  to restore normal sinus rhythm. We did a second DC cardioversion at 360  joules with me applying a pressure with towels onto the anterior chest.   With that attempt, we restored sinus rhythm with sinus bradycardia at 52  beats per minutes. The patient awakened and was following commands. There is no evidence for any complications. CONCLUSION:  Successful DC cardioversion after two shocks with  restoration of sinus rhythm with sinus bradycardia. PLAN:  Continue Eliquis. Discharge in a hour if no cardiac issues arise  and as long as the patient is stable.         Luis William MD    D: 2021 9:28:17       T: 2021 9:51:42     [email or web address]  Job#: [id number]     Doc#: [id number]    CC:  Jimmie Briones MD

## 2021-09-24 NOTE — ANESTHESIA POSTPROCEDURE EVALUATION
Department of Anesthesiology  Postprocedure Note    Patient: Vinod Pena  MRN: [id number]  YOB: 1945  Date of evaluation: 9/24/2021  Time:  3:37 PM     Procedure Summary     Date: 09/24/21 Room / Location: [facility] Cath Lab    Anesthesia Start: 0911 Anesthesia Stop: 8538    Procedure: [facility] CARDIOVERSION W ANES Diagnosis:     Scheduled Providers: Joe Lee MD Responsible Provider:     Anesthesia Type: general ASA Status: 3          Anesthesia Type: general    Renata Phase I:      Renata Phase II:      Last vitals: Reviewed and per EMR flowsheets.        Anesthesia Post Evaluation    Patient location during evaluation: PACU  Patient participation: complete - patient participated  Level of consciousness: awake and alert  Pain score: 0  Airway patency: patent  Nausea & Vomiting: no nausea and no vomiting  Complications: no  Cardiovascular status: hemodynamically stable  Respiratory status: acceptable  Hydration status: euvolemic

## 2021-09-24 NOTE — ANESTHESIA PRE PROCEDURE
Department of Anesthesiology  Preprocedure Note       Name:  Inge Link   Age:  67 y.o.  :  1945                                          MRN:  [id number]         Date:  2021      Surgeon: Dawson Freeman    Procedure: DC Cardioversion    Medications prior to admission:   Prior to Admission medications    Medication Sig Start Date End Date Taking?  Authorizing Provider   metoprolol tartrate (LOPRESSOR) 25 MG tablet Take 1 tablet by mouth 2 times daily 21  Yes Santhosh Iqbal MD   apixaban (ELIQUIS) 5 MG TABS tablet Take 1 tablet by mouth 2 times daily 21  Yes Santhosh Iqbal MD   amiodarone (CORDARONE) 200 MG tablet Take 1 tablet by mouth 2 times daily 21 Yes Fred Horner MD   furosemide (LASIX) 40 MG tablet Take 1 tablet by mouth daily 21  Yes Fred Horner MD   tamsulosin (FLOMAX) 0.4 MG capsule Take 1 capsule by mouth daily 21 Yes Fred Horner MD   pantoprazole (PROTONIX) 40 MG tablet Take 1 tablet by mouth every morning (before breakfast) 21 Yes Fred Horner MD   pioglitazone (ACTOS) 45 MG tablet TAKE 1 TABLET DAILY 20  Yes Historical Provider, MD   oxyCODONE-Acetaminophen (PERCOCET PO) Take by mouth   Yes Historical Provider, MD   ATORVASTATIN CALCIUM PO Take by mouth   Yes Historical Provider, MD   Levothyroxine Sodium (SYNTHROID PO) Take by mouth   Yes Historical Provider, MD   NONFORMULARY     Historical Provider, MD       Current medications:    Current Facility-Administered Medications   Medication Dose Route Frequency Provider Last Rate Last Admin    0.9 % sodium chloride infusion   IntraVENous Continuous Santhosh Iqbal MD        0.9 % sodium chloride infusion  25 mL IntraVENous PRN Santhosh Iqbal MD        sodium chloride flush 0.9 % injection 5-40 mL  5-40 mL IntraVENous 2 times per day Santhosh Iqbal MD        sodium chloride flush 0.9 % injection 5-40 mL  5-40 mL IntraVENous PRN Oliva Rodriguez MD           Allergies:  No Known Allergies    Problem List:    Patient Active Problem List   Diagnosis Code    Atrial fibrillation with RVR ([facility]) I48.91    ELLEN (acute kidney injury) ([facility] N17.9    Congestive heart failure (HCC) I50.9    Persistent atrial fibrillation (HCC) I48.19       Past Medical History:        Diagnosis Date    Diabetes mellitus ([facility]     Hyperlipidemia     Hypertension     Thyroid disease        Past Surgical History:        Procedure Laterality Date    ANKLE SURGERY Left     JOINT REPLACEMENT Bilateral     knee replacements    KNEE SURGERY Right        Social History:    Social History     Tobacco Use    Smoking status: Never Smoker    Smokeless tobacco: Never Used   Substance Use Topics    Alcohol use: No                                Counseling given: Not Answered      Vital Signs (Current):   Vitals:    09/24/21 0810   Temp: 97.7 °F (36.5 °C)   TempSrc: Oral   Weight: (!) 315 lb (142.9 kg)   Height: 5' 11\" (1.803 m)                                              BP Readings from Last 3 Encounters:   09/13/21 127/60   08/30/21 124/80   07/30/21 95/68       NPO Status:                                                                                 BMI:   Wt Readings from Last 3 Encounters:   09/24/21 (!) 315 lb (142.9 kg)   09/13/21 (!) 315 lb (142.9 kg)   08/30/21 (!) 318 lb 8 oz (144.5 kg)     Body mass index is 43.93 kg/m².     CBC:   Lab Results   Component Value Date    WBC 3.9 07/29/2021    RBC 4.10 07/29/2021    HGB 14.2 07/29/2021    HCT 41.9 07/29/2021    .2 07/29/2021    RDW 12.8 07/29/2021     07/29/2021       CMP:   Lab Results   Component Value Date     07/29/2021    K 4.7 07/29/2021    K 4.2 07/15/2021    CL 99 07/29/2021    CO2 23 07/29/2021    BUN 33 07/29/2021    CREATININE 1.7 07/29/2021    GFRAA 48 07/29/2021    AGRATIO 1.8 07/29/2021    LABGLOM 39 07/29/2021    GLUCOSE 164 07/29/2021    PROT 7.0 07/29/2021    CALCIUM 9.5 07/29/2021    BILITOT 0.8 07/29/2021    ALKPHOS 81 07/29/2021    AST 21 07/29/2021    ALT 20 07/29/2021       POC Tests: No results for input(s): POCGLU, POCNA, POCK, POCCL, POCBUN, POCHEMO, POCHCT in the last 72 hours. Coags:   Lab Results   Component Value Date    PROTIME 11.5 07/15/2021    INR 1.02 07/15/2021    APTT 37.4 07/15/2021       HCG (If Applicable): No results found for: PREGTESTUR, PREGSERUM, HCG, HCGQUANT     ABGs: No results found for: PHART, PO2ART, OOS9CXX, RSA8FDH, BEART, I9IZGHXM     Type & Screen (If Applicable):  No results found for: LABABO, LABRH    Drug/Infectious Status (If Applicable):  No results found for: HIV, HEPCAB    COVID-19 Screening (If Applicable): No results found for: COVID19        Anesthesia Evaluation  Patient summary reviewed and Nursing notes reviewed no history of anesthetic complications:   Airway: Mallampati: II  TM distance: >3 FB   Neck ROM: full  Mouth opening: > = 3 FB Dental:          Pulmonary:Negative Pulmonary ROS                              Cardiovascular:    (+) hypertension:, CHF:,                   Neuro/Psych:   Negative Neuro/Psych ROS              GI/Hepatic/Renal: Neg GI/Hepatic/Renal ROS            Endo/Other:    (+) Diabetes, . Abdominal:             Vascular: negative vascular ROS. Other Findings:             Anesthesia Plan      general     ASA 1    (69-year-old male presents for CARDIOVERSION. Plan general anesthesia with ASA standard monitors. Questions answered. Patient agreeable with anesthetic plan.  )  Induction: intravenous. Anesthetic plan and risks discussed with patient. Plan discussed with CRNA.     Attending anesthesiologist reviewed and agrees with Karla Saleh MD   9/24/2021

## 2021-09-27 ENCOUNTER — TELEPHONE (OUTPATIENT)
Dept: CARDIOLOGY CLINIC | Age: 76
End: 2021-09-27

## 2021-09-27 RX ORDER — AMIODARONE HYDROCHLORIDE 200 MG/1
200 TABLET ORAL 2 TIMES DAILY
Qty: 60 TABLET | Refills: 0 | Status: SHIPPED | OUTPATIENT
Start: 2021-09-27 | End: 2021-10-04 | Stop reason: SDUPTHER

## 2021-09-27 NOTE — TELEPHONE ENCOUNTER
Reviewed Dr Truman Mejía note from 9/24/2021 and he recommended amio 200 mg po bid. I will re-order. He can discuss medications in follow up in October.      Jaylyn Bales

## 2021-09-27 NOTE — TELEPHONE ENCOUNTER
amiodarone (CORDARONE) 200 MG tablet  Take 1 tablet by mouth 2 times daily, Disp-60 tablet, R-0  Print Last Dose: Not Recorded  Refills:0 ordered     [facility] [address] [place] [address] Michelle Jeffery [address] - F [phone]                       Patient out of medicine   Patient states on the prescription bottle it reads to take only 1 a day.  Patient has been taking 2 aday  please call patient and advise [phone]    Last appt   8-30-21  Next appt   10-11-21    Patient states his nurse said he is not in afib  Today  And should patient continue taking amiodarone

## 2021-10-04 RX ORDER — AMIODARONE HYDROCHLORIDE 200 MG/1
200 TABLET ORAL 2 TIMES DAILY
Qty: 180 TABLET | Refills: 3 | Status: SHIPPED | OUTPATIENT
Start: 2021-10-04 | End: 2021-10-08 | Stop reason: SDUPTHER

## 2021-10-04 NOTE — TELEPHONE ENCOUNTER
Requested Prescriptions     Pending Prescriptions Disp Refills    amiodarone (CORDARONE) 200 MG tablet 180 tablet 3     Sig: Take 1 tablet by mouth 2 times daily                  Last Office Visit: 8/30/2021     Next Office Visit: 10/11/2021

## 2021-10-08 RX ORDER — AMIODARONE HYDROCHLORIDE 200 MG/1
200 TABLET ORAL DAILY
Qty: 90 TABLET | Refills: 3 | Status: SHIPPED | OUTPATIENT
Start: 2021-10-08 | End: 2021-10-11 | Stop reason: SDUPTHER

## 2021-10-11 ENCOUNTER — OFFICE VISIT (OUTPATIENT)
Dept: CARDIOLOGY CLINIC | Age: 76
End: 2021-10-11
Payer: MEDICARE

## 2021-10-11 VITALS
WEIGHT: 315 LBS | HEART RATE: 56 BPM | DIASTOLIC BLOOD PRESSURE: 60 MMHG | SYSTOLIC BLOOD PRESSURE: 120 MMHG | BODY MASS INDEX: 44.63 KG/M2

## 2021-10-11 DIAGNOSIS — I48.91 ATRIAL FIBRILLATION WITH RVR (HCC): Primary | ICD-10-CM

## 2021-10-11 DIAGNOSIS — I10 HTN (HYPERTENSION), BENIGN: ICD-10-CM

## 2021-10-11 DIAGNOSIS — E78.5 HYPERLIPIDEMIA, UNSPECIFIED HYPERLIPIDEMIA TYPE: ICD-10-CM

## 2021-10-11 PROCEDURE — 93000 ELECTROCARDIOGRAM COMPLETE: CPT | Performed by: INTERNAL MEDICINE

## 2021-10-11 PROCEDURE — 99214 OFFICE O/P EST MOD 30 MIN: CPT | Performed by: INTERNAL MEDICINE

## 2021-10-11 RX ORDER — AMIODARONE HYDROCHLORIDE 200 MG/1
200 TABLET ORAL DAILY
Qty: 90 TABLET | Refills: 3 | Status: SHIPPED | OUTPATIENT
Start: 2021-10-11

## 2021-10-11 NOTE — PROGRESS NOTES
CC:  Follow up AF RVR     Subjective:  Mr. Elo Sandoval feels better but still has AF with RVR   Objective:   Vitals:    10/11/21 1510   BP: 120/60   Pulse: 56           Physical Exam:  General:  Awake, alert, NAD  Eyes:  EOMI PERRL anicteric  Skin:  Warm and dry. Neck:  JVP normal  Chest:  Diminshed. Rales. Cardiovascular:  Irregularly irregular tachy  Normal S1S2. No Murmur. No Rub. No Gallop. Abdomen:  Soft NT  + bs  Extremities:  ++ edema  Neuro:  CN II-XII intact. No focal deficits. No weakness. No lateralizing findings. Psych:  Normal thought and affect. MSK:  No Cyanosis nor Clubbing. Symmetrical strength upper and lower extremities           Diagnosis Orders   1. Atrial fibrillation with RVR (HCC)  EKG 12 lead   2. HTN (hypertension), benign     3. Hyperlipidemia, unspecified hyperlipidemia type           Plan:  1. HF:  Continue diuresis with furosemide gtt. Improving. Acute diastolic HF with LVEF 72% on echo. 2. AF RVR:  Continue amiodarone at 200 daily. Pt is taking eliquis but its not listed on his outpt medication list  3. HF  Controlled presently  4. MPI:  No ischemia. Echo with no WMA.   5. CRI:         Janessa Adams MD, MD 7/16/2021 5:17 PM

## 2021-11-01 ENCOUNTER — TELEPHONE (OUTPATIENT)
Dept: CARDIOLOGY CLINIC | Age: 76
End: 2021-11-01

## 2021-11-01 NOTE — TELEPHONE ENCOUNTER
Patient called to see if he should still be taking  metoprolol 25 mg one tablet BID. If so he will need new prescription of this sent to [facility] and two weeks' worth sent to [facility].

## 2021-11-17 ENCOUNTER — TELEPHONE (OUTPATIENT)
Dept: CARDIOLOGY CLINIC | Age: 76
End: 2021-11-17

## 2021-11-17 NOTE — TELEPHONE ENCOUNTER
The patient needs to have a tooth extraction and an implant. The patient needs to know if he can hold his Eliquis 5 mg (up to Dr. Amber Henry how long). Please ilene the patient back at [phone] to advise.

## 2022-02-16 ENCOUNTER — TELEPHONE (OUTPATIENT)
Dept: CARDIOLOGY CLINIC | Age: 77
End: 2022-02-16

## 2022-04-27 NOTE — TELEPHONE ENCOUNTER
Requested Prescriptions     Pending Prescriptions Disp Refills    metoprolol tartrate (LOPRESSOR) 25 MG tablet [Pharmacy Med Name: METOPROLOL TARTRATE TABS 25MG] 180 tablet 3     Sig: TAKE 1 TABLET TWICE A DAY              Last Office Visit: 10/11/2021     Next Office Visit: 5/13/2022Last Labs: 9/24/2021 CMP
(4) Less than 3 years old

## 2022-05-13 ENCOUNTER — OFFICE VISIT (OUTPATIENT)
Dept: CARDIOLOGY CLINIC | Age: 77
End: 2022-05-13
Payer: MEDICARE

## 2022-05-13 VITALS
BODY MASS INDEX: 46.03 KG/M2 | HEART RATE: 60 BPM | DIASTOLIC BLOOD PRESSURE: 76 MMHG | SYSTOLIC BLOOD PRESSURE: 124 MMHG | WEIGHT: 315 LBS

## 2022-05-13 DIAGNOSIS — I48.91 ATRIAL FIBRILLATION WITH RVR (HCC): Primary | ICD-10-CM

## 2022-05-13 DIAGNOSIS — R06.02 SOB (SHORTNESS OF BREATH) ON EXERTION: ICD-10-CM

## 2022-05-13 PROCEDURE — 99214 OFFICE O/P EST MOD 30 MIN: CPT | Performed by: INTERNAL MEDICINE

## 2022-05-13 NOTE — PROGRESS NOTES
CC:  Follow up AF RVR     Subjective:  Mr. Elizabeth Urrutia feels better but still has AF with RVR   Objective:   Vitals:    05/13/22 1614   BP: 124/76   Pulse: 60           Physical Exam:  General:  Awake, alert, NAD  Eyes:  EOMI PERRL anicteric  Skin:  Warm and dry. Neck:  JVP normal  Chest:  Diminshed. Rales. Cardiovascular:  Irregularly irregular tachy  Normal S1S2. No Murmur. No Rub. No Gallop. Abdomen:  Soft NT  + bs  Extremities:  ++ edema  Neuro:  CN II-XII intact. No focal deficits. No weakness. No lateralizing findings. Psych:  Normal thought and affect. MSK:  No Cyanosis nor Clubbing. Symmetrical strength upper and lower extremities         Plan:  1. HF:  Continue diuresis with furosemide gtt. Improving. Acute diastolic HF with LVEF 77% on echo. 2. AF RVR:  Continue amiodarone at 200 daily. Pt is taking eliquis but its not listed on his outpt medication list  3. HF  Controlled presently  4. MPI:  No ischemia. Echo with no WMA.   prob has YOHANA  Needs sleep study    Pierce Chua MD, MD 7/16/2021 5:17 PM

## 2022-10-24 RX ORDER — APIXABAN 5 MG/1
TABLET, FILM COATED ORAL
Qty: 180 TABLET | Refills: 3 | Status: SHIPPED | OUTPATIENT
Start: 2022-10-24

## 2022-10-24 NOTE — TELEPHONE ENCOUNTER
Requested Prescriptions     Pending Prescriptions Disp Refills    ELIQUIS 5 MG TABS tablet [Pharmacy Med Name: ELIQUIS TABS 5MG] 180 tablet 3     Sig: TAKE 1 TABLET TWICE A DAY            Last Office Visit: 10/11/2021     Next Office Visit: 11.18.2022      Last Labs: [phone] Lissy Nguyen lab results

## 2023-01-05 NOTE — PLAN OF CARE
No evidence of infection/dehiscence. Problem: Falls - Risk of:  Goal: Will remain free from falls  Description: Will remain free from falls  7/16/2021 0013 by Sadia Gilbert RN  Outcome: Ongoing    Bed alarm activated, hourly rounding, non skid footwear in place, call light within reach, bed locked and in lowest position. Environmental risks addressed. 7/15/2021 1704 by Haydee Magallanes RN  Outcome: Ongoing  Note: Pt is alert and oriented x 4. Bed alarm on, call light within reach. Pt calls for assistance  Goal: Absence of physical injury  Description: Absence of physical injury  Outcome: Ongoing     Problem: Pain:  Goal: Pain level will decrease  Description: Pain level will decrease  Outcome: Ongoing  Goal: Control of acute pain  Description: Control of acute pain  Outcome: Ongoing  Goal: Control of chronic pain  Description: Control of chronic pain  7/16/2021 0013 by Sadia Gilbert RN  Outcome: Ongoing  7/15/2021 1704 by Haydee Magallanes RN  Outcome: Ongoing  Note: Pt c/o chronic pain to back and legs. Administered 1 percocet and pt resting comfortably after administration. Problem: Fluid Volume - Imbalance:  Goal: Absence of imbalanced fluid volume signs and symptoms  Description: Absence of imbalanced fluid volume signs and symptoms  7/16/2021 0013 by Sadia Gilbert RN  Outcome: Ongoing  7/15/2021 1704 by Haydee Magallanes RN  Outcome: Ongoing  Note: +2 pitting edema to BLE. Lasix gtt @ 5 mg/hr.  Urinating well per urinal.     Problem: Cardiac Output - Decreased:  Goal: Hemodynamic stability will improve  Description: Hemodynamic stability will improve  Outcome: Ongoing     Problem: OXYGENATION/RESPIRATORY FUNCTION  Goal: Patient will maintain patent airway  Outcome: Ongoing  Goal: Patient will achieve/maintain normal respiratory rate/effort  Description: Respiratory rate and effort will be within normal limits for the patient  Outcome: Ongoing     Problem: HEMODYNAMIC STATUS  Goal: Patient has stable vital signs and fluid balance  7/16/2021 0013 by Terese Alejandre, RN  Outcome: Ongoing  7/15/2021 1704 by Nikhil Luu, RN  Outcome: Ongoing  Note: BP stable, -130s at rest, up to 150s with exertion. Amio gtt infusing. Received order for digoxin IV x 1 per Dr. Jorja Cheadle.       Problem: FLUID AND ELECTROLYTE IMBALANCE  Goal: Fluid and electrolyte balance are achieved/maintained  Outcome: Ongoing     Problem: ACTIVITY INTOLERANCE/IMPAIRED MOBILITY  Goal: Mobility/activity is maintained at optimum level for patient  Outcome: Ongoing

## 2023-01-24 ENCOUNTER — TELEPHONE (OUTPATIENT)
Dept: CARDIOLOGY CLINIC | Age: 78
End: 2023-01-24

## 2023-02-09 ENCOUNTER — OFFICE VISIT (OUTPATIENT)
Dept: CARDIOLOGY CLINIC | Age: 78
End: 2023-02-09
Payer: MEDICARE

## 2023-02-09 VITALS
DIASTOLIC BLOOD PRESSURE: 72 MMHG | HEART RATE: 57 BPM | SYSTOLIC BLOOD PRESSURE: 136 MMHG | BODY MASS INDEX: 48.4 KG/M2 | WEIGHT: 315 LBS

## 2023-02-09 DIAGNOSIS — I48.91 ATRIAL FIBRILLATION WITH RVR (HCC): Primary | ICD-10-CM

## 2023-02-09 PROCEDURE — 93000 ELECTROCARDIOGRAM COMPLETE: CPT | Performed by: INTERNAL MEDICINE

## 2023-02-09 PROCEDURE — 1123F ACP DISCUSS/DSCN MKR DOCD: CPT | Performed by: INTERNAL MEDICINE

## 2023-02-09 PROCEDURE — 99213 OFFICE O/P EST LOW 20 MIN: CPT | Performed by: INTERNAL MEDICINE

## 2023-02-09 RX ORDER — TORSEMIDE 20 MG/1
TABLET ORAL
COMMUNITY
Start: 2023-01-25

## 2023-02-09 NOTE — PROGRESS NOTES
CC:  Follow up AF RVR     Subjective:    Mr. Aleksey Joshua feels better but still has AF with RVR     Objective:   Vitals:    02/09/23 1458   BP: 136/72   Pulse: 57        Exam unchanged from 5/13/22    Physical Exam:  General:  Awake, alert, NAD  Eyes:  EOMI PERRL anicteric  Skin:  Warm and dry. Neck:  JVP normal  Chest:  Diminshed. Rales. Cardiovascular:  Irregularly irregular tachy  Normal S1S2. No Murmur. No Rub. No Gallop. Abdomen:  Soft NT  + bs  Extremities:  ++ edema  Neuro:  CN II-XII intact. No focal deficits. No weakness. No lateralizing findings. Psych:  Normal thought and affect. MSK:  No Cyanosis nor Clubbing. Symmetrical strength upper and lower extremities         Plan:  HF:  Continue diuresis with furosemide gtt. Improving. Acute diastolic HF with LVEF 00% on echo. AF RVR:  Continue amiodarone at 200 daily. Pt is taking eliquis but its not listed on his outpt medication list  HF  Controlled presently  MPI:  No ischemia. Echo with no WMA.   prob has YOHANA  Needs sleep study    Gela Blount MD, MD 7/16/2021 5:17 PM

## 2023-03-23 NOTE — PROGRESS NOTES
Balloon removed intact. HR sustaining 110-130 afib while in bed, up to 150s with exertion. Amio @ 0.5 mg/min. Page sent to Dr. Edelmira Hernandez.   Vargas Medrano RN  7/15/2021  3:55 PM

## 2023-06-09 ENCOUNTER — OFFICE VISIT (OUTPATIENT)
Dept: CARDIOLOGY CLINIC | Age: 78
End: 2023-06-09
Payer: MEDICARE

## 2023-06-09 VITALS
SYSTOLIC BLOOD PRESSURE: 134 MMHG | BODY MASS INDEX: 49.26 KG/M2 | WEIGHT: 315 LBS | DIASTOLIC BLOOD PRESSURE: 70 MMHG | HEART RATE: 66 BPM

## 2023-06-09 DIAGNOSIS — E78.2 MIXED HYPERLIPIDEMIA: ICD-10-CM

## 2023-06-09 DIAGNOSIS — I48.0 PAROXYSMAL ATRIAL FIBRILLATION (HCC): ICD-10-CM

## 2023-06-09 DIAGNOSIS — I48.91 ATRIAL FIBRILLATION WITH RVR (HCC): Primary | ICD-10-CM

## 2023-06-09 DIAGNOSIS — R73.9 HYPERGLYCEMIA: ICD-10-CM

## 2023-06-09 DIAGNOSIS — R60.1 GENERALIZED EDEMA: ICD-10-CM

## 2023-06-09 PROCEDURE — 93000 ELECTROCARDIOGRAM COMPLETE: CPT | Performed by: INTERNAL MEDICINE

## 2023-06-09 PROCEDURE — 99214 OFFICE O/P EST MOD 30 MIN: CPT | Performed by: INTERNAL MEDICINE

## 2023-06-09 PROCEDURE — 1123F ACP DISCUSS/DSCN MKR DOCD: CPT | Performed by: INTERNAL MEDICINE

## 2023-06-09 NOTE — PROGRESS NOTES
CC:  Follow up AF RVR     Subjective:    Mr. Robyn Benton feels better but still has AF with RVR     Objective:   Vitals:    06/09/23 1557   BP: 134/70   Pulse: 66        Exam unchanged from 5/13/22    Physical Exam:  General:  Awake, alert, NAD  Eyes:  EOMI PERRL anicteric  Skin:  Warm and dry. Neck:  JVP normal  Chest:  Diminshed. Rales. Cardiovascular:  Irregularly irregular tachy  Normal S1S2. No Murmur. No Rub. No Gallop. Abdomen:  Soft NT  + bs  Extremities:  ++ edema  Neuro:  CN II-XII intact. No focal deficits. No weakness. No lateralizing findings. Psych:  Normal thought and affect. MSK:  No Cyanosis nor Clubbing. Symmetrical strength upper and lower extremities         Plan:  HF:  Continue diuresis with furosemide gtt. Improving. Acute diastolic HF with LVEF 52% on echo. AF RVR:  Continue amiodarone at 200 daily. Pt is taking eliquis but its not listed on his outpt medication list  HF  Controlled presently  MPI:  No ischemia. Echo with no WMA. Edema:  incr torsemide to 2 20 mg bid for a week.     prob has YOHANA  Needs sleep study    Tank Ponce MD, MD 7/16/2021 5:17 PM

## 2023-06-13 ENCOUNTER — TELEPHONE (OUTPATIENT)
Dept: CARDIOLOGY CLINIC | Age: 78
End: 2023-06-13

## 2023-06-13 DIAGNOSIS — R60.1 GENERALIZED EDEMA: ICD-10-CM

## 2023-06-13 DIAGNOSIS — E78.2 MIXED HYPERLIPIDEMIA: ICD-10-CM

## 2023-06-13 DIAGNOSIS — I48.0 PAROXYSMAL ATRIAL FIBRILLATION (HCC): ICD-10-CM

## 2023-06-13 DIAGNOSIS — R73.9 HYPERGLYCEMIA: ICD-10-CM

## 2023-06-13 DIAGNOSIS — I48.91 ATRIAL FIBRILLATION WITH RVR (HCC): ICD-10-CM

## 2023-06-14 LAB
BASOPHILS # BLD: 0 K/UL (ref 0–0.2)
BASOPHILS NFR BLD: 0.3 %
CHOLEST SERPL-MCNC: 155 MG/DL (ref 0–199)
DEPRECATED RDW RBC AUTO: 13.8 % (ref 12.4–15.4)
EOSINOPHIL # BLD: 0.1 K/UL (ref 0–0.6)
EOSINOPHIL NFR BLD: 2.4 %
EST. AVERAGE GLUCOSE BLD GHB EST-MCNC: 157.1 MG/DL
HBA1C MFR BLD: 7.1 %
HCT VFR BLD AUTO: 36.4 % (ref 40.5–52.5)
HDLC SERPL-MCNC: 36 MG/DL (ref 40–60)
HGB BLD-MCNC: 12.8 G/DL (ref 13.5–17.5)
LDLC SERPL CALC-MCNC: 76 MG/DL
LYMPHOCYTES # BLD: 2.2 K/UL (ref 1–5.1)
LYMPHOCYTES NFR BLD: 47.5 %
MCH RBC QN AUTO: 36.7 PG (ref 26–34)
MCHC RBC AUTO-ENTMCNC: 35.1 G/DL (ref 31–36)
MCV RBC AUTO: 104.7 FL (ref 80–100)
MONOCYTES # BLD: 0.4 K/UL (ref 0–1.3)
MONOCYTES NFR BLD: 9.3 %
NEUTROPHILS # BLD: 1.9 K/UL (ref 1.7–7.7)
NEUTROPHILS NFR BLD: 40.5 %
PLATELET # BLD AUTO: 201 K/UL (ref 135–450)
PMV BLD AUTO: 9 FL (ref 5–10.5)
RBC # BLD AUTO: 3.47 M/UL (ref 4.2–5.9)
T4 FREE SERPL-MCNC: 2.1 NG/DL (ref 0.9–1.8)
TRIGL SERPL-MCNC: 217 MG/DL (ref 0–150)
TSH SERPL DL<=0.005 MIU/L-ACNC: 4.93 UIU/ML (ref 0.27–4.2)
VLDLC SERPL CALC-MCNC: 43 MG/DL
WBC # BLD AUTO: 4.6 K/UL (ref 4–11)

## 2023-06-21 DIAGNOSIS — I10 HTN (HYPERTENSION), BENIGN: Primary | ICD-10-CM

## 2023-07-13 ENCOUNTER — OFFICE VISIT (OUTPATIENT)
Dept: CARDIOLOGY CLINIC | Age: 78
End: 2023-07-13

## 2023-07-13 VITALS
WEIGHT: 315 LBS | DIASTOLIC BLOOD PRESSURE: 68 MMHG | HEART RATE: 67 BPM | BODY MASS INDEX: 48.73 KG/M2 | SYSTOLIC BLOOD PRESSURE: 120 MMHG

## 2023-07-13 DIAGNOSIS — I10 HTN (HYPERTENSION), BENIGN: Primary | ICD-10-CM

## 2023-07-13 DIAGNOSIS — E78.5 HYPERLIPIDEMIA, UNSPECIFIED HYPERLIPIDEMIA TYPE: ICD-10-CM

## 2023-07-13 DIAGNOSIS — I48.91 ATRIAL FIBRILLATION WITH RVR (HCC): ICD-10-CM

## 2023-07-13 DIAGNOSIS — R73.9 HYPERGLYCEMIA: ICD-10-CM

## 2023-07-13 DIAGNOSIS — I10 HTN (HYPERTENSION), BENIGN: ICD-10-CM

## 2023-07-13 DIAGNOSIS — R60.1 GENERALIZED EDEMA: ICD-10-CM

## 2023-07-13 DIAGNOSIS — I25.10 CORONARY ARTERY DISEASE INVOLVING NATIVE CORONARY ARTERY OF NATIVE HEART WITHOUT ANGINA PECTORIS: ICD-10-CM

## 2023-07-13 NOTE — PROGRESS NOTES
CC:  Follow up AF RVR     Subjective:    Mr. Jauregui Fairly feels better and has controlled HR today. Objective:   Vitals:    07/13/23 1410   BP: 120/68   Pulse: 67        Exam unchanged from 5/13/22    Physical Exam:  General:  Awake, alert, NAD  Eyes:  EOMI PERRL anicteric  Skin:  Warm and dry. Neck:  JVP normal  Chest:  Diminshed. Rales. Cardiovascular: reg rhythm. Normal S1S2. No Murmur. No Rub. No Gallop. Abdomen:  Soft NT  + bs  Extremities:  tr. edema  Neuro:  CN II-XII intact. No focal deficits. No weakness. No lateralizing findings. Psych:  Normal thought and affect. MSK:  No Cyanosis nor Clubbing. Symmetrical strength upper and lower extremities         Plan:  HF:  Continue diuresis with torsemide 2 am and 2 in pm.  Improving. Acute diastolic HF with LVEF 48% on echo. AF RVR: seems to be in SR clinically. Continue amiodarone at 200 daily. Pt is taking eliquis  HF  Controlled presently  MPI:  No ischemia. Echo with no WMA.   prob has YOHANA  Needs sleep study    Marleny Ivan MD, MD 7/16/2021 5:17 PM

## 2023-07-14 LAB
ALBUMIN SERPL-MCNC: 3.8 G/DL (ref 3.4–5)
ALBUMIN/GLOB SERPL: 1.6 {RATIO} (ref 1.1–2.2)
ALP SERPL-CCNC: 75 U/L (ref 40–129)
ALT SERPL-CCNC: 15 U/L (ref 10–40)
ANION GAP SERPL CALCULATED.3IONS-SCNC: 12 MMOL/L (ref 3–16)
AST SERPL-CCNC: 18 U/L (ref 15–37)
BILIRUB SERPL-MCNC: 0.6 MG/DL (ref 0–1)
BUN SERPL-MCNC: 57 MG/DL (ref 7–20)
CALCIUM SERPL-MCNC: 9.2 MG/DL (ref 8.3–10.6)
CHLORIDE SERPL-SCNC: 97 MMOL/L (ref 99–110)
CHOLEST SERPL-MCNC: 158 MG/DL (ref 0–199)
CO2 SERPL-SCNC: 31 MMOL/L (ref 21–32)
CREAT SERPL-MCNC: 2.1 MG/DL (ref 0.8–1.3)
GFR SERPLBLD CREATININE-BSD FMLA CKD-EPI: 31 ML/MIN/{1.73_M2}
GLUCOSE SERPL-MCNC: 188 MG/DL (ref 70–99)
HDLC SERPL-MCNC: 27 MG/DL (ref 40–60)
LDLC SERPL CALC-MCNC: 81 MG/DL
POTASSIUM SERPL-SCNC: 4.3 MMOL/L (ref 3.5–5.1)
PROT SERPL-MCNC: 6.2 G/DL (ref 6.4–8.2)
SODIUM SERPL-SCNC: 140 MMOL/L (ref 136–145)
TRIGL SERPL-MCNC: 249 MG/DL (ref 0–150)
VLDLC SERPL CALC-MCNC: 50 MG/DL

## 2023-07-17 RX ORDER — FENOFIBRATE 145 MG/1
145 TABLET, COATED ORAL DAILY
Qty: 90 TABLET | Refills: 2 | Status: SHIPPED | OUTPATIENT
Start: 2023-07-17

## 2023-07-27 ENCOUNTER — TELEPHONE (OUTPATIENT)
Dept: CARDIOLOGY CLINIC | Age: 78
End: 2023-07-27

## 2023-07-27 RX ORDER — TORSEMIDE 20 MG/1
TABLET ORAL
Qty: 360 TABLET | Refills: 1 | Status: SHIPPED | OUTPATIENT
Start: 2023-07-27

## 2023-07-27 NOTE — TELEPHONE ENCOUNTER
Pt states he was put on 20 mg QID. Previous fill was 20mg  2.5 daily.      Please advise     Requested Prescriptions     Pending Prescriptions Disp Refills    torsemide (DEMADEX) 20 MG tablet 306 tablet 3     Sig: Take 1 tablet by mouth 4 times daily          Number: 360    Refills: 3    Last Office Visit: 7/13/2023     Next Office Visit: 11/9/2023

## 2023-08-14 NOTE — TELEPHONE ENCOUNTER
Requested Prescriptions     Pending Prescriptions Disp Refills    metoprolol tartrate (LOPRESSOR) 25 MG tablet [Pharmacy Med Name: Metoprolol Tartrate 25 MG Oral Tablet] 180 tablet 3     Sig: TAKE 1 TABLET BY MOUTH TWICE  DAILY            Last Office Visit: 7/13/2023     Next Office Visit: 11/9/2023

## 2023-08-30 ENCOUNTER — HOSPITAL ENCOUNTER (EMERGENCY)
Age: 78
Discharge: HOME | End: 2023-08-30
Attending: EMERGENCY MEDICINE
Payer: MEDICARE

## 2023-08-30 VITALS
BODY MASS INDEX: 44.1 KG/M2 | DIASTOLIC BLOOD PRESSURE: 62 MMHG | SYSTOLIC BLOOD PRESSURE: 131 MMHG | HEART RATE: 57 BPM | TEMPERATURE: 98.4 F | OXYGEN SATURATION: 98 % | WEIGHT: 315 LBS | HEIGHT: 71 IN | RESPIRATION RATE: 16 BRPM

## 2023-08-30 DIAGNOSIS — R22.42 LOCALIZED SWELLING OF LEFT LOWER EXTREMITY: Primary | ICD-10-CM

## 2023-08-30 DIAGNOSIS — L03.116 LEFT LEG CELLULITIS: ICD-10-CM

## 2023-08-30 PROCEDURE — 99283 EMERGENCY DEPT VISIT LOW MDM: CPT

## 2023-08-30 PROCEDURE — 6370000000 HC RX 637 (ALT 250 FOR IP): Performed by: EMERGENCY MEDICINE

## 2023-08-30 RX ORDER — DOXYCYCLINE HYCLATE 100 MG
100 TABLET ORAL 2 TIMES DAILY
Qty: 20 TABLET | Refills: 0 | Status: SHIPPED | OUTPATIENT
Start: 2023-08-30 | End: 2023-09-09

## 2023-08-30 RX ORDER — DOXYCYCLINE 100 MG/1
100 CAPSULE ORAL ONCE
Status: COMPLETED | OUTPATIENT
Start: 2023-08-30 | End: 2023-08-30

## 2023-08-30 RX ADMIN — DOXYCYCLINE 100 MG: 100 CAPSULE ORAL at 14:14

## 2023-08-30 ASSESSMENT — PAIN - FUNCTIONAL ASSESSMENT
PAIN_FUNCTIONAL_ASSESSMENT: NONE - DENIES PAIN
PAIN_FUNCTIONAL_ASSESSMENT: NONE - DENIES PAIN

## 2023-08-30 ASSESSMENT — ENCOUNTER SYMPTOMS
SHORTNESS OF BREATH: 0
TROUBLE SWALLOWING: 0
VOICE CHANGE: 0
WHEEZING: 0

## 2023-08-30 NOTE — ED NOTES
Patient given prescription, discharge instructions verbal and written, patient verbalized understanding. Alert/oriented X4, Clear speech.   Patient exhibits no distress, ambulates with steady gait per self leaving unit, no further request.      Shamar Roman RN  08/30/23 1815

## 2023-08-30 NOTE — DISCHARGE INSTRUCTIONS
Please keep your leg wrapped and elevated. Please follow-up with your primary care doctor in the next 2 to 5 days. If your symptoms worsen please come back to the ER.

## 2023-08-30 NOTE — ED NOTES
Patient given prescription, discharge instructions verbal and written, patient verbalized understanding. Alert/oriented X4, Clear speech. Patient exhibits no distress, ambulates with steady gait per self leaving unit, no further request. Leg wound cleaned and dressed.      Sharlene Pritchett RN  08/30/23 3897

## 2023-09-01 ENCOUNTER — HOSPITAL ENCOUNTER (OUTPATIENT)
Dept: WOUND CARE | Age: 78
Discharge: HOME | End: 2023-09-01
Payer: MEDICARE

## 2023-09-01 VITALS
BODY MASS INDEX: 48.18 KG/M2 | SYSTOLIC BLOOD PRESSURE: 154 MMHG | TEMPERATURE: 97.3 F | HEART RATE: 63 BPM | RESPIRATION RATE: 16 BRPM | WEIGHT: 315 LBS | DIASTOLIC BLOOD PRESSURE: 44 MMHG

## 2023-09-01 DIAGNOSIS — I87.312 STASIS EDEMA WITH ULCER OF LEFT LOWER EXTREMITY (HCC): ICD-10-CM

## 2023-09-01 DIAGNOSIS — L97.929 STASIS EDEMA WITH ULCER OF LEFT LOWER EXTREMITY (HCC): ICD-10-CM

## 2023-09-01 PROCEDURE — 97597 DBRDMT OPN WND 1ST 20 CM/<: CPT

## 2023-09-01 PROCEDURE — 29581 APPL MULTLAYER CMPRN SYS LEG: CPT

## 2023-09-01 PROCEDURE — 11721 DEBRIDE NAIL 6 OR MORE: CPT

## 2023-09-01 PROCEDURE — 97598 DBRDMT OPN WND ADDL 20CM/<: CPT

## 2023-09-01 PROCEDURE — 6370000000 HC RX 637 (ALT 250 FOR IP): Performed by: PODIATRIST

## 2023-09-01 PROCEDURE — 99213 OFFICE O/P EST LOW 20 MIN: CPT

## 2023-09-01 RX ORDER — CEPHALEXIN 500 MG/1
CAPSULE ORAL
COMMUNITY
Start: 2023-08-18 | End: 2023-09-01

## 2023-09-01 RX ORDER — GINSENG 100 MG
CAPSULE ORAL ONCE
OUTPATIENT
Start: 2023-09-01 | End: 2023-09-01

## 2023-09-01 RX ORDER — LIDOCAINE HYDROCHLORIDE 20 MG/ML
JELLY TOPICAL ONCE
OUTPATIENT
Start: 2023-09-01 | End: 2023-09-01

## 2023-09-01 RX ORDER — LIDOCAINE 50 MG/G
OINTMENT TOPICAL ONCE
OUTPATIENT
Start: 2023-09-01 | End: 2023-09-01

## 2023-09-01 RX ORDER — BACITRACIN ZINC AND POLYMYXIN B SULFATE 500; 1000 [USP'U]/G; [USP'U]/G
OINTMENT TOPICAL ONCE
OUTPATIENT
Start: 2023-09-01 | End: 2023-09-01

## 2023-09-01 RX ORDER — LIDOCAINE HYDROCHLORIDE 40 MG/ML
SOLUTION TOPICAL ONCE
Status: COMPLETED | OUTPATIENT
Start: 2023-09-01 | End: 2023-09-01

## 2023-09-01 RX ORDER — LIDOCAINE 40 MG/G
CREAM TOPICAL ONCE
OUTPATIENT
Start: 2023-09-01 | End: 2023-09-01

## 2023-09-01 RX ORDER — LIDOCAINE HYDROCHLORIDE 40 MG/ML
SOLUTION TOPICAL ONCE
OUTPATIENT
Start: 2023-09-01 | End: 2023-09-01

## 2023-09-01 RX ORDER — BETAMETHASONE DIPROPIONATE 0.05 %
OINTMENT (GRAM) TOPICAL ONCE
OUTPATIENT
Start: 2023-09-01 | End: 2023-09-01

## 2023-09-01 RX ORDER — AMOXICILLIN 500 MG/1
CAPSULE ORAL
COMMUNITY
Start: 2023-08-29

## 2023-09-01 RX ORDER — SODIUM CHLOR/HYPOCHLOROUS ACID 0.033 %
SOLUTION, IRRIGATION IRRIGATION ONCE
OUTPATIENT
Start: 2023-09-01 | End: 2023-09-01

## 2023-09-01 RX ORDER — IBUPROFEN 200 MG
TABLET ORAL ONCE
OUTPATIENT
Start: 2023-09-01 | End: 2023-09-01

## 2023-09-01 RX ORDER — GENTAMICIN SULFATE 1 MG/G
OINTMENT TOPICAL ONCE
OUTPATIENT
Start: 2023-09-01 | End: 2023-09-01

## 2023-09-01 RX ORDER — CLOBETASOL PROPIONATE 0.5 MG/G
OINTMENT TOPICAL ONCE
OUTPATIENT
Start: 2023-09-01 | End: 2023-09-01

## 2023-09-01 RX ADMIN — LIDOCAINE HYDROCHLORIDE: 40 SOLUTION TOPICAL at 14:18

## 2023-09-01 NOTE — DISCHARGE INSTRUCTIONS
[facility] Physician Orders and Discharge Instructions  [address] [address] [address] [place] [address]. [facility]  Telephone: [phone]  NAME:  Kelin Hernández OF BIRTH:  1945  MEDICAL RECORD NUMBER:  [id number]  DATE: 9/1/23     Cast cover at store    Return Appointment:  Return Appointment: With John Pham DPM  in  1 [facility])  [] Return Appointment for a Wound Assessment with the nurse on:     Future Appointments   Date Time Provider 4600  [facility]   11/9/2023  2:45 PM Isela Cordero  [address]: none    Medically necessary services for evaluation and treatment: []Skilled Nursing (using clean technique) []PT (Eval & Treat) []OT (Eval & Treat) []Social Work []Dietician []Other:      Wound care instructions: If you smoke we ask that you refrain from smoking. Smoking inhibits wounds from healing. When taking antibiotics take the entire prescription as ordered. Do not stop taking until medication is all gone unless otherwise instructed. Exercise as tolerated. Keep weight off wounds and reposition every 2 hours if applicable. Do not get wounds wet in the bath or shower unless otherwise instructed by your physician. If your wound is on your foot or leg, you may purchase a cast bag. Please ask at the pharmacy. Wash hands with soap and water prior to and after every dressing change. [x]Wash wounds with: 0.9% normal saline  [x]Uzma wound Topical Treatments: Do not apply lotions, creams, or ointments to the skin around the wound bed unless directed as followed:   Apply around the wound: No-Sting barrier film         [x]Wound Location: left lower leg   Apply Primary Dressing to wound: Foam with silver (I.e. Polymem Ag)  Secondary Dressing: Extra absorbant pad   Avoid contact of tape with skin if possible.   When to change Dressing: DO NOT CHANGE            [x] Multilayer Compression Wrap:  Type: Applied on Left

## 2023-09-01 NOTE — PROGRESS NOTES
Multilayer Compression Wrap   (Not Unna) Below the Knee    NAME:  Clau Bergeron OF BIRTH:  1945  MEDICAL RECORD NUMBER:  [id number]  DATE:  9/1/2023       Removed old Multilayer wrap if present and washed leg with mild soap/water. Applied moisturizing agent to dry skin as needed. Applied primary and secondary dressing as ordered    Applied multilayered dressing below the knee to Left lower leg(s)  (3 Layer Compression Wrap ) . Instructed patient/caregiver not to remove dressing and to keep it clean and dry. Instructed patient/caregiver on complications to report to provider, such as pain, numbness in toes, heavy drainage, and slippage of dressing. Instructed patient on purpose of compression dressing and on activity and exercise recommendations.    Applied per  [employer] Guidelines    Electronically signed by Gareth Ponce RN on 9/1/2023 at 3:21 PM
reposition every 2 hours if applicable. Do not get wounds wet in the bath or shower unless otherwise instructed by your physician. If your wound is on your foot or leg, you may purchase a cast bag. Please ask at the pharmacy. Wash hands with soap and water prior to and after every dressing change. [x]Wash wounds with: 0.9% normal saline  [x]Uzma wound Topical Treatments: Do not apply lotions, creams, or ointments to the skin around the wound bed unless directed as followed:   Apply around the wound: No-Sting barrier film         [x]Wound Location: left lower leg   Apply Primary Dressing to wound: Foam with silver (I.e. Polymem Ag)  Secondary Dressing: Extra absorbant pad   Avoid contact of tape with skin if possible. When to change Dressing: DO NOT CHANGE            [x] Multilayer Compression Wrap:  Type: Applied on Left lower leg(s)  3 Layer Compression Wrap    Do not get leg(s) with wrap wet. If wraps become too tight call the center or completely remove the wrap. Elevate leg(s) above the level of the heart when sitting. Avoid prolonged standing in one place. Applied in Clinic on 9/1/2023  The Goal of this therapy is to reduce edema and get into long term compression garments to control venous insufficiency, lymphedema and reduce occurrence of venous ulcers    [x] Edema Control:       Elevate leg(s) above the level of the heart for 30 minutes 4-5 times a day and/or when sitting. Avoid prolonged standing in one place. Dietary:  Important dietary reminders:  1. Increase Protein intake (i.e. Lean meats, fish, eggs, legumes, and yogurt)  2. No added salt  3. If diabetic, follow a diabetic diet and check glucose prior to meals or as instructed by your physician.     Dietary Supplements(Take twice a day unless instructed otherwise):  [] Linder Barbone  [] 30ml ProStat [] Ensure Complete [] Ensure Max/Premier [] Expedite [] Other:    Your nurse  is:  Lance Wooten     Electronically signed by Alex Narayanan RN

## 2023-09-05 RX ORDER — APIXABAN 5 MG/1
TABLET, FILM COATED ORAL
Qty: 180 TABLET | Refills: 3 | Status: SHIPPED | OUTPATIENT
Start: 2023-09-05 | End: 2023-09-08

## 2023-09-05 NOTE — TELEPHONE ENCOUNTER
Requested Prescriptions     Pending Prescriptions Disp Refills    ELIQUIS 5 MG TABS tablet [Pharmacy Med Name: Eliquis 5 MG Oral Tablet] 180 tablet 3     Sig: TAKE 1 TABLET TWICE A DAY            Last Office Visit: 7/13/2023     Next Office Visit: 11/9/2023

## 2023-09-08 ENCOUNTER — HOSPITAL ENCOUNTER (OUTPATIENT)
Dept: WOUND CARE | Age: 78
Discharge: HOME | End: 2023-09-08
Payer: MEDICARE

## 2023-09-08 VITALS
HEART RATE: 61 BPM | SYSTOLIC BLOOD PRESSURE: 133 MMHG | RESPIRATION RATE: 18 BRPM | DIASTOLIC BLOOD PRESSURE: 80 MMHG | TEMPERATURE: 97.7 F

## 2023-09-08 DIAGNOSIS — I87.312 STASIS EDEMA WITH ULCER OF LEFT LOWER EXTREMITY (HCC): Primary | ICD-10-CM

## 2023-09-08 DIAGNOSIS — L97.929 STASIS EDEMA WITH ULCER OF LEFT LOWER EXTREMITY (HCC): Primary | ICD-10-CM

## 2023-09-08 PROCEDURE — 29581 APPL MULTLAYER CMPRN SYS LEG: CPT

## 2023-09-08 PROCEDURE — 6370000000 HC RX 637 (ALT 250 FOR IP): Performed by: PODIATRIST

## 2023-09-08 RX ORDER — SODIUM CHLOR/HYPOCHLOROUS ACID 0.033 %
SOLUTION, IRRIGATION IRRIGATION ONCE
OUTPATIENT
Start: 2023-09-08 | End: 2023-09-08

## 2023-09-08 RX ORDER — GINSENG 100 MG
CAPSULE ORAL ONCE
OUTPATIENT
Start: 2023-09-08 | End: 2023-09-08

## 2023-09-08 RX ORDER — BACITRACIN ZINC AND POLYMYXIN B SULFATE 500; 1000 [USP'U]/G; [USP'U]/G
OINTMENT TOPICAL ONCE
OUTPATIENT
Start: 2023-09-08 | End: 2023-09-08

## 2023-09-08 RX ORDER — TRAZODONE HYDROCHLORIDE 100 MG/1
1 TABLET ORAL NIGHTLY
COMMUNITY
Start: 2023-08-14

## 2023-09-08 RX ORDER — LIDOCAINE HYDROCHLORIDE 20 MG/ML
JELLY TOPICAL ONCE
OUTPATIENT
Start: 2023-09-08 | End: 2023-09-08

## 2023-09-08 RX ORDER — CLOBETASOL PROPIONATE 0.5 MG/G
OINTMENT TOPICAL ONCE
OUTPATIENT
Start: 2023-09-08 | End: 2023-09-08

## 2023-09-08 RX ORDER — LIDOCAINE 40 MG/G
CREAM TOPICAL ONCE
OUTPATIENT
Start: 2023-09-08 | End: 2023-09-08

## 2023-09-08 RX ORDER — BETAMETHASONE DIPROPIONATE 0.05 %
OINTMENT (GRAM) TOPICAL ONCE
OUTPATIENT
Start: 2023-09-08 | End: 2023-09-08

## 2023-09-08 RX ORDER — LIDOCAINE 50 MG/G
OINTMENT TOPICAL ONCE
OUTPATIENT
Start: 2023-09-08 | End: 2023-09-08

## 2023-09-08 RX ORDER — IBUPROFEN 200 MG
TABLET ORAL ONCE
OUTPATIENT
Start: 2023-09-08 | End: 2023-09-08

## 2023-09-08 RX ORDER — LIDOCAINE HYDROCHLORIDE 40 MG/ML
SOLUTION TOPICAL ONCE
Status: COMPLETED | OUTPATIENT
Start: 2023-09-08 | End: 2023-09-08

## 2023-09-08 RX ORDER — CHLORHEXIDINE GLUCONATE 0.12 MG/ML
RINSE ORAL
COMMUNITY
Start: 2023-08-29

## 2023-09-08 RX ORDER — KETOCONAZOLE 20 MG/G
CREAM TOPICAL
COMMUNITY
Start: 2022-02-25

## 2023-09-08 RX ORDER — GENTAMICIN SULFATE 1 MG/G
OINTMENT TOPICAL ONCE
OUTPATIENT
Start: 2023-09-08 | End: 2023-09-08

## 2023-09-08 RX ORDER — LIDOCAINE HYDROCHLORIDE 40 MG/ML
SOLUTION TOPICAL ONCE
OUTPATIENT
Start: 2023-09-08 | End: 2023-09-08

## 2023-09-08 RX ADMIN — LIDOCAINE HYDROCHLORIDE: 40 SOLUTION TOPICAL at 13:54

## 2023-09-08 NOTE — PROGRESS NOTES
Multilayer Compression Wrap   (Not Unna) Below the Knee    NAME:  Ashley Barnes OF BIRTH:  1945  MEDICAL RECORD NUMBER:  [id number]  DATE:  9/8/2023       Removed old Multilayer wrap if present and washed leg with mild soap/water. Applied moisturizing agent to dry skin as needed. Applied primary and secondary dressing as ordered    Applied multilayered dressing below the knee to Left lower leg(s)  (3 Layer Compression Wrap ) . Instructed patient/caregiver not to remove dressing and to keep it clean and dry. Instructed patient/caregiver on complications to report to provider, such as pain, numbness in toes, heavy drainage, and slippage of dressing. Instructed patient on purpose of compression dressing and on activity and exercise recommendations.    Applied per  [employer] Guidelines    Electronically signed by Romero Gamez RN on 9/8/2023 at 2:08 PM
to ECF/Transportation/POA              Electronically signed by Radha Vaz DPM on 9/8/2023 at 2:27 PM

## 2023-09-08 NOTE — DISCHARGE INSTRUCTIONS
[facility] Physician Orders and Discharge Instructions  [address] [address] [address] [place] [address]. [facility]  Telephone: [phone]  NAME:  Sierra Pendleton OF BIRTH:  1945  MEDICAL RECORD NUMBER:  [id number]  DATE: 9/8/23     Return Appointment:  Return Appointment: With Cathy Albarado DPM  in  1 [facility])  [] Return Appointment for a Wound Assessment with the nurse on:     Future Appointments   Date Time Provider 4600  [address]   11/9/2023  2:45 PM Karen Tesfaye  [address]: none    Medically necessary services for evaluation and treatment: []Skilled Nursing (using clean technique) []PT (Eval & Treat) []OT (Eval & Treat) []Social Work []Dietician []Other:      Wound care instructions: If you smoke we ask that you refrain from smoking. Smoking inhibits wounds from healing. When taking antibiotics take the entire prescription as ordered. Do not stop taking until medication is all gone unless otherwise instructed. Exercise as tolerated. Keep weight off wounds and reposition every 2 hours if applicable. Do not get wounds wet in the bath or shower unless otherwise instructed by your physician. If your wound is on your foot or leg, you may purchase a cast bag. Please ask at the pharmacy. Wash hands with soap and water prior to and after every dressing change. [x]Wash wounds with: No need to wash. Leave dressing in place. [x]Uzma wound Topical Treatments: Do not apply lotions, creams, or ointments to the skin around the wound bed unless directed as followed:   Apply around the wound: Moisturizing lotion         [x]Wound Location: Left Lower Leg   Apply Primary Dressing to wound: Foam with silver (I.e. Polymem Ag)  Secondary Dressing: None   Avoid contact of tape with skin if possible.   When to change Dressing: DO NOT CHANGE        [x] Multilayer Compression Wrap:  Type: Applied on Left lower leg(s)  3

## 2023-09-15 ENCOUNTER — HOSPITAL ENCOUNTER (OUTPATIENT)
Dept: WOUND CARE | Age: 78
Discharge: HOME | End: 2023-09-15
Payer: MEDICARE

## 2023-09-15 VITALS
SYSTOLIC BLOOD PRESSURE: 160 MMHG | RESPIRATION RATE: 20 BRPM | TEMPERATURE: 96.8 F | HEART RATE: 65 BPM | DIASTOLIC BLOOD PRESSURE: 75 MMHG

## 2023-09-15 DIAGNOSIS — L97.929 STASIS EDEMA WITH ULCER OF LEFT LOWER EXTREMITY (HCC): Primary | ICD-10-CM

## 2023-09-15 DIAGNOSIS — I87.312 STASIS EDEMA WITH ULCER OF LEFT LOWER EXTREMITY (HCC): Primary | ICD-10-CM

## 2023-09-15 PROCEDURE — 6370000000 HC RX 637 (ALT 250 FOR IP): Performed by: PODIATRIST

## 2023-09-15 PROCEDURE — 11042 DBRDMT SUBQ TIS 1ST 20SQCM/<: CPT

## 2023-09-15 PROCEDURE — 29581 APPL MULTLAYER CMPRN SYS LEG: CPT

## 2023-09-15 RX ORDER — LIDOCAINE HYDROCHLORIDE 20 MG/ML
JELLY TOPICAL ONCE
OUTPATIENT
Start: 2023-09-15 | End: 2023-09-15

## 2023-09-15 RX ORDER — GENTAMICIN SULFATE 1 MG/G
OINTMENT TOPICAL ONCE
OUTPATIENT
Start: 2023-09-15 | End: 2023-09-15

## 2023-09-15 RX ORDER — BETAMETHASONE DIPROPIONATE 0.05 %
OINTMENT (GRAM) TOPICAL ONCE
OUTPATIENT
Start: 2023-09-15 | End: 2023-09-15

## 2023-09-15 RX ORDER — GINSENG 100 MG
CAPSULE ORAL ONCE
OUTPATIENT
Start: 2023-09-15 | End: 2023-09-15

## 2023-09-15 RX ORDER — LIDOCAINE HYDROCHLORIDE 40 MG/ML
SOLUTION TOPICAL ONCE
OUTPATIENT
Start: 2023-09-15 | End: 2023-09-15

## 2023-09-15 RX ORDER — CLOBETASOL PROPIONATE 0.5 MG/G
OINTMENT TOPICAL ONCE
OUTPATIENT
Start: 2023-09-15 | End: 2023-09-15

## 2023-09-15 RX ORDER — BACITRACIN ZINC AND POLYMYXIN B SULFATE 500; 1000 [USP'U]/G; [USP'U]/G
OINTMENT TOPICAL ONCE
OUTPATIENT
Start: 2023-09-15 | End: 2023-09-15

## 2023-09-15 RX ORDER — LIDOCAINE HYDROCHLORIDE 40 MG/ML
SOLUTION TOPICAL ONCE
Status: COMPLETED | OUTPATIENT
Start: 2023-09-15 | End: 2023-09-15

## 2023-09-15 RX ORDER — LIDOCAINE 50 MG/G
OINTMENT TOPICAL ONCE
OUTPATIENT
Start: 2023-09-15 | End: 2023-09-15

## 2023-09-15 RX ORDER — LIDOCAINE 40 MG/G
CREAM TOPICAL ONCE
OUTPATIENT
Start: 2023-09-15 | End: 2023-09-15

## 2023-09-15 RX ORDER — SODIUM CHLOR/HYPOCHLOROUS ACID 0.033 %
SOLUTION, IRRIGATION IRRIGATION ONCE
OUTPATIENT
Start: 2023-09-15 | End: 2023-09-15

## 2023-09-15 RX ORDER — IBUPROFEN 200 MG
TABLET ORAL ONCE
OUTPATIENT
Start: 2023-09-15 | End: 2023-09-15

## 2023-09-15 RX ADMIN — LIDOCAINE HYDROCHLORIDE: 40 SOLUTION TOPICAL at 13:53

## 2023-09-15 NOTE — PROGRESS NOTES
Roque Mendoza  Progress Note and Procedure Note      [address] [facility] RECORD NUMBER:  [id number]  AGE: 65 y.o. GENDER: male  : 1945  EPISODE DATE:  9/15/2023    Subjective:     Chief Complaint   Patient presents with    Wound Check     Left lower leg           HISTORY of PRESENT ILLNESS HPI     Sierra Robertson is a 65 y.o. male who presents today for wound/ulcer evaluation. History of Wound Context:  Very small ankle ulcer now, with greater attempts at decreased edema thru use of compression wraps  Wound/Ulcer Pain Timing/Severity: intermittent, mild  Quality of pain: dull, aching  Severity:  3 / 10   Modifying Factors: Pain worsens with debridement  Associated Signs/Symptoms: edema    Ulcer Identification:  Ulcer Type: venous    Contributing Factors: edema and venous stasis    Acute Wound: Blister        PAST MEDICAL HISTORY        Diagnosis Date    A-fib ([facility])     Diabetes mellitus ([address])     Hyperlipidemia     Hypertension     Thyroid disease        PAST SURGICAL HISTORY    Past Surgical History:   Procedure Laterality Date    ANKLE SURGERY Left     JOINT REPLACEMENT Bilateral     knee replacements    KNEE SURGERY Right        FAMILY HISTORY    Family History   Problem Relation Age of Onset    Arthritis Other     Diabetes Other        SOCIAL HISTORY    Social History     Tobacco Use    Smoking status: Never    Smokeless tobacco: Never   Vaping Use    Vaping Use: Never used   Substance Use Topics    Alcohol use: No       ALLERGIES    No Known Allergies    MEDICATIONS    Current Outpatient Medications on File Prior to Encounter   Medication Sig Dispense Refill    chlorhexidine (PERIDEX) 0.12 % solution SWISH 15 MLS (1 CAPFUL) IN MOUTH OR THROAT 2 TIMES A DAY AS DIRECTED.  SPIT - DO NOT SWALLOW.      cyanocobalamin 1000 MCG tablet Take 1 tablet by mouth daily      ketoconazole (NIZORAL) 2 % cream APPLY TOPICALLY TWO TIMES DAILY AS NEEDED      traZODone (DESYREL) 100 MG tablet Take

## 2023-09-15 NOTE — PROGRESS NOTES
Multilayer Compression Wrap   (Not Unna) Below the Knee    NAME:  Rosalva Chadwick OF BIRTH:  1945  MEDICAL RECORD NUMBER:  [id number]  DATE:  9/15/2023       Removed old Multilayer wrap if present and washed leg with mild soap/water. Applied moisturizing agent to dry skin as needed. Applied primary and secondary dressing as ordered    Applied multilayered dressing below the knee to Left lower leg(s)  (4 Layer Compression Wrap ) . Instructed patient/caregiver not to remove dressing and to keep it clean and dry. Instructed patient/caregiver on complications to report to provider, such as pain, numbness in toes, heavy drainage, and slippage of dressing. Instructed patient on purpose of compression dressing and on activity and exercise recommendations.    Applied per  [employer] Guidelines    Electronically signed by Gina Altamirano RN on 9/15/2023 at 2:23 PM

## 2023-09-15 NOTE — DISCHARGE INSTRUCTIONS
[address] [facility] Physician Orders and Discharge Instructions  [address] [address] [address] [place] [address]. [facility]  Telephone: [phone]  NAME:  Rico Bermudez OF BIRTH:  1945  MEDICAL RECORD NUMBER:  [id number]  DATE: 9/15/23     Return Appointment:  Return Appointment: With Benito Brady DPM  in  1 [facility])  [] Return Appointment for a Wound Assessment with the nurse on:     Future Appointments   Date Time Provider 4600  46[facility]   11/9/2023  2:45 PM Dhruv Reyes  [address]: none    Medically necessary services for evaluation and treatment: []Skilled Nursing (using clean technique) []PT (Eval & Treat) []OT (Eval & Treat) []Social Work []Dietician []Other:      Wound care instructions: If you smoke we ask that you refrain from smoking. Smoking inhibits wounds from healing. When taking antibiotics take the entire prescription as ordered. Do not stop taking until medication is all gone unless otherwise instructed. Exercise as tolerated. Keep weight off wounds and reposition every 2 hours if applicable. Do not get wounds wet in the bath or shower unless otherwise instructed by your physician. If your wound is on your foot or leg, you may purchase a cast bag. Please ask at the pharmacy. Wash hands with soap and water prior to and after every dressing change. [x]Wash wounds with: 0.9% normal saline  [x]Uzma wound Topical Treatments: Do not apply lotions, creams, or ointments to the skin around the wound bed unless directed as followed:   Apply around the wound: No-Sting barrier film         [x]Wound Location: left lower leg     Apply Primary Dressing to wound: Foam with silver (I.e. Polymem Ag)  Secondary Dressing: None   Avoid contact of tape with skin if possible.   When to change Dressing: DO NOT CHANGE          [x] Multilayer Compression Wrap:  Type: Applied on Left lower leg(s)  4 Layer Compression

## 2023-09-22 ENCOUNTER — HOSPITAL ENCOUNTER (OUTPATIENT)
Dept: WOUND CARE | Age: 78
Discharge: HOME | End: 2023-09-22
Payer: MEDICARE

## 2023-09-22 VITALS
RESPIRATION RATE: 22 BRPM | HEART RATE: 76 BPM | TEMPERATURE: 97.1 F | SYSTOLIC BLOOD PRESSURE: 145 MMHG | DIASTOLIC BLOOD PRESSURE: 77 MMHG

## 2023-09-22 PROCEDURE — 99212 OFFICE O/P EST SF 10 MIN: CPT

## 2023-09-22 RX ORDER — GINSENG 100 MG
CAPSULE ORAL ONCE
OUTPATIENT
Start: 2023-09-22 | End: 2023-09-22

## 2023-09-22 RX ORDER — LIDOCAINE 50 MG/G
OINTMENT TOPICAL ONCE
OUTPATIENT
Start: 2023-09-22 | End: 2023-09-22

## 2023-09-22 RX ORDER — LIDOCAINE HYDROCHLORIDE 40 MG/ML
SOLUTION TOPICAL ONCE
OUTPATIENT
Start: 2023-09-22 | End: 2023-09-22

## 2023-09-22 RX ORDER — LIDOCAINE HYDROCHLORIDE 20 MG/ML
JELLY TOPICAL ONCE
OUTPATIENT
Start: 2023-09-22 | End: 2023-09-22

## 2023-09-22 RX ORDER — IBUPROFEN 200 MG
TABLET ORAL ONCE
OUTPATIENT
Start: 2023-09-22 | End: 2023-09-22

## 2023-09-22 RX ORDER — SODIUM CHLOR/HYPOCHLOROUS ACID 0.033 %
SOLUTION, IRRIGATION IRRIGATION ONCE
OUTPATIENT
Start: 2023-09-22 | End: 2023-09-22

## 2023-09-22 RX ORDER — BETAMETHASONE DIPROPIONATE 0.05 %
OINTMENT (GRAM) TOPICAL ONCE
OUTPATIENT
Start: 2023-09-22 | End: 2023-09-22

## 2023-09-22 RX ORDER — BACITRACIN ZINC AND POLYMYXIN B SULFATE 500; 1000 [USP'U]/G; [USP'U]/G
OINTMENT TOPICAL ONCE
OUTPATIENT
Start: 2023-09-22 | End: 2023-09-22

## 2023-09-22 RX ORDER — GENTAMICIN SULFATE 1 MG/G
OINTMENT TOPICAL ONCE
OUTPATIENT
Start: 2023-09-22 | End: 2023-09-22

## 2023-09-22 RX ORDER — TRIAMCINOLONE ACETONIDE 1 MG/G
OINTMENT TOPICAL ONCE
OUTPATIENT
Start: 2023-09-22 | End: 2023-09-22

## 2023-09-22 RX ORDER — CLOBETASOL PROPIONATE 0.5 MG/G
OINTMENT TOPICAL ONCE
OUTPATIENT
Start: 2023-09-22 | End: 2023-09-22

## 2023-09-22 RX ORDER — LIDOCAINE 40 MG/G
CREAM TOPICAL ONCE
OUTPATIENT
Start: 2023-09-22 | End: 2023-09-22

## 2023-09-22 NOTE — PROGRESS NOTES
Roque Mendoza  Progress Note and Procedure Note      [address] [facility] RECORD NUMBER:  [id number]  AGE: 77 y.o. GENDER: male  : 1945  EPISODE DATE:  2023    Subjective:     Chief Complaint   Patient presents with    Wound Check         HISTORY of PRESENT ILLNESS HPI     Sharon Rogers is a 77 y.o. male who presents today for wound/ulcer evaluation. History of Wound Context: LEFT and RIGHT leg wounds sufficiently resolved that he may return to use of his personal compression stockings  Wound/Ulcer Pain Timing/Severity: none  Quality of pain: N/A  Severity:  0 / 10   Modifying Factors: None  Associated Signs/Symptoms: edema    Ulcer Identification:  Ulcer Type: venous    Contributing Factors: edema and venous stasis    Acute Wound: Blister        PAST MEDICAL HISTORY        Diagnosis Date    A-fib ([facility])     Diabetes mellitus ([address])     Hyperlipidemia     Hypertension     Thyroid disease        PAST SURGICAL HISTORY    Past Surgical History:   Procedure Laterality Date    ANKLE SURGERY Left     JOINT REPLACEMENT Bilateral     knee replacements    KNEE SURGERY Right        FAMILY HISTORY    Family History   Problem Relation Age of Onset    Arthritis Other     Diabetes Other        SOCIAL HISTORY    Social History     Tobacco Use    Smoking status: Never    Smokeless tobacco: Never   Vaping Use    Vaping Use: Never used   Substance Use Topics    Alcohol use: No       ALLERGIES    No Known Allergies    MEDICATIONS    Current Outpatient Medications on File Prior to Encounter   Medication Sig Dispense Refill    chlorhexidine (PERIDEX) 0.12 % solution SWISH 15 MLS (1 CAPFUL) IN MOUTH OR THROAT 2 TIMES A DAY AS DIRECTED.  SPIT - DO NOT SWALLOW.      cyanocobalamin 1000 MCG tablet Take 1 tablet by mouth daily      ketoconazole (NIZORAL) 2 % cream APPLY TOPICALLY TWO TIMES DAILY AS NEEDED      traZODone (DESYREL) 100 MG tablet Take 1 tablet by mouth nightly      amoxicillin (AMOXIL) 500

## 2023-09-22 NOTE — PATIENT INSTRUCTIONS
[address] [address] [address]. Derek Rose  Telephone: [phone]    NAME:  Aditya Boyer OF BIRTH:  1945  MEDICAL RECORD NUMBER:  [id number]  DATE:  9/22/2023    [facility]  Phone: [phone]  [address] [facility], [address] [place] [address]      Congratulations! You have completed your treatment program!    To prevent Venous Wounds from recurring again:  Elevate your legs at least parallel to the ground while sitting. Wear your prescription support stockings everyday and remove at night while you sleep. Replace your stockings every 3-6 months so that your legs continue to get the support they need. Inspect your legs and feet daily and report any increased swelling, unusual redness or new wounds to your physician. Wash your legs and feet regularly with mild soap and water and moisturize daily. Continue to use compression pumps if prescribed by your physician. Avoid overeating. Extra weight adds pressure on the veins in your legs. Limit salty foods as they promote swelling or fluid retention in your legs. Additional instructions for healed wound/skin care: fill order for compression stocking. Leave border dressings on until Tuesday then may remove and leave open to air. Wear compression stockings daily. May remove at night       Call the [address] [place] [address] if your wound reopens, if new wounds occur, or if you have any other questions about your follow up care [phone].      [] Patient unable to sign Discharge Instructions given to ECF/Transportation/POA    Electronically signed by Oscar Johnson RN on 9/22/2023 at 2:20 PM

## 2023-11-09 ENCOUNTER — OFFICE VISIT (OUTPATIENT)
Dept: CARDIOLOGY CLINIC | Age: 78
End: 2023-11-09
Payer: MEDICARE

## 2023-11-09 VITALS
HEART RATE: 60 BPM | WEIGHT: 315 LBS | BODY MASS INDEX: 49.65 KG/M2 | SYSTOLIC BLOOD PRESSURE: 138 MMHG | DIASTOLIC BLOOD PRESSURE: 86 MMHG

## 2023-11-09 DIAGNOSIS — I10 HTN (HYPERTENSION), BENIGN: ICD-10-CM

## 2023-11-09 DIAGNOSIS — E78.5 HYPERLIPIDEMIA, UNSPECIFIED HYPERLIPIDEMIA TYPE: Primary | ICD-10-CM

## 2023-11-09 DIAGNOSIS — I48.0 PAROXYSMAL ATRIAL FIBRILLATION (HCC): ICD-10-CM

## 2023-11-09 PROCEDURE — 99214 OFFICE O/P EST MOD 30 MIN: CPT | Performed by: INTERNAL MEDICINE

## 2023-11-09 PROCEDURE — 1123F ACP DISCUSS/DSCN MKR DOCD: CPT | Performed by: INTERNAL MEDICINE

## 2023-11-09 PROCEDURE — 3078F DIAST BP <80 MM HG: CPT | Performed by: INTERNAL MEDICINE

## 2023-11-09 PROCEDURE — 3075F SYST BP GE 130 - 139MM HG: CPT | Performed by: INTERNAL MEDICINE

## 2023-11-09 RX ORDER — CEPHALEXIN 500 MG/1
500 CAPSULE ORAL 3 TIMES DAILY
Qty: 30 CAPSULE | Refills: 0 | Status: SHIPPED | OUTPATIENT
Start: 2023-11-09 | End: 2023-11-19

## 2023-11-09 NOTE — PROGRESS NOTES
CC:  Follow up AF RVR     Subjective:    Mr. Lynder Collet feels better and has controlled HR today. Objective:   Vitals:    11/09/23 1513   BP: 138/86   Pulse:         Exam unchanged from 5/13/22    Physical Exam:  General:  Awake, alert, NAD  Eyes:  EOMI PERRL anicteric  Skin:  Warm and dry. Neck:  JVP normal  Chest:  Diminshed. Rales. Cardiovascular: reg rhythm. Normal S1S2. No Murmur. No Rub. No Gallop. Abdomen:  Soft NT  + bs  Extremities:2+ edema. Redness pretibial area left LE. Neuro:  CN II-XII intact. No focal deficits. No weakness. No lateralizing findings. Psych:  Normal thought and affect. MSK:  No Cyanosis nor Clubbing. Symmetrical strength upper and lower extremities         Plan:  HF:  Continue diuresis with torsemide 2 am and 2 in pm.  Improving. Acute diastolic HF with LVEF 68% on echo. AF RVR: seems to be in SR clinically. Continue amiodarone at 200 daily. Pt is taking eliquis  HF  Controlled presently  MPI:  No ischemia. Echo with no WMA. Cellulitis LLE pretibial area: keflex 500 mg po tid. prob has YOHANA  may get treated in 2024.     Ector Dakins, MD, MD 7/16/2021 5:17 PM

## 2023-11-16 NOTE — ED PROVIDER NOTES
Detail Level: Detailed TRIAGE CHIEF COMPLAINT:   Chief Complaint   Patient presents with    Back Pain         HPI: Rafaela Whitlock is a 75 y.o. male who presents to the Emergency Department with complaint of lower back pain. The pain has been on and off for several months. It is been more constant for the last 2 weeks and more severe for the last few days. It does not radiate down his leg. It is more left-sided but occasionally he feels it on the right side. No known injury. He has no flank pain. He wonders if he might have a urinary tract infection but has no UTI symptoms. No hematuria. He has no bowel or bladder complaint. No numbness or weakness. Denies chest pain or shortness of breath. He has no abdominal pain. No previous history of AAA. Denies fever or cough. The patient total right knee replacement February 28 of this year. He was in physical therapy but had to stop it because of some increased pain in his knee and because of the Covid-19 pandemic. No known exposure to anyone with this viral illness or any travel. Patient takes Percocet 3 times a day. He has a history of diabetes on metformin and his blood sugars have been in the 130 range. He has had previous left knee replacement and fusion of his left ankle. REVIEW OF SYSTEMS:  6 systems reviewed. Pertinent positives per HPI. Otherwise noted to be negative. Nursing notes reviewed and agree with above. Past medical/surgical history reviewed. MEDICATIONS   Patient's Medications   New Prescriptions    CYCLOBENZAPRINE (FEXMID) 7.5 MG TABLET    Take 1 tablet by mouth 3 times daily as needed for Muscle spasms    LIDOCAINE (LIDODERM) 5 %    Place 1 patch onto the skin daily for 15 days 12 hours on, 12 hours off.     METHYLPREDNISOLONE (MEDROL, DANII,) 4 MG TABLET    Take with food as directed until gone   Previous Medications    ATORVASTATIN CALCIUM PO    Take by mouth    IBUPROFEN (ADVIL;MOTRIN) 800 MG TABLET    Take 1 tablet by mouth every 8 hours as needed Field Number: 1 Lesion Dimensions-X Axis In Cm: 0.5 Shield Size In Cm: 1.0 X 1.0 Applicator Size In Cm: 1.5 cm Energy (Kv): 70 Treatment Time (Min): 0.42 Time, Dose, Fractionation Factor (Tdf) For Prescription 1: 95 Daily Dose (Cgy): 270.06 Number Of Fractions For Prescription 1: 20 Treatments Per Week: 3 Total Dose For Prescription 1 (Cgy): 5401.20 Add A Second Prescription?: No Additional Fraction(S) Needed:: 0 Bill For Physics Consultation: No - Render Text Only Physics Documentation: (Physics Check Verbiage)

## 2023-12-13 NOTE — TELEPHONE ENCOUNTER
Requested Prescriptions     Pending Prescriptions Disp Refills    torsemide (DEMADEX) 20 MG tablet [Pharmacy Med Name: Torsemide 20 MG Oral Tablet] 360 tablet 3     Sig: TAKE 2 TABLETS BY MOUTH IN THE  MORNING AND 2 TABLETS BY MOUTH  IN THE EVENING              Last Office Visit: 11/9/2023     Next Office Visit: 5/1/2024

## 2023-12-14 RX ORDER — TORSEMIDE 20 MG/1
TABLET ORAL
Qty: 360 TABLET | Refills: 3 | Status: SHIPPED | OUTPATIENT
Start: 2023-12-14

## 2024-02-15 ENCOUNTER — HOSPITAL ENCOUNTER (OUTPATIENT)
Dept: WOUND CARE | Age: 79
Discharge: HOME | End: 2024-02-15
Attending: PODIATRIST
Payer: MEDICARE

## 2024-02-15 VITALS
WEIGHT: 315 LBS | BODY MASS INDEX: 44.1 KG/M2 | HEIGHT: 71 IN | SYSTOLIC BLOOD PRESSURE: 126 MMHG | RESPIRATION RATE: 16 BRPM | TEMPERATURE: 96.9 F | HEART RATE: 72 BPM | DIASTOLIC BLOOD PRESSURE: 72 MMHG

## 2024-02-15 DIAGNOSIS — L97.222 VARICOSE VEINS OF LEFT LOWER EXTREMITY WITH INFLAMMATION, WITH ULCER OF CALF WITH FAT LAYER EXPOSED (HCC): ICD-10-CM

## 2024-02-15 DIAGNOSIS — I83.222 VARICOSE VEINS OF LEFT LOWER EXTREMITY WITH INFLAMMATION, WITH ULCER OF CALF WITH FAT LAYER EXPOSED (HCC): ICD-10-CM

## 2024-02-15 DIAGNOSIS — L97.222 NON-PRESSURE CHRONIC ULCER OF LEFT CALF WITH FAT LAYER EXPOSED (HCC): Primary | ICD-10-CM

## 2024-02-15 PROBLEM — L97.229 VARICOSE VEINS OF LEFT LOWER EXTREMITY WITH BOTH ULCER OF CALF AND INFLAMMATION (HCC): Status: ACTIVE | Noted: 2024-02-15

## 2024-02-15 PROCEDURE — 29581 APPL MULTLAYER CMPRN SYS LEG: CPT

## 2024-02-15 PROCEDURE — 99213 OFFICE O/P EST LOW 20 MIN: CPT

## 2024-02-15 PROCEDURE — 11042 DBRDMT SUBQ TIS 1ST 20SQCM/<: CPT

## 2024-02-15 RX ORDER — LIDOCAINE HYDROCHLORIDE 20 MG/ML
JELLY TOPICAL ONCE
OUTPATIENT
Start: 2024-02-15 | End: 2024-02-15

## 2024-02-15 RX ORDER — BACITRACIN ZINC AND POLYMYXIN B SULFATE 500; 1000 [USP'U]/G; [USP'U]/G
OINTMENT TOPICAL ONCE
OUTPATIENT
Start: 2024-02-15 | End: 2024-02-15

## 2024-02-15 RX ORDER — LIDOCAINE HYDROCHLORIDE 40 MG/ML
SOLUTION TOPICAL ONCE
OUTPATIENT
Start: 2024-02-15 | End: 2024-02-15

## 2024-02-15 RX ORDER — GINSENG 100 MG
CAPSULE ORAL ONCE
OUTPATIENT
Start: 2024-02-15 | End: 2024-02-15

## 2024-02-15 RX ORDER — IBUPROFEN 200 MG
TABLET ORAL ONCE
OUTPATIENT
Start: 2024-02-15 | End: 2024-02-15

## 2024-02-15 RX ORDER — LIDOCAINE 40 MG/G
CREAM TOPICAL ONCE
OUTPATIENT
Start: 2024-02-15 | End: 2024-02-15

## 2024-02-15 RX ORDER — LIDOCAINE 50 MG/G
OINTMENT TOPICAL ONCE
OUTPATIENT
Start: 2024-02-15 | End: 2024-02-15

## 2024-02-15 RX ORDER — CLOBETASOL PROPIONATE 0.5 MG/G
OINTMENT TOPICAL ONCE
OUTPATIENT
Start: 2024-02-15 | End: 2024-02-15

## 2024-02-15 RX ORDER — LEVOTHYROXINE SODIUM 0.1 MG/1
TABLET ORAL
COMMUNITY
Start: 2024-02-06

## 2024-02-15 RX ORDER — BETAMETHASONE DIPROPIONATE 0.5 MG/G
CREAM TOPICAL ONCE
OUTPATIENT
Start: 2024-02-15 | End: 2024-02-15

## 2024-02-15 RX ORDER — GENTAMICIN SULFATE 1 MG/G
OINTMENT TOPICAL ONCE
OUTPATIENT
Start: 2024-02-15 | End: 2024-02-15

## 2024-02-15 RX ORDER — TRIAMCINOLONE ACETONIDE 1 MG/G
OINTMENT TOPICAL ONCE
OUTPATIENT
Start: 2024-02-15 | End: 2024-02-15

## 2024-02-15 RX ORDER — SODIUM CHLOR/HYPOCHLOROUS ACID 0.033 %
SOLUTION, IRRIGATION IRRIGATION ONCE
OUTPATIENT
Start: 2024-02-15 | End: 2024-02-15

## 2024-02-15 RX ORDER — TIRZEPATIDE 5 MG/.5ML
5 INJECTION, SOLUTION SUBCUTANEOUS WEEKLY
COMMUNITY

## 2024-02-15 NOTE — PROGRESS NOTES
[facility]  Progress Note and Procedure Note      Kael Talbot  MEDICAL RECORD NUMBER:  [id number]  AGE: 78 y.o.   GENDER: male  : 1945  EPISODE DATE:  2/15/2024    Subjective:     Chief Complaint   Patient presents with    Wound Check     initial         HISTORY of PRESENT ILLNESS HPI     Kael Talbot is a 78 y.o. male who presents today for wound/ulcer evaluation.   History of Wound Context: Patient presents today with an ulcer on the anterior left leg.  Patient relates he has had issues with swelling and is currently on a diuretic.  He does have renal issues and his physician is trying to decrease his dosage.  Patient relates he bumped his left leg and it is not healing and it leaking fluid.  Wound/Ulcer Pain Timing/Severity: intermittent, mild  Quality of pain: dull  Severity:  2 / 10   Modifying Factors: Pain worsens with walking  Associated Signs/Symptoms: edema    Ulcer Identification:  Ulcer Type: venous    Contributing Factors: edema and venous stasis    Acute Wound: N/A not an acute wound        PAST MEDICAL HISTORY        Diagnosis Date    A-fib (HCC)     Diabetes mellitus (HCC)     Hyperlipidemia     Hypertension     Thyroid disease        PAST SURGICAL HISTORY    Past Surgical History:   Procedure Laterality Date    ANKLE SURGERY Left     JOINT REPLACEMENT Bilateral     knee replacements    KNEE SURGERY Right        FAMILY HISTORY    Family History   Problem Relation Age of Onset    Arthritis Other     Diabetes Other        SOCIAL HISTORY    Social History     Tobacco Use    Smoking status: Never    Smokeless tobacco: Never   Vaping Use    Vaping Use: Never used   Substance Use Topics    Alcohol use: No       ALLERGIES    No Known Allergies    MEDICATIONS    Current Outpatient Medications on File Prior to Encounter   Medication Sig Dispense Refill    levothyroxine (SYNTHROID) 100 MCG tablet       MOUNJARO 5 MG/0.5ML SOPN SC injection Inject 0.5 mLs into the skin once a

## 2024-02-15 NOTE — PATIENT INSTRUCTIONS
[facility]  Patient Instructions and Physician Orders  [address]  Telephone: [phone] FAX [phone]    NAME:  Kael Talbot  YOB: 1945  DATE: 2/15/24     Return Appointment:  Return Appointment: With Blaze Olivares DPM  in  1 Week(s)  [] Return Appointment for a Wound Assessment with the nurse on:     Future Appointments   Date Time Provider Department Center   5/1/2024  1:00 PM Ruy Thomas MD [facility]         No orders of the defined types were placed in this encounter.      Home Care Company: none    Medically necessary services for evaluation and treatment: []Skilled Nursing (using clean technique) []PT (Eval & Treat) []OT (Eval & Treat) []Social Work []Dietician []Other:      Wound care instructions:  If you smoke we ask that you refrain from smoking. Smoking inhibits wounds from healing.  When taking antibiotics take the entire prescription as ordered. Do not stop taking until medication is all gone unless otherwise instructed.   Exercise as tolerated.   Keep weight off wounds and reposition every 2 hours if applicable.  Do not get wounds wet in the bath or shower unless otherwise instructed by your physician. If your wound is on your foot or leg, you may purchase a cast bag. Please ask at the pharmacy.  Wash hands with soap and water prior to and after every dressing change.    [x]Wash wounds with: No need to wash. Leave dressing in place.    [x]Uzma wound Topical Treatments: Do not apply lotions, creams, or ointments to the skin around the wound bed unless directed as followed:   Apply around the wound: Moisturizing lotion         [x]Wound Location: Left Lower Leg   Apply Primary Dressing to wound: Hydrofiber with silver (ie. Opticell Ag, Aquacel Ag)  Secondary Dressing: 4X4 gauze pad   Avoid contact of tape with skin if possible.  When to change Dressing: DO NOT CHANGE        [x] Multilayer Compression Wrap:  Type: Applied on

## 2024-02-15 NOTE — PROGRESS NOTES
Multilayer Compression Wrap   (Not Unna) Below the Knee    NAME:  Kael Talbot  YOB: 1945  MEDICAL RECORD NUMBER:  [id number]  DATE:  2/15/2024       Removed old Multilayer wrap if present and washed leg with mild soap/water.   Applied moisturizing agent to dry skin as needed.    Applied primary and secondary dressing as ordered    Applied multilayered dressing below the knee to Left lower leg(s)  (3 Layer Compression Wrap ) .    Instructed patient/caregiver not to remove dressing and to keep it clean and dry.    Instructed patient/caregiver on complications to report to provider, such as pain, numbness in toes, heavy drainage, and slippage of dressing.    Instructed patient on purpose of compression dressing and on activity and exercise recommendations.   Applied per   Guidelines    Electronically signed by Isatu Chong RN on 2/15/2024 at 3:07 PM

## 2024-02-23 ENCOUNTER — HOSPITAL ENCOUNTER (OUTPATIENT)
Dept: WOUND CARE | Age: 79
Discharge: HOME | End: 2024-02-23
Attending: PODIATRIST
Payer: MEDICARE

## 2024-02-23 VITALS
HEART RATE: 87 BPM | TEMPERATURE: 97.3 F | RESPIRATION RATE: 16 BRPM | SYSTOLIC BLOOD PRESSURE: 138 MMHG | DIASTOLIC BLOOD PRESSURE: 69 MMHG

## 2024-02-23 DIAGNOSIS — I83.222 VARICOSE VEINS OF LEFT LOWER EXTREMITY WITH INFLAMMATION, WITH ULCER OF CALF WITH FAT LAYER EXPOSED (HCC): ICD-10-CM

## 2024-02-23 DIAGNOSIS — L97.222 NON-PRESSURE CHRONIC ULCER OF LEFT CALF WITH FAT LAYER EXPOSED (HCC): Primary | ICD-10-CM

## 2024-02-23 DIAGNOSIS — L97.222 VARICOSE VEINS OF LEFT LOWER EXTREMITY WITH INFLAMMATION, WITH ULCER OF CALF WITH FAT LAYER EXPOSED (HCC): ICD-10-CM

## 2024-02-23 PROCEDURE — 29581 APPL MULTLAYER CMPRN SYS LEG: CPT

## 2024-02-23 PROCEDURE — 6370000000 HC RX 637 (ALT 250 FOR IP): Performed by: PODIATRIST

## 2024-02-23 PROCEDURE — 99213 OFFICE O/P EST LOW 20 MIN: CPT | Performed by: SPECIALIST

## 2024-02-23 RX ORDER — CLOBETASOL PROPIONATE 0.5 MG/G
OINTMENT TOPICAL ONCE
OUTPATIENT
Start: 2024-02-23 | End: 2024-02-23

## 2024-02-23 RX ORDER — BACITRACIN ZINC AND POLYMYXIN B SULFATE 500; 1000 [USP'U]/G; [USP'U]/G
OINTMENT TOPICAL ONCE
OUTPATIENT
Start: 2024-02-23 | End: 2024-02-23

## 2024-02-23 RX ORDER — TRIAMCINOLONE ACETONIDE 1 MG/G
OINTMENT TOPICAL ONCE
OUTPATIENT
Start: 2024-02-23 | End: 2024-02-23

## 2024-02-23 RX ORDER — SODIUM CHLOR/HYPOCHLOROUS ACID 0.033 %
SOLUTION, IRRIGATION IRRIGATION ONCE
OUTPATIENT
Start: 2024-02-23 | End: 2024-02-23

## 2024-02-23 RX ORDER — GENTAMICIN SULFATE 1 MG/G
OINTMENT TOPICAL ONCE
OUTPATIENT
Start: 2024-02-23 | End: 2024-02-23

## 2024-02-23 RX ORDER — LIDOCAINE HYDROCHLORIDE 20 MG/ML
JELLY TOPICAL ONCE
OUTPATIENT
Start: 2024-02-23 | End: 2024-02-23

## 2024-02-23 RX ORDER — LIDOCAINE 40 MG/G
CREAM TOPICAL ONCE
OUTPATIENT
Start: 2024-02-23 | End: 2024-02-23

## 2024-02-23 RX ORDER — LIDOCAINE HYDROCHLORIDE 40 MG/ML
SOLUTION TOPICAL ONCE
OUTPATIENT
Start: 2024-02-23 | End: 2024-02-23

## 2024-02-23 RX ORDER — GINSENG 100 MG
CAPSULE ORAL ONCE
OUTPATIENT
Start: 2024-02-23 | End: 2024-02-23

## 2024-02-23 RX ORDER — LIDOCAINE 50 MG/G
OINTMENT TOPICAL ONCE
OUTPATIENT
Start: 2024-02-23 | End: 2024-02-23

## 2024-02-23 RX ORDER — IBUPROFEN 200 MG
TABLET ORAL ONCE
OUTPATIENT
Start: 2024-02-23 | End: 2024-02-23

## 2024-02-23 RX ORDER — BETAMETHASONE DIPROPIONATE 0.5 MG/G
CREAM TOPICAL ONCE
OUTPATIENT
Start: 2024-02-23 | End: 2024-02-23

## 2024-02-23 RX ORDER — LIDOCAINE HYDROCHLORIDE 40 MG/ML
SOLUTION TOPICAL ONCE
Status: COMPLETED | OUTPATIENT
Start: 2024-02-23 | End: 2024-02-23

## 2024-02-23 RX ADMIN — LIDOCAINE HYDROCHLORIDE: 40 SOLUTION TOPICAL at 10:09

## 2024-02-23 NOTE — PATIENT INSTRUCTIONS
possible.  When to change Dressing: DO NOT CHANGE        [x] Multilayer Compression Wrap:  Type: Applied on Bilateral lower leg(s)  3 Layer Compression Wrap    Do not get leg(s) with wrap wet.  If wraps become too tight call the center or completely remove the wrap.   Elevate leg(s) above the level of the heart when sitting.  Avoid prolonged standing in one place.   Applied in Clinic on 2/23/2024  The Goal of this therapy is to reduce edema and get into long term compression garments to control venous insufficiency, lymphedema and reduce occurrence of venous ulcers        Dietary:  Important dietary reminders:  1. Increase Protein intake (i.e. Lean meats, fish, eggs, legumes, and yogurt)  2. No added salt  3. If diabetic, follow a diabetic diet and check glucose prior to meals or as instructed by your physician.    Dietary Supplements(Take twice a day unless instructed otherwise):  [] Justin  [] 30ml ProStat [] Ensure Complete [] Ensure Max/Premier [] Expedite [] Other:    Your nurse  is:  Isatu     Electronically signed by Isatu Chong RN on 2/23/2024 at 10:38 AM     Wound Care Center Information: Should you experience any significant changes in your wound(s) or have questions about your wound care, please contact the [facility] at [phone].   Hours of operation:  Mon:  8AM - 2PM  Tue: 11AM - 5PM  Wed: CLOSED  Thur: 8AM - 4:30PM  Fri:  8AM - 4:30PM  The office is closed on all major holidays.    Please give us 24-48 business hours to return your call.  These hours of operation are subject to change. If you need help with your wounds and cannot wait until we are available, contact your PCP or go to your preferred emergency room.     Call your doctor now or seek immediate medical care if:    You have symptoms of infection, such as:  Increased pain, swelling, warmth, or redness.  Red streaks leading from the area.  Pus draining from the area.  A fever.

## 2024-02-23 NOTE — PROGRESS NOTES
Multilayer Compression Wrap   (Not Unna) Below the Knee    NAME:  Kael Talbot  YOB: 1945  MEDICAL RECORD NUMBER:  [id number]  DATE:  2/23/2024       Removed old Multilayer wrap if present and washed leg with mild soap/water.   Applied moisturizing agent to dry skin as needed.    Applied primary and secondary dressing as ordered    Applied multilayered dressing below the knee to Bilateral lower leg(s)  (3 Layer Compression Wrap ) .    Instructed patient/caregiver not to remove dressing and to keep it clean and dry.    Instructed patient/caregiver on complications to report to provider, such as pain, numbness in toes, heavy drainage, and slippage of dressing.    Instructed patient on purpose of compression dressing and on activity and exercise recommendations.   Applied per   Guidelines    Electronically signed by Isatu Chong RN on 2/23/2024 at 11:06 AM

## 2024-02-23 NOTE — PROGRESS NOTES
[facility]  Progress Note and Procedure Note      Kael Talbot  AGE: 78 y.o.   GENDER: male  : 1945  EPISODE DATE:  2024      Subjective:     Chief Complaint   Patient presents with    Wound Check         HISTORY of PRESENT ILLNESS HPI     Kael Talbot is a 78 y.o. male who presents today for wound evaluation.   History of Wound Context: Patient of Dr. Olivares being followed for ulcer left leg.  Patient now describes wound right lower extremity with increased swelling.  Wound Pain Timing/Severity: intermittent  Quality of pain: dull  Severity:  2 / 10   Modifying Factors: Pain worsens with walking  Associated Signs/Symptoms: edema    Wound Identification:  Wound Type: venous  Contributing Factors: edema, venous stasis, and decreased mobility        PAST MEDICAL HISTORY        Diagnosis Date    A-fib (HCC)     Diabetes mellitus (HCC)     Hyperlipidemia     Hypertension     Thyroid disease        PAST SURGICAL HISTORY    Past Surgical History:   Procedure Laterality Date    ANKLE SURGERY Left     JOINT REPLACEMENT Bilateral     knee replacements    KNEE SURGERY Right        FAMILY HISTORY    Family History   Problem Relation Age of Onset    Arthritis Other     Diabetes Other        SOCIAL HISTORY    Social History     Tobacco Use    Smoking status: Never    Smokeless tobacco: Never   Vaping Use    Vaping Use: Never used   Substance Use Topics    Alcohol use: No       ALLERGIES    No Known Allergies    MEDICATIONS    Current Outpatient Medications on File Prior to Encounter   Medication Sig Dispense Refill    MOUNJARO 5 MG/0.5ML SOPN SC injection Inject 0.5 mLs into the skin once a week On       levothyroxine (SYNTHROID) 100 MCG tablet       torsemide (DEMADEX) 20 MG tablet TAKE 2 TABLETS BY MOUTH IN THE  MORNING AND 2 TABLETS BY MOUTH  IN THE EVENING 360 tablet 3    Compression Stockings MISC by Does not apply route Strength 30-40mmHg  Style: Other velcro or pull on of

## 2024-02-29 ENCOUNTER — HOSPITAL ENCOUNTER (OUTPATIENT)
Dept: WOUND CARE | Age: 79
Discharge: HOME | End: 2024-02-29
Attending: PODIATRIST
Payer: MEDICARE

## 2024-02-29 VITALS
DIASTOLIC BLOOD PRESSURE: 80 MMHG | RESPIRATION RATE: 16 BRPM | TEMPERATURE: 97.8 F | HEART RATE: 71 BPM | SYSTOLIC BLOOD PRESSURE: 126 MMHG

## 2024-02-29 DIAGNOSIS — L97.212 NON-PRESSURE CHRONIC ULCER OF RIGHT CALF WITH FAT LAYER EXPOSED (HCC): ICD-10-CM

## 2024-02-29 DIAGNOSIS — L97.929 IDIOPATHIC CHRONIC VENOUS HYPERTENSION OF BOTH LOWER EXTREMITIES WITH ULCER (HCC): ICD-10-CM

## 2024-02-29 DIAGNOSIS — L97.929 VARICOSE VEINS OF LOWER EXTREMITIES WITH ULCER AND INFLAMMATION (HCC): ICD-10-CM

## 2024-02-29 DIAGNOSIS — L97.919 VARICOSE VEINS OF LOWER EXTREMITIES WITH ULCER AND INFLAMMATION (HCC): ICD-10-CM

## 2024-02-29 DIAGNOSIS — I83.229 VARICOSE VEINS OF LOWER EXTREMITIES WITH ULCER AND INFLAMMATION (HCC): ICD-10-CM

## 2024-02-29 DIAGNOSIS — L97.222 NON-PRESSURE CHRONIC ULCER OF LEFT CALF WITH FAT LAYER EXPOSED (HCC): Primary | ICD-10-CM

## 2024-02-29 DIAGNOSIS — I83.219 VARICOSE VEINS OF LOWER EXTREMITIES WITH ULCER AND INFLAMMATION (HCC): ICD-10-CM

## 2024-02-29 DIAGNOSIS — I87.313 IDIOPATHIC CHRONIC VENOUS HYPERTENSION OF BOTH LOWER EXTREMITIES WITH ULCER (HCC): ICD-10-CM

## 2024-02-29 DIAGNOSIS — L97.222 VARICOSE VEINS OF LEFT LOWER EXTREMITY WITH INFLAMMATION, WITH ULCER OF CALF WITH FAT LAYER EXPOSED (HCC): ICD-10-CM

## 2024-02-29 DIAGNOSIS — L97.919 IDIOPATHIC CHRONIC VENOUS HYPERTENSION OF BOTH LOWER EXTREMITIES WITH ULCER (HCC): ICD-10-CM

## 2024-02-29 DIAGNOSIS — I83.222 VARICOSE VEINS OF LEFT LOWER EXTREMITY WITH INFLAMMATION, WITH ULCER OF CALF WITH FAT LAYER EXPOSED (HCC): ICD-10-CM

## 2024-02-29 PROCEDURE — 6370000000 HC RX 637 (ALT 250 FOR IP): Performed by: PODIATRIST

## 2024-02-29 PROCEDURE — 11042 DBRDMT SUBQ TIS 1ST 20SQCM/<: CPT

## 2024-02-29 PROCEDURE — 29581 APPL MULTLAYER CMPRN SYS LEG: CPT

## 2024-02-29 RX ORDER — LIDOCAINE HYDROCHLORIDE 20 MG/ML
JELLY TOPICAL ONCE
OUTPATIENT
Start: 2024-02-29 | End: 2024-02-29

## 2024-02-29 RX ORDER — LIDOCAINE 40 MG/G
CREAM TOPICAL ONCE
OUTPATIENT
Start: 2024-02-29 | End: 2024-02-29

## 2024-02-29 RX ORDER — LIDOCAINE HYDROCHLORIDE 40 MG/ML
SOLUTION TOPICAL ONCE
OUTPATIENT
Start: 2024-02-29 | End: 2024-02-29

## 2024-02-29 RX ORDER — BETAMETHASONE DIPROPIONATE 0.5 MG/G
CREAM TOPICAL ONCE
OUTPATIENT
Start: 2024-02-29 | End: 2024-02-29

## 2024-02-29 RX ORDER — IBUPROFEN 200 MG
TABLET ORAL ONCE
OUTPATIENT
Start: 2024-02-29 | End: 2024-02-29

## 2024-02-29 RX ORDER — GINSENG 100 MG
CAPSULE ORAL ONCE
OUTPATIENT
Start: 2024-02-29 | End: 2024-02-29

## 2024-02-29 RX ORDER — CLOBETASOL PROPIONATE 0.5 MG/G
OINTMENT TOPICAL ONCE
OUTPATIENT
Start: 2024-02-29 | End: 2024-02-29

## 2024-02-29 RX ORDER — LIDOCAINE 50 MG/G
OINTMENT TOPICAL ONCE
OUTPATIENT
Start: 2024-02-29 | End: 2024-02-29

## 2024-02-29 RX ORDER — GENTAMICIN SULFATE 1 MG/G
OINTMENT TOPICAL ONCE
OUTPATIENT
Start: 2024-02-29 | End: 2024-02-29

## 2024-02-29 RX ORDER — LIDOCAINE HYDROCHLORIDE 40 MG/ML
SOLUTION TOPICAL ONCE
Status: COMPLETED | OUTPATIENT
Start: 2024-02-29 | End: 2024-02-29

## 2024-02-29 RX ORDER — TRIAMCINOLONE ACETONIDE 1 MG/G
OINTMENT TOPICAL ONCE
OUTPATIENT
Start: 2024-02-29 | End: 2024-02-29

## 2024-02-29 RX ORDER — SODIUM CHLOR/HYPOCHLOROUS ACID 0.033 %
SOLUTION, IRRIGATION IRRIGATION ONCE
OUTPATIENT
Start: 2024-02-29 | End: 2024-02-29

## 2024-02-29 RX ORDER — BACITRACIN ZINC AND POLYMYXIN B SULFATE 500; 1000 [USP'U]/G; [USP'U]/G
OINTMENT TOPICAL ONCE
OUTPATIENT
Start: 2024-02-29 | End: 2024-02-29

## 2024-02-29 RX ADMIN — LIDOCAINE HYDROCHLORIDE: 40 SOLUTION TOPICAL at 14:34

## 2024-02-29 NOTE — PROGRESS NOTES
[facility]  Progress Note and Procedure Note      Kael Talbot  MEDICAL RECORD NUMBER:  [id number]  AGE: 78 y.o.   GENDER: male  : 1945  EPISODE DATE:  2024    Subjective:     Chief Complaint   Patient presents with    Wound Check     Left and right lower legs         HISTORY of PRESENT ILLNESS HPI     Kael Talbot is a 78 y.o. male who presents today for wound/ulcer evaluation.   History of Wound Context: Patient presents today with an ulcer on the anterior left leg.  Patient relates he has issues with transportation and could not make his appointment last week.  Patient relates he had issues with his compression wraps falling down.  Wound/Ulcer Pain Timing/Severity: intermittent, mild  Quality of pain: dull  Severity:  2 / 10   Modifying Factors: Pain worsens with walking  Associated Signs/Symptoms: edema    Ulcer Identification:  Ulcer Type: venous    Contributing Factors: edema and venous stasis    Acute Wound: N/A not an acute wound        PAST MEDICAL HISTORY        Diagnosis Date    A-fib (HCC)     Diabetes mellitus (HCC)     Hyperlipidemia     Hypertension     Thyroid disease        PAST SURGICAL HISTORY    Past Surgical History:   Procedure Laterality Date    ANKLE SURGERY Left     JOINT REPLACEMENT Bilateral     knee replacements    KNEE SURGERY Right        FAMILY HISTORY    Family History   Problem Relation Age of Onset    Arthritis Other     Diabetes Other        SOCIAL HISTORY    Social History     Tobacco Use    Smoking status: Never    Smokeless tobacco: Never   Vaping Use    Vaping Use: Never used   Substance Use Topics    Alcohol use: No       ALLERGIES    No Known Allergies    MEDICATIONS    Current Outpatient Medications on File Prior to Encounter   Medication Sig Dispense Refill    MOUNJARO 5 MG/0.5ML SOPN SC injection Inject 0.5 mLs into the skin once a week On       levothyroxine (SYNTHROID) 100 MCG tablet       torsemide (DEMADEX) 20 MG tablet

## 2024-03-07 ENCOUNTER — HOSPITAL ENCOUNTER (OUTPATIENT)
Dept: WOUND CARE | Age: 79
Discharge: HOME | End: 2024-03-07
Attending: PODIATRIST

## 2024-03-14 ENCOUNTER — HOSPITAL ENCOUNTER (OUTPATIENT)
Dept: WOUND CARE | Age: 79
Discharge: HOME | End: 2024-03-14
Attending: PODIATRIST
Payer: MEDICARE

## 2024-03-14 VITALS
RESPIRATION RATE: 16 BRPM | SYSTOLIC BLOOD PRESSURE: 165 MMHG | DIASTOLIC BLOOD PRESSURE: 80 MMHG | HEART RATE: 72 BPM | TEMPERATURE: 96.9 F

## 2024-03-14 DIAGNOSIS — L97.212 NON-PRESSURE CHRONIC ULCER OF RIGHT CALF WITH FAT LAYER EXPOSED (HCC): ICD-10-CM

## 2024-03-14 DIAGNOSIS — I83.229 VARICOSE VEINS OF LOWER EXTREMITIES WITH ULCER AND INFLAMMATION (HCC): ICD-10-CM

## 2024-03-14 DIAGNOSIS — L97.222 NON-PRESSURE CHRONIC ULCER OF LEFT CALF WITH FAT LAYER EXPOSED (HCC): Primary | ICD-10-CM

## 2024-03-14 DIAGNOSIS — L97.929 IDIOPATHIC CHRONIC VENOUS HYPERTENSION OF BOTH LOWER EXTREMITIES WITH ULCER (HCC): ICD-10-CM

## 2024-03-14 DIAGNOSIS — I87.313 IDIOPATHIC CHRONIC VENOUS HYPERTENSION OF BOTH LOWER EXTREMITIES WITH ULCER (HCC): ICD-10-CM

## 2024-03-14 DIAGNOSIS — L97.919 VARICOSE VEINS OF LOWER EXTREMITIES WITH ULCER AND INFLAMMATION (HCC): ICD-10-CM

## 2024-03-14 DIAGNOSIS — I83.219 VARICOSE VEINS OF LOWER EXTREMITIES WITH ULCER AND INFLAMMATION (HCC): ICD-10-CM

## 2024-03-14 DIAGNOSIS — L97.929 VARICOSE VEINS OF LOWER EXTREMITIES WITH ULCER AND INFLAMMATION (HCC): ICD-10-CM

## 2024-03-14 DIAGNOSIS — I83.222 VARICOSE VEINS OF LEFT LOWER EXTREMITY WITH INFLAMMATION, WITH ULCER OF CALF WITH FAT LAYER EXPOSED (HCC): ICD-10-CM

## 2024-03-14 DIAGNOSIS — L97.919 IDIOPATHIC CHRONIC VENOUS HYPERTENSION OF BOTH LOWER EXTREMITIES WITH ULCER (HCC): ICD-10-CM

## 2024-03-14 DIAGNOSIS — L97.222 VARICOSE VEINS OF LEFT LOWER EXTREMITY WITH INFLAMMATION, WITH ULCER OF CALF WITH FAT LAYER EXPOSED (HCC): ICD-10-CM

## 2024-03-14 DIAGNOSIS — B35.1 DERMATOPHYTOSIS OF NAIL: ICD-10-CM

## 2024-03-14 PROCEDURE — 11042 DBRDMT SUBQ TIS 1ST 20SQCM/<: CPT

## 2024-03-14 PROCEDURE — 29581 APPL MULTLAYER CMPRN SYS LEG: CPT

## 2024-03-14 PROCEDURE — 11721 DEBRIDE NAIL 6 OR MORE: CPT

## 2024-03-14 PROCEDURE — 6370000000 HC RX 637 (ALT 250 FOR IP): Performed by: PODIATRIST

## 2024-03-14 RX ORDER — TRIAMCINOLONE ACETONIDE 1 MG/G
OINTMENT TOPICAL ONCE
OUTPATIENT
Start: 2024-03-14 | End: 2024-03-14

## 2024-03-14 RX ORDER — IBUPROFEN 200 MG
TABLET ORAL ONCE
OUTPATIENT
Start: 2024-03-14 | End: 2024-03-14

## 2024-03-14 RX ORDER — LIDOCAINE HYDROCHLORIDE 20 MG/ML
JELLY TOPICAL ONCE
OUTPATIENT
Start: 2024-03-14 | End: 2024-03-14

## 2024-03-14 RX ORDER — BETAMETHASONE DIPROPIONATE 0.5 MG/G
CREAM TOPICAL ONCE
OUTPATIENT
Start: 2024-03-14 | End: 2024-03-14

## 2024-03-14 RX ORDER — LIDOCAINE 50 MG/G
OINTMENT TOPICAL ONCE
OUTPATIENT
Start: 2024-03-14 | End: 2024-03-14

## 2024-03-14 RX ORDER — GENTAMICIN SULFATE 1 MG/G
OINTMENT TOPICAL ONCE
OUTPATIENT
Start: 2024-03-14 | End: 2024-03-14

## 2024-03-14 RX ORDER — CLOBETASOL PROPIONATE 0.5 MG/G
OINTMENT TOPICAL ONCE
OUTPATIENT
Start: 2024-03-14 | End: 2024-03-14

## 2024-03-14 RX ORDER — LIDOCAINE HYDROCHLORIDE 40 MG/ML
SOLUTION TOPICAL ONCE
Status: COMPLETED | OUTPATIENT
Start: 2024-03-14 | End: 2024-03-14

## 2024-03-14 RX ORDER — LIDOCAINE 40 MG/G
CREAM TOPICAL ONCE
OUTPATIENT
Start: 2024-03-14 | End: 2024-03-14

## 2024-03-14 RX ORDER — TIRZEPATIDE 7.5 MG/.5ML
7.5 INJECTION, SOLUTION SUBCUTANEOUS WEEKLY
COMMUNITY
Start: 2024-02-19

## 2024-03-14 RX ORDER — GINSENG 100 MG
CAPSULE ORAL ONCE
OUTPATIENT
Start: 2024-03-14 | End: 2024-03-14

## 2024-03-14 RX ORDER — SODIUM CHLOR/HYPOCHLOROUS ACID 0.033 %
SOLUTION, IRRIGATION IRRIGATION ONCE
OUTPATIENT
Start: 2024-03-14 | End: 2024-03-14

## 2024-03-14 RX ORDER — BACITRACIN ZINC AND POLYMYXIN B SULFATE 500; 1000 [USP'U]/G; [USP'U]/G
OINTMENT TOPICAL ONCE
OUTPATIENT
Start: 2024-03-14 | End: 2024-03-14

## 2024-03-14 RX ORDER — LIDOCAINE HYDROCHLORIDE 40 MG/ML
SOLUTION TOPICAL ONCE
OUTPATIENT
Start: 2024-03-14 | End: 2024-03-14

## 2024-03-14 RX ADMIN — LIDOCAINE HYDROCHLORIDE: 40 SOLUTION TOPICAL at 14:45

## 2024-03-14 ASSESSMENT — PAIN SCALES - GENERAL: PAINLEVEL_OUTOF10: 0

## 2024-03-14 NOTE — PROGRESS NOTES
Multilayer Compression Wrap   (Not Unna) Below the Knee    NAME:  Kael Talbot  YOB: 1945  MEDICAL RECORD NUMBER:  [id number]  DATE:  3/14/2024       Removed old Multilayer wrap if present and washed leg with mild soap/water.   Applied moisturizing agent to dry skin as needed.    Applied primary and secondary dressing as ordered    Applied multilayered dressing below the knee to Right lower leg(s)  (2 Layer compression wrap ) .    Instructed patient/caregiver not to remove dressing and to keep it clean and dry.    Instructed patient/caregiver on complications to report to provider, such as pain, numbness in toes, heavy drainage, and slippage of dressing.    Instructed patient on purpose of compression dressing and on activity and exercise recommendations.   Applied per   Guidelines    Electronically signed by Val Avila RN on 3/14/2024 at 3:49 PM    
contact the [facility] at [phone].   Hours of operation:  Mon:  8AM - 2PM  Tue: 11AM - 5PM  Wed: CLOSED  Thur: 8AM - 4:30PM  Fri:  8AM - 4:30PM  The office is closed on all major holidays.    Please give us 24-48 business hours to return your call.  These hours of operation are subject to change. If you need help with your wounds and cannot wait until we are available, contact your PCP or go to your preferred emergency room.     Call your doctor now or seek immediate medical care if:    You have symptoms of infection, such as:  Increased pain, swelling, warmth, or redness.  Red streaks leading from the area.  Pus draining from the area.  A fever.             Electronically signed by Blaze Olivares DPM on 3/14/2024 at 3:45 PM

## 2024-03-14 NOTE — PATIENT INSTRUCTIONS
you need help with your wounds and cannot wait until we are available, contact your PCP or go to your preferred emergency room.     Call your doctor now or seek immediate medical care if:    You have symptoms of infection, such as:  Increased pain, swelling, warmth, or redness.  Red streaks leading from the area.  Pus draining from the area.  A fever.

## 2024-03-21 ENCOUNTER — HOSPITAL ENCOUNTER (OUTPATIENT)
Dept: WOUND CARE | Age: 79
Discharge: HOME | End: 2024-03-21
Attending: PODIATRIST
Payer: MEDICARE

## 2024-03-21 VITALS
TEMPERATURE: 97.7 F | DIASTOLIC BLOOD PRESSURE: 78 MMHG | WEIGHT: 315 LBS | SYSTOLIC BLOOD PRESSURE: 143 MMHG | BODY MASS INDEX: 47.47 KG/M2 | HEART RATE: 61 BPM | RESPIRATION RATE: 20 BRPM

## 2024-03-21 DIAGNOSIS — I83.222 VARICOSE VEINS OF LEFT LOWER EXTREMITY WITH INFLAMMATION, WITH ULCER OF CALF WITH FAT LAYER EXPOSED (HCC): ICD-10-CM

## 2024-03-21 DIAGNOSIS — L97.222 VARICOSE VEINS OF LEFT LOWER EXTREMITY WITH INFLAMMATION, WITH ULCER OF CALF WITH FAT LAYER EXPOSED (HCC): ICD-10-CM

## 2024-03-21 DIAGNOSIS — L97.222 NON-PRESSURE CHRONIC ULCER OF LEFT CALF WITH FAT LAYER EXPOSED (HCC): Primary | ICD-10-CM

## 2024-03-21 PROCEDURE — 6370000000 HC RX 637 (ALT 250 FOR IP): Performed by: PODIATRIST

## 2024-03-21 PROCEDURE — 29581 APPL MULTLAYER CMPRN SYS LEG: CPT

## 2024-03-21 PROCEDURE — 11042 DBRDMT SUBQ TIS 1ST 20SQCM/<: CPT

## 2024-03-21 RX ORDER — GINSENG 100 MG
CAPSULE ORAL ONCE
OUTPATIENT
Start: 2024-03-21 | End: 2024-03-21

## 2024-03-21 RX ORDER — SODIUM CHLOR/HYPOCHLOROUS ACID 0.033 %
SOLUTION, IRRIGATION IRRIGATION ONCE
OUTPATIENT
Start: 2024-03-21 | End: 2024-03-21

## 2024-03-21 RX ORDER — BACITRACIN ZINC AND POLYMYXIN B SULFATE 500; 1000 [USP'U]/G; [USP'U]/G
OINTMENT TOPICAL ONCE
OUTPATIENT
Start: 2024-03-21 | End: 2024-03-21

## 2024-03-21 RX ORDER — TRIAMCINOLONE ACETONIDE 1 MG/G
OINTMENT TOPICAL ONCE
OUTPATIENT
Start: 2024-03-21 | End: 2024-03-21

## 2024-03-21 RX ORDER — LIDOCAINE HYDROCHLORIDE 20 MG/ML
JELLY TOPICAL ONCE
OUTPATIENT
Start: 2024-03-21 | End: 2024-03-21

## 2024-03-21 RX ORDER — SODIUM CHLOR/HYPOCHLOROUS ACID 0.033 %
SOLUTION, IRRIGATION IRRIGATION ONCE
Status: COMPLETED | OUTPATIENT
Start: 2024-03-21 | End: 2024-03-21

## 2024-03-21 RX ORDER — IBUPROFEN 200 MG
TABLET ORAL ONCE
OUTPATIENT
Start: 2024-03-21 | End: 2024-03-21

## 2024-03-21 RX ORDER — LIDOCAINE 50 MG/G
OINTMENT TOPICAL ONCE
OUTPATIENT
Start: 2024-03-21 | End: 2024-03-21

## 2024-03-21 RX ORDER — LIDOCAINE 40 MG/G
CREAM TOPICAL ONCE
OUTPATIENT
Start: 2024-03-21 | End: 2024-03-21

## 2024-03-21 RX ORDER — CLOBETASOL PROPIONATE 0.5 MG/G
OINTMENT TOPICAL ONCE
OUTPATIENT
Start: 2024-03-21 | End: 2024-03-21

## 2024-03-21 RX ORDER — LIDOCAINE HYDROCHLORIDE 40 MG/ML
SOLUTION TOPICAL ONCE
OUTPATIENT
Start: 2024-03-21 | End: 2024-03-21

## 2024-03-21 RX ORDER — BETAMETHASONE DIPROPIONATE 0.5 MG/G
CREAM TOPICAL ONCE
OUTPATIENT
Start: 2024-03-21 | End: 2024-03-21

## 2024-03-21 RX ORDER — GENTAMICIN SULFATE 1 MG/G
OINTMENT TOPICAL ONCE
OUTPATIENT
Start: 2024-03-21 | End: 2024-03-21

## 2024-03-21 RX ADMIN — Medication: at 14:50

## 2024-03-21 ASSESSMENT — PAIN - FUNCTIONAL ASSESSMENT: PAIN_FUNCTIONAL_ASSESSMENT: 0-10

## 2024-03-21 NOTE — PROGRESS NOTES
[facility]  Progress Note and Procedure Note      Kael Talbot  MEDICAL RECORD NUMBER:  [id number]  AGE: 78 y.o.   GENDER: male  : 1945  EPISODE DATE:  3/21/2024    Subjective:     Chief Complaint   Patient presents with    Wound Check     Bilateral lower legs         HISTORY of PRESENT ILLNESS HPI     Kael Talbot is a 78 y.o. male who presents today for wound/ulcer evaluation.   History of Wound Context: Patient presents today with an ulcer right leg and states his left leg is healed.  Wound/Ulcer Pain Timing/Severity: intermittent, mild  Quality of pain: dull  Severity:  2 / 10   Modifying Factors: Pain worsens with walking  Associated Signs/Symptoms: edema    Ulcer Identification:  Ulcer Type: venous    Contributing Factors: edema and venous stasis    Acute Wound: N/A not an acute wound        PAST MEDICAL HISTORY        Diagnosis Date    A-fib (HCC)     Diabetes mellitus (HCC)     Hyperlipidemia     Hypertension     Thyroid disease        PAST SURGICAL HISTORY    Past Surgical History:   Procedure Laterality Date    ANKLE SURGERY Left     JOINT REPLACEMENT Bilateral     knee replacements    KNEE SURGERY Right        FAMILY HISTORY    Family History   Problem Relation Age of Onset    Arthritis Other     Diabetes Other        SOCIAL HISTORY    Social History     Tobacco Use    Smoking status: Never    Smokeless tobacco: Never   Vaping Use    Vaping Use: Never used   Substance Use Topics    Alcohol use: No       ALLERGIES    No Known Allergies    MEDICATIONS    Current Outpatient Medications on File Prior to Encounter   Medication Sig Dispense Refill    MOUNJARO 7.5 MG/0.5ML SOPN SC injection Inject 0.5 mLs into the skin once a week      Compression Stockings MISC by Does not apply route 20-30 mmHg compession stockings below knee velcro 1 each 0    MOUNJARO 5 MG/0.5ML SOPN SC injection Inject 0.5 mLs into the skin once a week On       levothyroxine (SYNTHROID) 100 MCG

## 2024-03-21 NOTE — PROGRESS NOTES
Multilayer Compression Wrap   (Not Unna) Below the Knee    NAME:  Kael Talbot  YOB: 1945  MEDICAL RECORD NUMBER:  [id number]  DATE:  3/21/2024       Removed old Multilayer wrap if present and washed leg with mild soap/water.   Applied moisturizing agent to dry skin as needed.    Applied primary and secondary dressing as ordered    Applied multilayered dressing below the knee to Right lower leg(s)  (2 Layer compression wrap ) .    Instructed patient/caregiver not to remove dressing and to keep it clean and dry.    Instructed patient/caregiver on complications to report to provider, such as pain, numbness in toes, heavy drainage, and slippage of dressing.    Instructed patient on purpose of compression dressing and on activity and exercise recommendations.   Applied per   Guidelines    Electronically signed by Joan Bustos RN on 3/21/2024 at 3:07 PM

## 2024-03-21 NOTE — PATIENT INSTRUCTIONS
preferred emergency room.     Call your doctor now or seek immediate medical care if:    You have symptoms of infection, such as:  Increased pain, swelling, warmth, or redness.  Red streaks leading from the area.  Pus draining from the area.  A fever.

## 2024-03-28 ENCOUNTER — HOSPITAL ENCOUNTER (OUTPATIENT)
Dept: WOUND CARE | Age: 79
Discharge: HOME | End: 2024-03-28
Attending: PODIATRIST
Payer: MEDICARE

## 2024-03-28 VITALS
HEART RATE: 81 BPM | RESPIRATION RATE: 20 BRPM | TEMPERATURE: 97.3 F | SYSTOLIC BLOOD PRESSURE: 102 MMHG | DIASTOLIC BLOOD PRESSURE: 74 MMHG

## 2024-03-28 DIAGNOSIS — L97.222 NON-PRESSURE CHRONIC ULCER OF LEFT CALF WITH FAT LAYER EXPOSED (HCC): Primary | ICD-10-CM

## 2024-03-28 DIAGNOSIS — L97.212 NON-PRESSURE CHRONIC ULCER OF RIGHT CALF WITH FAT LAYER EXPOSED (HCC): ICD-10-CM

## 2024-03-28 DIAGNOSIS — L97.222 VARICOSE VEINS OF LEFT LOWER EXTREMITY WITH INFLAMMATION, WITH ULCER OF CALF WITH FAT LAYER EXPOSED (HCC): ICD-10-CM

## 2024-03-28 DIAGNOSIS — I83.222 VARICOSE VEINS OF LEFT LOWER EXTREMITY WITH INFLAMMATION, WITH ULCER OF CALF WITH FAT LAYER EXPOSED (HCC): ICD-10-CM

## 2024-03-28 PROCEDURE — 11042 DBRDMT SUBQ TIS 1ST 20SQCM/<: CPT

## 2024-03-28 PROCEDURE — 6370000000 HC RX 637 (ALT 250 FOR IP): Performed by: PODIATRIST

## 2024-03-28 RX ORDER — LIDOCAINE HYDROCHLORIDE 40 MG/ML
SOLUTION TOPICAL ONCE
OUTPATIENT
Start: 2024-03-28 | End: 2024-03-28

## 2024-03-28 RX ORDER — LIDOCAINE 50 MG/G
OINTMENT TOPICAL ONCE
OUTPATIENT
Start: 2024-03-28 | End: 2024-03-28

## 2024-03-28 RX ORDER — LIDOCAINE HYDROCHLORIDE 20 MG/ML
JELLY TOPICAL ONCE
OUTPATIENT
Start: 2024-03-28 | End: 2024-03-28

## 2024-03-28 RX ORDER — BETAMETHASONE DIPROPIONATE 0.5 MG/G
CREAM TOPICAL ONCE
OUTPATIENT
Start: 2024-03-28 | End: 2024-03-28

## 2024-03-28 RX ORDER — GINSENG 100 MG
CAPSULE ORAL ONCE
OUTPATIENT
Start: 2024-03-28 | End: 2024-03-28

## 2024-03-28 RX ORDER — SODIUM CHLOR/HYPOCHLOROUS ACID 0.033 %
SOLUTION, IRRIGATION IRRIGATION ONCE
OUTPATIENT
Start: 2024-03-28 | End: 2024-03-28

## 2024-03-28 RX ORDER — TRIAMCINOLONE ACETONIDE 1 MG/G
OINTMENT TOPICAL ONCE
OUTPATIENT
Start: 2024-03-28 | End: 2024-03-28

## 2024-03-28 RX ORDER — BACITRACIN ZINC AND POLYMYXIN B SULFATE 500; 1000 [USP'U]/G; [USP'U]/G
OINTMENT TOPICAL ONCE
OUTPATIENT
Start: 2024-03-28 | End: 2024-03-28

## 2024-03-28 RX ORDER — CLOBETASOL PROPIONATE 0.5 MG/G
OINTMENT TOPICAL ONCE
OUTPATIENT
Start: 2024-03-28 | End: 2024-03-28

## 2024-03-28 RX ORDER — LIDOCAINE HYDROCHLORIDE 40 MG/ML
SOLUTION TOPICAL ONCE
Status: COMPLETED | OUTPATIENT
Start: 2024-03-28 | End: 2024-03-28

## 2024-03-28 RX ORDER — LIDOCAINE 40 MG/G
CREAM TOPICAL ONCE
OUTPATIENT
Start: 2024-03-28 | End: 2024-03-28

## 2024-03-28 RX ORDER — IBUPROFEN 200 MG
TABLET ORAL ONCE
OUTPATIENT
Start: 2024-03-28 | End: 2024-03-28

## 2024-03-28 RX ORDER — GENTAMICIN SULFATE 1 MG/G
OINTMENT TOPICAL ONCE
OUTPATIENT
Start: 2024-03-28 | End: 2024-03-28

## 2024-03-28 RX ADMIN — LIDOCAINE HYDROCHLORIDE: 40 SOLUTION TOPICAL at 14:51

## 2024-03-28 NOTE — PATIENT INSTRUCTIONS
[facility]  Patient Instructions and Physician Orders  [address] [place]  Telephone: [phone] FAX [phone]    NAME:  Kael Talbot  YOB: 1945  DATE: 3/28/2024       Return Appointment:  Return Appointment: With Jim Sanchez MD  in  1 Week(s)  [] Return Appointment for a Wound Assessment with the nurse on:     Future Appointments   Date Time Provider Department Center   4/25/2024  2:30 PM Ho De Jesus MD [facility]   5/1/2024  1:00 PM Ruy Thomas MD [facility]         Orders Placed This Encounter   Procedures    Initiate Outpatient Wound Care Protocol       Home Care Company: [employer] [facility]:  Phone: [phone]  Fax: [phone]      Medically necessary services for evaluation and treatment: [x]Skilled Nursing (using clean technique) []PT (Eval & Treat) []OT (Eval & Treat) []Social Work []Dietician []Other:      Wound care instructions:  If you smoke we ask that you refrain from smoking. Smoking inhibits wounds from healing.  When taking antibiotics take the entire prescription as ordered. Do not stop taking until medication is all gone unless otherwise instructed.   Exercise as tolerated.   Keep weight off wounds and reposition every 2 hours if applicable.  Do not get wounds wet in the bath or shower unless otherwise instructed by your physician. If your wound is on your foot or leg, you may purchase a cast bag. Please ask at the pharmacy.  Wash hands with soap and water prior to and after every dressing change.    [x]Wash wounds with: 0.9% normal saline  [x]Uzma wound Topical Treatments: Do not apply lotions, creams, or ointments to the skin around the wound bed unless directed as followed:   Apply around the wound: Nothing         [x]Wound Location: right lower leg   Apply Primary Dressing to wound: Collagen (I.e. Puracol)  Secondary Dressing: 4X4 gauze pad and Bulky roll gauze, spandage   Avoid contact

## 2024-03-28 NOTE — PROGRESS NOTES
1442   Dressing/Treatment Collagen 03/14/24 1535   Wound Length (cm) 3.5 cm 03/28/24 1442   Wound Width (cm) 1.5 cm 03/28/24 1442   Wound Depth (cm) 0.1 cm 03/28/24 1442   Wound Surface Area (cm^2) 5.25 cm^2 03/28/24 1442   Change in Wound Size % (l*w) -1540.63 03/28/24 1442   Wound Volume (cm^3) 0.525 cm^3 03/28/24 1442   Wound Healing % -1541 03/28/24 1442   Post-Procedure Length (cm) 3.6 cm 03/28/24 1515   Post-Procedure Width (cm) 1.6 cm 03/28/24 1515   Post-Procedure Depth (cm) 0.3 cm 03/28/24 1515   Post-Procedure Surface Area (cm^2) 5.76 cm^2 03/28/24 1515   Post-Procedure Volume (cm^3) 1.728 cm^3 03/28/24 1515   Distance Tunneling (cm) 0 cm 03/28/24 1442   Tunneling Position ___ O'Clock 0 03/28/24 1442   Undermining Starts ___ O'Clock 0 03/28/24 1442   Undermining Ends___ O'Clock 0 03/28/24 1442   Undermining Maxium Distance (cm) 0 03/28/24 1442   Wound Assessment Pink/red 03/28/24 1442   Drainage Amount Small (< 25%) 03/28/24 1442   Drainage Description Serosanguinous 03/28/24 1442   Odor None 03/28/24 1442   Uzma-wound Assessment Fragile;Intact 03/28/24 1442   Margins Defined edges 03/28/24 1442   Wound Thickness Description not for Pressure Injury Full thickness 03/28/24 1442   Number of days: 34       Wound 03/14/24 Leg Right;Lower;Posterior #4 (Active)   Wound Image   03/14/24 1518   Wound Etiology Venous 03/14/24 1518   Wound Cleansed Cleansed with saline 03/28/24 1442   Dressing/Treatment Collagen 03/21/24 1506   Wound Length (cm) 0.6 cm 03/28/24 1442   Wound Width (cm) 0.8 cm 03/28/24 1442   Wound Depth (cm) 0.1 cm 03/28/24 1442   Wound Surface Area (cm^2) 0.48 cm^2 03/28/24 1442   Change in Wound Size % (l*w) 76 03/28/24 1442   Wound Volume (cm^3) 0.048 cm^3 03/28/24 1442   Wound Healing % 76 03/28/24 1442   Post-Procedure Length (cm) 0.6 cm 03/21/24 1444   Post-Procedure Width (cm) 0.9 cm 03/21/24 1444   Post-Procedure Depth (cm) 0.3 cm 03/21/24 1444   Post-Procedure Surface Area (cm^2) 0.54 cm^2

## 2024-04-04 ENCOUNTER — HOSPITAL ENCOUNTER (OUTPATIENT)
Dept: WOUND CARE | Age: 79
Discharge: HOME | End: 2024-04-04
Attending: PODIATRIST
Payer: MEDICARE

## 2024-04-04 ENCOUNTER — TELEPHONE (OUTPATIENT)
Dept: WOUND CARE | Age: 79
End: 2024-04-04

## 2024-04-04 VITALS
BODY MASS INDEX: 47.91 KG/M2 | WEIGHT: 315 LBS | DIASTOLIC BLOOD PRESSURE: 67 MMHG | HEART RATE: 63 BPM | RESPIRATION RATE: 22 BRPM | SYSTOLIC BLOOD PRESSURE: 96 MMHG | TEMPERATURE: 97.5 F

## 2024-04-04 PROBLEM — I87.319 CHRONIC VENOUS HYPERTENSION WITH ULCER (HCC): Status: ACTIVE | Noted: 2024-02-29

## 2024-04-04 PROBLEM — L97.909 CHRONIC VENOUS HYPERTENSION WITH ULCER (HCC): Status: ACTIVE | Noted: 2024-02-29

## 2024-04-04 PROCEDURE — 99213 OFFICE O/P EST LOW 20 MIN: CPT

## 2024-04-04 NOTE — CARE COORDINATION
Homecare accepted by  Angela with [facility] Phone: [phone]  Fax: [phone] and is to start on Monday 4/8/2024.

## 2024-04-04 NOTE — PROGRESS NOTES
[facility]  Progress Note and Procedure Note      Kael Talbot  AGE: 78 y.o.   GENDER: male  : 1945  EPISODE DATE:  2024      Subjective:     No chief complaint on file.        HISTORY of PRESENT ILLNESS HPI     Kael Talbot is a 78 y.o. male who presents today for wound evaluation.   History of Wound Context: Patient Dr. Blaze Olivares being seen for right leg ulcer secondary to venous disease.  He is unwilling or unable to wear compression stockings and came in today with an ace wrap.  Wound Pain Timing/Severity: none  Quality of pain: N/A  Severity:  0 / 10   Modifying Factors: None  Associated Signs/Symptoms: edema    Wound Identification:  Wound Type: venous  Contributing Factors: edema, venous stasis, lymphedema, and obesity        PAST MEDICAL HISTORY        Diagnosis Date    A-fib (HCC)     Diabetes mellitus (HCC)     Hyperlipidemia     Hypertension     Thyroid disease        PAST SURGICAL HISTORY    Past Surgical History:   Procedure Laterality Date    ANKLE SURGERY Left     JOINT REPLACEMENT Bilateral     knee replacements    KNEE SURGERY Right        FAMILY HISTORY    Family History   Problem Relation Age of Onset    Arthritis Other     Diabetes Other        SOCIAL HISTORY    Social History     Tobacco Use    Smoking status: Never    Smokeless tobacco: Never   Vaping Use    Vaping Use: Never used   Substance Use Topics    Alcohol use: No       ALLERGIES    No Known Allergies    MEDICATIONS    Current Outpatient Medications on File Prior to Encounter   Medication Sig Dispense Refill    MOUNJARO 7.5 MG/0.5ML SOPN SC injection Inject 0.5 mLs into the skin once a week      Compression Stockings MISC by Does not apply route 20-30 mmHg compession stockings below knee velcro 1 each 0    levothyroxine (SYNTHROID) 100 MCG tablet Take 1 tablet by mouth Daily      torsemide (DEMADEX) 20 MG tablet TAKE 2 TABLETS BY MOUTH IN THE  MORNING AND 2 TABLETS BY MOUTH  IN THE EVENING 360

## 2024-04-04 NOTE — PATIENT INSTRUCTIONS
possible.  When to change Dressing: Home Care to do: Monday. Wound care on Thursdays        [x] Edema Control:  Apply: Medigrip on the Bilateral; (Single medium). They should be applied to affected leg(s) from mid foot to knee making sure to cover the heel      Elevate leg(s) above the level of the heart for 30 minutes 4-5 times a day and/or when sitting.  Avoid prolonged standing in one place.    Dietary:  Important dietary reminders:  1. Increase Protein intake (i.e. Lean meats, fish, eggs, legumes, and yogurt)  2. No added salt  3. If diabetic, follow a diabetic diet and check glucose prior to meals or as instructed by your physician.    Dietary Supplements(Take twice a day unless instructed otherwise):  [] Justin  [] 30ml ProStat [] Ensure Complete [] Ensure Max/Premier [] Expedite [] Other:    Your nurse  is:  Linda     Electronically signed by Isatu Chong RN on 4/4/2024 at 12:27 PM     Wound Care Center Information: Should you experience any significant changes in your wound(s) or have questions about your wound care, please contact the [facility] at [phone].   Hours of operation:  Mon:  8AM - 2PM  Tue: 11AM - 5PM  Wed: CLOSED  Thur: 8AM - 4:30PM  Fri:  8AM - 4:30PM  The office is closed on all major holidays.    Please give us 24-48 business hours to return your call.  These hours of operation are subject to change. If you need help with your wounds and cannot wait until we are available, contact your PCP or go to your preferred emergency room.     Call your doctor now or seek immediate medical care if:    You have symptoms of infection, such as:  Increased pain, swelling, warmth, or redness.  Red streaks leading from the area.  Pus draining from the area.  A fever.

## 2024-04-11 ENCOUNTER — HOSPITAL ENCOUNTER (OUTPATIENT)
Dept: WOUND CARE | Age: 79
Discharge: HOME | End: 2024-04-11
Attending: PODIATRIST

## 2024-04-18 ENCOUNTER — HOSPITAL ENCOUNTER (OUTPATIENT)
Dept: WOUND CARE | Age: 79
Discharge: HOME | End: 2024-04-18
Attending: PODIATRIST

## 2024-04-18 VITALS
RESPIRATION RATE: 20 BRPM | HEART RATE: 75 BPM | TEMPERATURE: 98.2 F | DIASTOLIC BLOOD PRESSURE: 65 MMHG | SYSTOLIC BLOOD PRESSURE: 116 MMHG

## 2024-04-18 DIAGNOSIS — I83.222 VARICOSE VEINS OF LEFT LOWER EXTREMITY WITH INFLAMMATION, WITH ULCER OF CALF WITH FAT LAYER EXPOSED (HCC): ICD-10-CM

## 2024-04-18 DIAGNOSIS — L97.222 NON-PRESSURE CHRONIC ULCER OF LEFT CALF WITH FAT LAYER EXPOSED (HCC): Primary | ICD-10-CM

## 2024-04-18 DIAGNOSIS — L97.222 VARICOSE VEINS OF LEFT LOWER EXTREMITY WITH INFLAMMATION, WITH ULCER OF CALF WITH FAT LAYER EXPOSED (HCC): ICD-10-CM

## 2024-04-18 RX ORDER — BETAMETHASONE DIPROPIONATE 0.5 MG/G
CREAM TOPICAL ONCE
OUTPATIENT
Start: 2024-04-18 | End: 2024-04-18

## 2024-04-18 RX ORDER — BACITRACIN ZINC AND POLYMYXIN B SULFATE 500; 1000 [USP'U]/G; [USP'U]/G
OINTMENT TOPICAL ONCE
OUTPATIENT
Start: 2024-04-18 | End: 2024-04-18

## 2024-04-18 RX ORDER — LIDOCAINE HYDROCHLORIDE 40 MG/ML
SOLUTION TOPICAL ONCE
OUTPATIENT
Start: 2024-04-18 | End: 2024-04-18

## 2024-04-18 RX ORDER — GINSENG 100 MG
CAPSULE ORAL ONCE
OUTPATIENT
Start: 2024-04-18 | End: 2024-04-18

## 2024-04-18 RX ORDER — TRIAMCINOLONE ACETONIDE 1 MG/G
OINTMENT TOPICAL ONCE
OUTPATIENT
Start: 2024-04-18 | End: 2024-04-18

## 2024-04-18 RX ORDER — SODIUM CHLOR/HYPOCHLOROUS ACID 0.033 %
SOLUTION, IRRIGATION IRRIGATION ONCE
OUTPATIENT
Start: 2024-04-18 | End: 2024-04-18

## 2024-04-18 RX ORDER — GENTAMICIN SULFATE 1 MG/G
OINTMENT TOPICAL ONCE
OUTPATIENT
Start: 2024-04-18 | End: 2024-04-18

## 2024-04-18 RX ORDER — LIDOCAINE 50 MG/G
OINTMENT TOPICAL ONCE
OUTPATIENT
Start: 2024-04-18 | End: 2024-04-18

## 2024-04-18 RX ORDER — LIDOCAINE 40 MG/G
CREAM TOPICAL ONCE
OUTPATIENT
Start: 2024-04-18 | End: 2024-04-18

## 2024-04-18 RX ORDER — CLOBETASOL PROPIONATE 0.5 MG/G
OINTMENT TOPICAL ONCE
OUTPATIENT
Start: 2024-04-18 | End: 2024-04-18

## 2024-04-18 RX ORDER — LIDOCAINE HYDROCHLORIDE 20 MG/ML
JELLY TOPICAL ONCE
OUTPATIENT
Start: 2024-04-18 | End: 2024-04-18

## 2024-04-18 RX ORDER — IBUPROFEN 200 MG
TABLET ORAL ONCE
OUTPATIENT
Start: 2024-04-18 | End: 2024-04-18

## 2024-04-18 RX ORDER — LIDOCAINE HYDROCHLORIDE 40 MG/ML
SOLUTION TOPICAL ONCE
Status: DISCONTINUED | OUTPATIENT
Start: 2024-04-18 | End: 2024-04-19 | Stop reason: HOSPADM

## 2024-04-18 NOTE — PATIENT INSTRUCTIONS
[facility]  Patient Instructions and Physician Orders  [address] [place]  Telephone: [phone] FAX [phone]    NAME:  Kael Talbot  YOB: 1945  DATE: 4/18/2024       Return Appointment:  Return Appointment: With Blaze Olivares DPM  in  1 Week(s)  [] Return Appointment for a Wound Assessment with the nurse on:     Future Appointments   Date Time Provider Department Center   4/25/2024  2:30 PM Ho De Jesus MD [facility]   5/1/2024  1:00 PM Ruy Thomas MD [place] [facility]         Orders Placed This Encounter   Procedures    Initiate Outpatient Wound Care Protocol       Home Care Company: [facility] Phone: [phone]  Fax: [phone]      Medically necessary services for evaluation and treatment:   [x]Skilled Nursing (using clean technique) []PT (Eval & Treat) []OT (Eval & Treat) []Social Work []Dietician []Other:      Wound care instructions:  If you smoke we ask that you refrain from smoking. Smoking inhibits wounds from healing.  When taking antibiotics take the entire prescription as ordered. Do not stop taking until medication is all gone unless otherwise instructed.   Exercise as tolerated.   Keep weight off wounds and reposition every 2 hours if applicable.  Do not get wounds wet in the bath or shower unless otherwise instructed by your physician. If your wound is on your foot or leg, you may purchase a cast bag/cast cover. This may be purchased at any pharmacy or online.  Wash hands with soap and water prior to and after every dressing change.    [x]Wash wounds with: 0.9% normal saline  [x]Uzma wound Topical Treatments: Do not apply lotions, creams, or ointments to the skin around the wound bed unless directed as followed:   Apply around the wound: No-Sting barrier film         [x]Wound Location: bilateral lower legs   Apply Primary Dressing to wound: Hydrofiber with silver (ie. Opticell Ag,

## 2024-04-18 NOTE — PROGRESS NOTES
[facility]  Progress Note and Procedure Note      Kael Talbot  MEDICAL RECORD NUMBER:  [id number]  AGE: 78 y.o.   GENDER: male  : 1945  EPISODE DATE:  3/21/2024    Subjective:     Chief Complaint   Patient presents with    Wound Check     Bilateral lower legs         HISTORY of PRESENT ILLNESS HPI     Kael Talbot is a 78 y.o. male who presents today for wound/ulcer evaluation.   History of Wound Context: Patient presents today with an ulcer right leg.   Wound/Ulcer Pain Timing/Severity: intermittent, mild  Quality of pain: dull  Severity:  2 / 10   Modifying Factors: Pain worsens with walking  Associated Signs/Symptoms: edema    Ulcer Identification:  Ulcer Type: venous    Contributing Factors: edema and venous stasis    Acute Wound: N/A not an acute wound        PAST MEDICAL HISTORY        Diagnosis Date    A-fib (HCC)     Diabetes mellitus (HCC)     Hyperlipidemia     Hypertension     Thyroid disease        PAST SURGICAL HISTORY    Past Surgical History:   Procedure Laterality Date    ANKLE SURGERY Left     JOINT REPLACEMENT Bilateral     knee replacements    KNEE SURGERY Right        FAMILY HISTORY    Family History   Problem Relation Age of Onset    Arthritis Other     Diabetes Other        SOCIAL HISTORY    Social History     Tobacco Use    Smoking status: Never    Smokeless tobacco: Never   Vaping Use    Vaping Use: Never used   Substance Use Topics    Alcohol use: No       ALLERGIES    No Known Allergies    MEDICATIONS    Current Outpatient Medications on File Prior to Encounter   Medication Sig Dispense Refill    MOUNJARO 7.5 MG/0.5ML SOPN SC injection Inject 0.5 mLs into the skin once a week      Compression Stockings MISC by Does not apply route 20-30 mmHg compession stockings below knee velcro 1 each 0    MOUNJARO 5 MG/0.5ML SOPN SC injection Inject 0.5 mLs into the skin once a week On       levothyroxine (SYNTHROID) 100 MCG tablet       torsemide (DEMADEX) 20

## 2024-04-19 ENCOUNTER — TELEPHONE (OUTPATIENT)
Dept: WOUND CARE | Age: 79
End: 2024-04-19

## 2024-04-19 DIAGNOSIS — L97.222 NON-PRESSURE CHRONIC ULCER OF LEFT CALF WITH FAT LAYER EXPOSED (HCC): Primary | ICD-10-CM

## 2024-04-19 DIAGNOSIS — L97.222 VARICOSE VEINS OF LEFT LOWER EXTREMITY WITH INFLAMMATION, WITH ULCER OF CALF WITH FAT LAYER EXPOSED (HCC): ICD-10-CM

## 2024-04-19 DIAGNOSIS — I83.222 VARICOSE VEINS OF LEFT LOWER EXTREMITY WITH INFLAMMATION, WITH ULCER OF CALF WITH FAT LAYER EXPOSED (HCC): ICD-10-CM

## 2024-04-19 NOTE — TELEPHONE ENCOUNTER
Cannot accept patient due to termination in progress of [facility].  WCC can refer to [facility] or [facility].

## 2024-04-19 NOTE — TELEPHONE ENCOUNTER
Patient active with [facility] Phone: [phone]  Fax: [phone]  Orders sent.

## 2024-04-25 ENCOUNTER — HOSPITAL ENCOUNTER (OUTPATIENT)
Dept: WOUND CARE | Age: 79
Discharge: HOME | End: 2024-04-25
Attending: PODIATRIST
Payer: MEDICARE

## 2024-04-25 VITALS
HEART RATE: 67 BPM | RESPIRATION RATE: 20 BRPM | TEMPERATURE: 97.7 F | SYSTOLIC BLOOD PRESSURE: 147 MMHG | DIASTOLIC BLOOD PRESSURE: 55 MMHG

## 2024-04-25 DIAGNOSIS — L97.222 VARICOSE VEINS OF LEFT LOWER EXTREMITY WITH INFLAMMATION, WITH ULCER OF CALF WITH FAT LAYER EXPOSED (HCC): ICD-10-CM

## 2024-04-25 DIAGNOSIS — I83.222 VARICOSE VEINS OF LEFT LOWER EXTREMITY WITH INFLAMMATION, WITH ULCER OF CALF WITH FAT LAYER EXPOSED (HCC): ICD-10-CM

## 2024-04-25 DIAGNOSIS — L97.222 NON-PRESSURE CHRONIC ULCER OF LEFT CALF WITH FAT LAYER EXPOSED (HCC): Primary | ICD-10-CM

## 2024-04-25 PROCEDURE — 11042 DBRDMT SUBQ TIS 1ST 20SQCM/<: CPT

## 2024-04-25 PROCEDURE — 6370000000 HC RX 637 (ALT 250 FOR IP): Performed by: PODIATRIST

## 2024-04-25 RX ORDER — LIDOCAINE 40 MG/G
CREAM TOPICAL ONCE
OUTPATIENT
Start: 2024-04-25 | End: 2024-04-25

## 2024-04-25 RX ORDER — CLOBETASOL PROPIONATE 0.5 MG/G
OINTMENT TOPICAL ONCE
OUTPATIENT
Start: 2024-04-25 | End: 2024-04-25

## 2024-04-25 RX ORDER — GINSENG 100 MG
CAPSULE ORAL ONCE
OUTPATIENT
Start: 2024-04-25 | End: 2024-04-25

## 2024-04-25 RX ORDER — TRIAMCINOLONE ACETONIDE 1 MG/G
OINTMENT TOPICAL ONCE
OUTPATIENT
Start: 2024-04-25 | End: 2024-04-25

## 2024-04-25 RX ORDER — SODIUM CHLOR/HYPOCHLOROUS ACID 0.033 %
SOLUTION, IRRIGATION IRRIGATION ONCE
OUTPATIENT
Start: 2024-04-25 | End: 2024-04-25

## 2024-04-25 RX ORDER — BETAMETHASONE DIPROPIONATE 0.5 MG/G
CREAM TOPICAL ONCE
OUTPATIENT
Start: 2024-04-25 | End: 2024-04-25

## 2024-04-25 RX ORDER — LIDOCAINE HYDROCHLORIDE 40 MG/ML
SOLUTION TOPICAL ONCE
Status: COMPLETED | OUTPATIENT
Start: 2024-04-25 | End: 2024-04-25

## 2024-04-25 RX ORDER — BACITRACIN ZINC AND POLYMYXIN B SULFATE 500; 1000 [USP'U]/G; [USP'U]/G
OINTMENT TOPICAL ONCE
OUTPATIENT
Start: 2024-04-25 | End: 2024-04-25

## 2024-04-25 RX ORDER — GENTAMICIN SULFATE 1 MG/G
OINTMENT TOPICAL ONCE
OUTPATIENT
Start: 2024-04-25 | End: 2024-04-25

## 2024-04-25 RX ORDER — IBUPROFEN 200 MG
TABLET ORAL ONCE
OUTPATIENT
Start: 2024-04-25 | End: 2024-04-25

## 2024-04-25 RX ORDER — LIDOCAINE HYDROCHLORIDE 40 MG/ML
SOLUTION TOPICAL ONCE
OUTPATIENT
Start: 2024-04-25 | End: 2024-04-25

## 2024-04-25 RX ORDER — LIDOCAINE HYDROCHLORIDE 20 MG/ML
JELLY TOPICAL ONCE
OUTPATIENT
Start: 2024-04-25 | End: 2024-04-25

## 2024-04-25 RX ORDER — LIDOCAINE 50 MG/G
OINTMENT TOPICAL ONCE
OUTPATIENT
Start: 2024-04-25 | End: 2024-04-25

## 2024-04-25 RX ADMIN — LIDOCAINE HYDROCHLORIDE: 40 SOLUTION TOPICAL at 15:33

## 2024-04-25 NOTE — PROGRESS NOTES
[facility]  Progress Note and Procedure Note      Kael Talbot  MEDICAL RECORD NUMBER:  [id number]  AGE: 78 y.o.   GENDER: male  : 1945  EPISODE DATE:  3/21/2024    Subjective:     Chief Complaint   Patient presents with    Wound Check     Bilateral lower legs         HISTORY of PRESENT ILLNESS HPI     Kael Talbot is a 78 y.o. male who presents today for wound/ulcer evaluation.   History of Wound Context: Patient presents today with an ulcer left leg.   Wound/Ulcer Pain Timing/Severity: intermittent, mild  Quality of pain: dull  Severity:  2 / 10   Modifying Factors: Pain worsens with walking  Associated Signs/Symptoms: edema    Ulcer Identification:  Ulcer Type: venous    Contributing Factors: edema and venous stasis    Acute Wound: N/A not an acute wound        PAST MEDICAL HISTORY        Diagnosis Date    A-fib (HCC)     Diabetes mellitus (HCC)     Hyperlipidemia     Hypertension     Thyroid disease        PAST SURGICAL HISTORY    Past Surgical History:   Procedure Laterality Date    ANKLE SURGERY Left     JOINT REPLACEMENT Bilateral     knee replacements    KNEE SURGERY Right        FAMILY HISTORY    Family History   Problem Relation Age of Onset    Arthritis Other     Diabetes Other        SOCIAL HISTORY    Social History     Tobacco Use    Smoking status: Never    Smokeless tobacco: Never   Vaping Use    Vaping Use: Never used   Substance Use Topics    Alcohol use: No       ALLERGIES    No Known Allergies    MEDICATIONS    Current Outpatient Medications on File Prior to Encounter   Medication Sig Dispense Refill    MOUNJARO 7.5 MG/0.5ML SOPN SC injection Inject 0.5 mLs into the skin once a week      Compression Stockings MISC by Does not apply route 20-30 mmHg compession stockings below knee velcro 1 each 0    MOUNJARO 5 MG/0.5ML SOPN SC injection Inject 0.5 mLs into the skin once a week On       levothyroxine (SYNTHROID) 100 MCG tablet       torsemide (DEMADEX) 20

## 2024-04-25 NOTE — PATIENT INSTRUCTIONS
[facility]  Patient Instructions and Physician Orders  [address]  Telephone: [phone] FAX [phone]    NAME:  Kael Talbot  YOB: 1945  DATE: 4/25/2024       Return Appointment:  Return Appointment: With Blaze Olivares DPM  in  1 Week(s)  [] Return Appointment for a Wound Assessment with the nurse on:     Future Appointments   Date Time Provider Department Center   5/1/2024  1:00 PM Ruy Thomas MD [place] [facility]   5/7/2024  4:30 PM Ho De Jesus MD [facility]         Orders Placed This Encounter   Procedures    Initiate Outpatient Wound Care Protocol       Home Care Company: [facility] Phone: [phone]  Fax: [phone]          Medically necessary services for evaluation and treatment:   [x]Skilled Nursing (using clean technique) []PT (Eval & Treat) []OT (Eval & Treat) []Social Work []Dietician []Other:      Wound care instructions:  If you smoke we ask that you refrain from smoking. Smoking inhibits wounds from healing.  When taking antibiotics take the entire prescription as ordered. Do not stop taking until medication is all gone unless otherwise instructed.   Exercise as tolerated.   Keep weight off wounds and reposition every 2 hours if applicable.  Do not get wounds wet in the bath or shower unless otherwise instructed by your physician. If your wound is on your foot or leg, you may purchase a cast bag/cast cover. This may be purchased at any pharmacy or online.  Wash hands with soap and water prior to and after every dressing change.    [x]Wash wounds with: 0.9% normal saline  [x]Uzma wound Topical Treatments: Do not apply lotions, creams, or ointments to the skin around the wound bed unless directed as followed:   Apply around the wound: Nothing         [x]Wound Location: bilateral lower legs     Apply Primary Dressing to wound: Foam with silver (I.e. Polymem Ag)  Secondary Dressing:

## 2024-05-01 ENCOUNTER — OFFICE VISIT (OUTPATIENT)
Dept: CARDIOLOGY CLINIC | Age: 79
End: 2024-05-01
Payer: MEDICARE

## 2024-05-01 VITALS
DIASTOLIC BLOOD PRESSURE: 70 MMHG | HEART RATE: 66 BPM | SYSTOLIC BLOOD PRESSURE: 136 MMHG | WEIGHT: 315 LBS | BODY MASS INDEX: 46.86 KG/M2

## 2024-05-01 DIAGNOSIS — E78.5 HYPERLIPIDEMIA, UNSPECIFIED HYPERLIPIDEMIA TYPE: Primary | ICD-10-CM

## 2024-05-01 DIAGNOSIS — I25.10 CORONARY ARTERY DISEASE INVOLVING NATIVE CORONARY ARTERY OF NATIVE HEART WITHOUT ANGINA PECTORIS: ICD-10-CM

## 2024-05-01 DIAGNOSIS — I10 HTN (HYPERTENSION), BENIGN: ICD-10-CM

## 2024-05-01 PROCEDURE — 99214 OFFICE O/P EST MOD 30 MIN: CPT | Performed by: INTERNAL MEDICINE

## 2024-05-01 PROCEDURE — 1124F ACP DISCUSS-NO DSCNMKR DOCD: CPT | Performed by: INTERNAL MEDICINE

## 2024-05-01 PROCEDURE — 93000 ELECTROCARDIOGRAM COMPLETE: CPT | Performed by: INTERNAL MEDICINE

## 2024-05-01 PROCEDURE — 3078F DIAST BP <80 MM HG: CPT | Performed by: INTERNAL MEDICINE

## 2024-05-01 PROCEDURE — 3075F SYST BP GE 130 - 139MM HG: CPT | Performed by: INTERNAL MEDICINE

## 2024-05-01 NOTE — PROGRESS NOTES
CC:  Follow up AF RVR     Subjective:    Mr. Talbot feels better and has controlled HR today.  Getting monjaro on 5/1/24 visit  lost 23 lbs.  No chest pain.  No sob no orthopnea.  Wears compression stockings from leg wound therapy.      Objective:   Vitals:    05/01/24 1317   BP: 136/70   Pulse: 66        Exam unchanged from 5/13/22    Physical Exam:  General:  Awake, alert, NAD  Eyes:  EOMI PERRL anicteric  Skin:  Warm and dry.   Neck:  JVP normal  Chest:  Diminshed.  Rales.  Cardiovascular: reg rhythm.  Normal S1S2.  No Murmur.  No Rub.  No Gallop.  Abdomen:  Soft NT  + bs  Extremities:2+ edema.  Redness pretibial area left LE.   Neuro:  CN II-XII intact.  No focal deficits.  No weakness.  No lateralizing findings.  Psych:  Normal thought and affect.    MSK:  No Cyanosis nor Clubbing.  Symmetrical strength upper and lower extremities         Plan:  HF:  Continue diuresis with torsemide 2 am and 2 in pm.  Improving.  Acute diastolic HF with LVEF 55% on echo.  AF RVR: seems to be in SR clinically.   Continue amiodarone at 200 daily.  Pt is taking eliquis  HF  Controlled presently  MPI:  No ischemia.  Echo with no WMA.  Cellulitis LLE pretibial area: keflex 500 mg po tid.   prob has YOHANA  may get treated in 2024.  Continue current medications.  Stable cardiovascular status.      Ruy Thomas MD, MD 7/16/2021 5:17 PM

## 2024-05-02 ENCOUNTER — HOSPITAL ENCOUNTER (OUTPATIENT)
Dept: WOUND CARE | Age: 79
Discharge: HOME | End: 2024-05-02
Attending: PODIATRIST
Payer: MEDICARE

## 2024-05-02 VITALS
DIASTOLIC BLOOD PRESSURE: 71 MMHG | RESPIRATION RATE: 20 BRPM | TEMPERATURE: 97.5 F | HEART RATE: 71 BPM | SYSTOLIC BLOOD PRESSURE: 124 MMHG

## 2024-05-02 DIAGNOSIS — L97.222 VARICOSE VEINS OF LEFT LOWER EXTREMITY WITH INFLAMMATION, WITH ULCER OF CALF WITH FAT LAYER EXPOSED (HCC): ICD-10-CM

## 2024-05-02 DIAGNOSIS — L97.222 NON-PRESSURE CHRONIC ULCER OF LEFT CALF WITH FAT LAYER EXPOSED (HCC): Primary | ICD-10-CM

## 2024-05-02 DIAGNOSIS — I83.222 VARICOSE VEINS OF LEFT LOWER EXTREMITY WITH INFLAMMATION, WITH ULCER OF CALF WITH FAT LAYER EXPOSED (HCC): ICD-10-CM

## 2024-05-02 PROCEDURE — 11042 DBRDMT SUBQ TIS 1ST 20SQCM/<: CPT

## 2024-05-02 PROCEDURE — 6370000000 HC RX 637 (ALT 250 FOR IP): Performed by: PODIATRIST

## 2024-05-02 RX ORDER — IBUPROFEN 200 MG
TABLET ORAL ONCE
OUTPATIENT
Start: 2024-05-02 | End: 2024-05-02

## 2024-05-02 RX ORDER — GINSENG 100 MG
CAPSULE ORAL ONCE
OUTPATIENT
Start: 2024-05-02 | End: 2024-05-02

## 2024-05-02 RX ORDER — TRIAMCINOLONE ACETONIDE 1 MG/G
OINTMENT TOPICAL ONCE
OUTPATIENT
Start: 2024-05-02 | End: 2024-05-02

## 2024-05-02 RX ORDER — LIDOCAINE HYDROCHLORIDE 20 MG/ML
JELLY TOPICAL ONCE
OUTPATIENT
Start: 2024-05-02 | End: 2024-05-02

## 2024-05-02 RX ORDER — BETAMETHASONE DIPROPIONATE 0.5 MG/G
CREAM TOPICAL ONCE
OUTPATIENT
Start: 2024-05-02 | End: 2024-05-02

## 2024-05-02 RX ORDER — LIDOCAINE HYDROCHLORIDE 40 MG/ML
SOLUTION TOPICAL ONCE
Status: COMPLETED | OUTPATIENT
Start: 2024-05-02 | End: 2024-05-02

## 2024-05-02 RX ORDER — BACITRACIN ZINC AND POLYMYXIN B SULFATE 500; 1000 [USP'U]/G; [USP'U]/G
OINTMENT TOPICAL ONCE
OUTPATIENT
Start: 2024-05-02 | End: 2024-05-02

## 2024-05-02 RX ORDER — LIDOCAINE 40 MG/G
CREAM TOPICAL ONCE
OUTPATIENT
Start: 2024-05-02 | End: 2024-05-02

## 2024-05-02 RX ORDER — LIDOCAINE HYDROCHLORIDE 40 MG/ML
SOLUTION TOPICAL ONCE
OUTPATIENT
Start: 2024-05-02 | End: 2024-05-02

## 2024-05-02 RX ORDER — LIDOCAINE 50 MG/G
OINTMENT TOPICAL ONCE
OUTPATIENT
Start: 2024-05-02 | End: 2024-05-02

## 2024-05-02 RX ORDER — CLOBETASOL PROPIONATE 0.5 MG/G
OINTMENT TOPICAL ONCE
OUTPATIENT
Start: 2024-05-02 | End: 2024-05-02

## 2024-05-02 RX ORDER — SODIUM CHLOR/HYPOCHLOROUS ACID 0.033 %
SOLUTION, IRRIGATION IRRIGATION ONCE
OUTPATIENT
Start: 2024-05-02 | End: 2024-05-02

## 2024-05-02 RX ORDER — GENTAMICIN SULFATE 1 MG/G
OINTMENT TOPICAL ONCE
OUTPATIENT
Start: 2024-05-02 | End: 2024-05-02

## 2024-05-02 RX ADMIN — LIDOCAINE HYDROCHLORIDE: 40 SOLUTION TOPICAL at 13:06

## 2024-05-02 NOTE — PROGRESS NOTES
Information: Should you experience any significant changes in your wound(s) or have questions about your wound care, please contact the [facility] at [phone].   Hours of operation:  Mon:  8AM - 2PM  Tue: 11AM - 5PM  Wed: CLOSED  Thur: 8AM - 4:30PM  Fri:  8AM - 4:30PM  The office is closed on all major holidays.    Please give us 24-48 business hours to return your call.  These hours of operation are subject to change. If you need help with your wounds and cannot wait until we are available, contact your PCP or go to your preferred emergency room.     Call your doctor now or seek immediate medical care if:    You have symptoms of infection, such as:  Increased pain, swelling, warmth, or redness.  Red streaks leading from the area.  Pus draining from the area.  A fever.             Electronically signed by Blaze Olivares DPM on 5/2/2024 at 1:33 PM

## 2024-05-02 NOTE — PATIENT INSTRUCTIONS
[facility]  Patient Instructions and Physician Orders  [address]  Telephone: [phone] FAX [phone]    NAME:  Kael Talbot  YOB: 1945  DATE: 5/2/2024       Return Appointment:  Return Appointment: With Blaze Olivares DPM  in  1 Week(s)  [] Return Appointment for a Wound Assessment with the nurse on:     Future Appointments   Date Time Provider Department Center   5/7/2024  4:30 PM Ho De Jesus MD [facility]   11/1/2024  1:15 PM Ruy Thomas MD [place] [facility]         Orders Placed This Encounter   Procedures    Initiate Outpatient Wound Care Protocol       Home Care Company: [facility] Phone: [phone]  Fax: [phone]        Medically necessary services for evaluation and treatment:   [x]Skilled Nursing (using clean technique) []PT (Eval & Treat) []OT (Eval & Treat) []Social Work []Dietician []Other:      Wound care instructions:  If you smoke we ask that you refrain from smoking. Smoking inhibits wounds from healing.  When taking antibiotics take the entire prescription as ordered. Do not stop taking until medication is all gone unless otherwise instructed.   Exercise as tolerated.   Keep weight off wounds and reposition every 2 hours if applicable.  Do not get wounds wet in the bath or shower unless otherwise instructed by your physician. If your wound is on your foot or leg, you may purchase a cast bag/cast cover. This may be purchased at any pharmacy or online.  Wash hands with soap and water prior to and after every dressing change.    [x]Wash wounds with: 0.9% normal saline  [x]Uzma wound Topical Treatments: Do not apply lotions, creams, or ointments to the skin around the wound bed unless directed as followed:   Apply around the wound: No-Sting barrier film         [x]Wound Location: left lower leg   Apply Primary Dressing to wound: Foam with silver (I.e. Polymem Ag)  Secondary

## 2024-05-09 ENCOUNTER — HOSPITAL ENCOUNTER (OUTPATIENT)
Dept: WOUND CARE | Age: 79
Discharge: HOME | End: 2024-05-09
Attending: PODIATRIST
Payer: MEDICARE

## 2024-05-09 VITALS — WEIGHT: 315 LBS | BODY MASS INDEX: 46.61 KG/M2

## 2024-05-09 DIAGNOSIS — L97.222 VARICOSE VEINS OF LEFT LOWER EXTREMITY WITH INFLAMMATION, WITH ULCER OF CALF WITH FAT LAYER EXPOSED (HCC): ICD-10-CM

## 2024-05-09 DIAGNOSIS — L97.222 NON-PRESSURE CHRONIC ULCER OF LEFT CALF WITH FAT LAYER EXPOSED (HCC): Primary | ICD-10-CM

## 2024-05-09 DIAGNOSIS — I83.222 VARICOSE VEINS OF LEFT LOWER EXTREMITY WITH INFLAMMATION, WITH ULCER OF CALF WITH FAT LAYER EXPOSED (HCC): ICD-10-CM

## 2024-05-09 PROCEDURE — 6370000000 HC RX 637 (ALT 250 FOR IP): Performed by: PODIATRIST

## 2024-05-09 PROCEDURE — 11042 DBRDMT SUBQ TIS 1ST 20SQCM/<: CPT

## 2024-05-09 RX ORDER — LIDOCAINE HYDROCHLORIDE 20 MG/ML
JELLY TOPICAL ONCE
OUTPATIENT
Start: 2024-05-09 | End: 2024-05-09

## 2024-05-09 RX ORDER — GINSENG 100 MG
CAPSULE ORAL ONCE
OUTPATIENT
Start: 2024-05-09 | End: 2024-05-09

## 2024-05-09 RX ORDER — LIDOCAINE 40 MG/G
CREAM TOPICAL ONCE
OUTPATIENT
Start: 2024-05-09 | End: 2024-05-09

## 2024-05-09 RX ORDER — GENTAMICIN SULFATE 1 MG/G
OINTMENT TOPICAL ONCE
OUTPATIENT
Start: 2024-05-09 | End: 2024-05-09

## 2024-05-09 RX ORDER — LIDOCAINE HYDROCHLORIDE 40 MG/ML
SOLUTION TOPICAL ONCE
Status: COMPLETED | OUTPATIENT
Start: 2024-05-09 | End: 2024-05-09

## 2024-05-09 RX ORDER — LIDOCAINE HYDROCHLORIDE 40 MG/ML
SOLUTION TOPICAL ONCE
OUTPATIENT
Start: 2024-05-09 | End: 2024-05-09

## 2024-05-09 RX ORDER — BETAMETHASONE DIPROPIONATE 0.5 MG/G
CREAM TOPICAL ONCE
OUTPATIENT
Start: 2024-05-09 | End: 2024-05-09

## 2024-05-09 RX ORDER — SODIUM CHLOR/HYPOCHLOROUS ACID 0.033 %
SOLUTION, IRRIGATION IRRIGATION ONCE
OUTPATIENT
Start: 2024-05-09 | End: 2024-05-09

## 2024-05-09 RX ORDER — LIDOCAINE 50 MG/G
OINTMENT TOPICAL ONCE
OUTPATIENT
Start: 2024-05-09 | End: 2024-05-09

## 2024-05-09 RX ORDER — TRIAMCINOLONE ACETONIDE 1 MG/G
OINTMENT TOPICAL ONCE
OUTPATIENT
Start: 2024-05-09 | End: 2024-05-09

## 2024-05-09 RX ORDER — BACITRACIN ZINC AND POLYMYXIN B SULFATE 500; 1000 [USP'U]/G; [USP'U]/G
OINTMENT TOPICAL ONCE
OUTPATIENT
Start: 2024-05-09 | End: 2024-05-09

## 2024-05-09 RX ORDER — IBUPROFEN 200 MG
TABLET ORAL ONCE
OUTPATIENT
Start: 2024-05-09 | End: 2024-05-09

## 2024-05-09 RX ORDER — CLOBETASOL PROPIONATE 0.5 MG/G
OINTMENT TOPICAL ONCE
OUTPATIENT
Start: 2024-05-09 | End: 2024-05-09

## 2024-05-09 RX ADMIN — LIDOCAINE HYDROCHLORIDE: 40 SOLUTION TOPICAL at 13:42

## 2024-05-09 NOTE — PROGRESS NOTES
[facility]  Progress Note and Procedure Note      Kael Talbot  MEDICAL RECORD NUMBER:  [id number]  AGE: 78 y.o.   GENDER: male  : 1945  EPISODE DATE:  2024    Subjective:     Chief Complaint   Patient presents with    Wound Check     Bilateral lower legs         HISTORY of PRESENT ILLNESS HPI     Kael Talbot is a 78 y.o. male who presents today for wound/ulcer evaluation.   History of Wound Context: Patient presents today with an ulcer left leg.   Wound/Ulcer Pain Timing/Severity: intermittent, mild  Quality of pain: dull  Severity:  2 / 10   Modifying Factors: Pain worsens with walking  Associated Signs/Symptoms: edema    Ulcer Identification:  Ulcer Type: venous    Contributing Factors: edema and venous stasis    Acute Wound: N/A not an acute wound        PAST MEDICAL HISTORY        Diagnosis Date    A-fib (HCC)     Diabetes mellitus (HCC)     Hyperlipidemia     Hypertension     Thyroid disease        PAST SURGICAL HISTORY    Past Surgical History:   Procedure Laterality Date    ANKLE SURGERY Left     JOINT REPLACEMENT Bilateral     knee replacements    KNEE SURGERY Right        FAMILY HISTORY    Family History   Problem Relation Age of Onset    Arthritis Other     Diabetes Other        SOCIAL HISTORY    Social History     Tobacco Use    Smoking status: Never    Smokeless tobacco: Never   Vaping Use    Vaping Use: Never used   Substance Use Topics    Alcohol use: No       ALLERGIES    No Known Allergies    MEDICATIONS    Current Outpatient Medications on File Prior to Encounter   Medication Sig Dispense Refill    MOUNJARO 7.5 MG/0.5ML SOPN SC injection Inject 0.5 mLs into the skin once a week      Compression Stockings MISC by Does not apply route 20-30 mmHg compession stockings below knee velcro 1 each 0    levothyroxine (SYNTHROID) 100 MCG tablet Take 1 tablet by mouth Daily      torsemide (DEMADEX) 20 MG tablet TAKE 2 TABLETS BY MOUTH IN THE  MORNING AND 2 TABLETS BY

## 2024-05-09 NOTE — PATIENT INSTRUCTIONS
[facility]  Patient Instructions and Physician Orders  [address]  Telephone: [phone] FAX [phone]    NAME:  Kael Talbot  YOB: 1945  DATE: 5/9/2024       Return Appointment:  Return Appointment: With Blaze Olivares DPM  in  1 Week(s)  [] Return Appointment for a Wound Assessment with the nurse on:     Future Appointments   Date Time Provider Department Center   11/1/2024  1:15 PM Ruy Thomas MD [place] [facility]         Orders Placed This Encounter   Procedures    Initiate Outpatient Wound Care Protocol       Home Care Company: [facility] Phone: [phone]  Fax: [phone]      Medically necessary services for evaluation and treatment:   []Skilled Nursing (using clean technique) []PT (Eval & Treat) []OT (Eval & Treat) []Social Work []Dietician []Other:      Wound care instructions:  If you smoke we ask that you refrain from smoking. Smoking inhibits wounds from healing.  When taking antibiotics take the entire prescription as ordered. Do not stop taking until medication is all gone unless otherwise instructed.   Exercise as tolerated.   Keep weight off wounds and reposition every 2 hours if applicable.  Do not get wounds wet in the bath or shower unless otherwise instructed by your physician. If your wound is on your foot or leg, you may purchase a cast bag/cast cover. This may be purchased at any pharmacy or online.  Wash hands with soap and water prior to and after every dressing change.    [x]Wash wounds with: 0.9% normal saline  [x]Uzma wound Topical Treatments: Do not apply lotions, creams, or ointments to the skin around the wound bed unless directed as followed:   Apply around the wound: No-Sting barrier film         [x]Wound Location: left lower leg   Apply Primary Dressing to wound: Foam with silver (I.e. Polymem Ag)  Secondary Dressing: 4X4 gauze pad and Conforming roll gauze, spandage   Avoid contact

## 2024-05-16 ENCOUNTER — HOSPITAL ENCOUNTER (OUTPATIENT)
Dept: WOUND CARE | Age: 79
Discharge: HOME | End: 2024-05-16
Attending: PODIATRIST

## 2024-05-24 ENCOUNTER — HOSPITAL ENCOUNTER (OUTPATIENT)
Dept: WOUND CARE | Age: 79
Discharge: HOME | End: 2024-05-24
Attending: PODIATRIST
Payer: MEDICARE

## 2024-05-24 VITALS
HEART RATE: 82 BPM | TEMPERATURE: 97 F | DIASTOLIC BLOOD PRESSURE: 65 MMHG | SYSTOLIC BLOOD PRESSURE: 146 MMHG | RESPIRATION RATE: 18 BRPM

## 2024-05-24 DIAGNOSIS — I83.222 VARICOSE VEINS OF LEFT LOWER EXTREMITY WITH INFLAMMATION, WITH ULCER OF CALF WITH FAT LAYER EXPOSED (HCC): ICD-10-CM

## 2024-05-24 DIAGNOSIS — L97.222 NON-PRESSURE CHRONIC ULCER OF LEFT CALF WITH FAT LAYER EXPOSED (HCC): Primary | ICD-10-CM

## 2024-05-24 DIAGNOSIS — L97.222 VARICOSE VEINS OF LEFT LOWER EXTREMITY WITH INFLAMMATION, WITH ULCER OF CALF WITH FAT LAYER EXPOSED (HCC): ICD-10-CM

## 2024-05-24 PROCEDURE — 99213 OFFICE O/P EST LOW 20 MIN: CPT | Performed by: SPECIALIST

## 2024-05-24 PROCEDURE — 99213 OFFICE O/P EST LOW 20 MIN: CPT

## 2024-05-24 PROCEDURE — 6370000000 HC RX 637 (ALT 250 FOR IP): Performed by: PODIATRIST

## 2024-05-24 RX ORDER — LIDOCAINE HYDROCHLORIDE 40 MG/ML
SOLUTION TOPICAL ONCE
Status: COMPLETED | OUTPATIENT
Start: 2024-05-24 | End: 2024-05-24

## 2024-05-24 RX ORDER — CLOBETASOL PROPIONATE 0.5 MG/G
OINTMENT TOPICAL ONCE
OUTPATIENT
Start: 2024-05-24 | End: 2024-05-24

## 2024-05-24 RX ORDER — LIDOCAINE 50 MG/G
OINTMENT TOPICAL ONCE
OUTPATIENT
Start: 2024-05-24 | End: 2024-05-24

## 2024-05-24 RX ORDER — GENTAMICIN SULFATE 1 MG/G
OINTMENT TOPICAL ONCE
OUTPATIENT
Start: 2024-05-24 | End: 2024-05-24

## 2024-05-24 RX ORDER — BETAMETHASONE DIPROPIONATE 0.5 MG/G
CREAM TOPICAL ONCE
OUTPATIENT
Start: 2024-05-24 | End: 2024-05-24

## 2024-05-24 RX ORDER — GINSENG 100 MG
CAPSULE ORAL ONCE
OUTPATIENT
Start: 2024-05-24 | End: 2024-05-24

## 2024-05-24 RX ORDER — LIDOCAINE HYDROCHLORIDE 20 MG/ML
JELLY TOPICAL ONCE
OUTPATIENT
Start: 2024-05-24 | End: 2024-05-24

## 2024-05-24 RX ORDER — LIDOCAINE HYDROCHLORIDE 40 MG/ML
SOLUTION TOPICAL ONCE
OUTPATIENT
Start: 2024-05-24 | End: 2024-05-24

## 2024-05-24 RX ORDER — LIDOCAINE 40 MG/G
CREAM TOPICAL ONCE
OUTPATIENT
Start: 2024-05-24 | End: 2024-05-24

## 2024-05-24 RX ORDER — IBUPROFEN 200 MG
TABLET ORAL ONCE
OUTPATIENT
Start: 2024-05-24 | End: 2024-05-24

## 2024-05-24 RX ORDER — SODIUM CHLOR/HYPOCHLOROUS ACID 0.033 %
SOLUTION, IRRIGATION IRRIGATION ONCE
OUTPATIENT
Start: 2024-05-24 | End: 2024-05-24

## 2024-05-24 RX ORDER — TRIAMCINOLONE ACETONIDE 1 MG/G
OINTMENT TOPICAL ONCE
OUTPATIENT
Start: 2024-05-24 | End: 2024-05-24

## 2024-05-24 RX ORDER — BACITRACIN ZINC AND POLYMYXIN B SULFATE 500; 1000 [USP'U]/G; [USP'U]/G
OINTMENT TOPICAL ONCE
OUTPATIENT
Start: 2024-05-24 | End: 2024-05-24

## 2024-05-24 RX ADMIN — LIDOCAINE HYDROCHLORIDE: 40 SOLUTION TOPICAL at 10:46

## 2024-05-24 ASSESSMENT — PAIN DESCRIPTION - PAIN TYPE: TYPE: ACUTE PAIN

## 2024-05-24 ASSESSMENT — PAIN DESCRIPTION - LOCATION: LOCATION: GENERALIZED

## 2024-05-24 ASSESSMENT — PAIN DESCRIPTION - DESCRIPTORS: DESCRIPTORS: ACHING

## 2024-05-24 ASSESSMENT — PAIN SCALES - GENERAL: PAINLEVEL_OUTOF10: 2

## 2024-05-24 NOTE — PROGRESS NOTES
None 05/24/24 1047   Uzma-wound Assessment Edematous 05/24/24 1047   Margins Defined edges 05/24/24 1047   Wound Thickness Description not for Pressure Injury Full thickness 05/24/24 1047   Number of days: 0          Plan:   Discussed with patient utilizing stronger compression.  He agrees to attempt to wear bilateral high Spandagrip  Treatment Note please see attached Discharge Instructions    New Prescriptions    No medications on file       Orders Placed This Encounter   Procedures    Initiate Outpatient Wound Care Protocol       Written patient dismissal instructions given to patient and signed by patient or POA.           Patient Instructions     [facility]  Patient Instructions and Physician Orders  [address] [place]  Telephone: [phone] FAX [phone]    NAME:  Kael Talbot  YOB: 1945  DATE: 5/24/2024       Return Appointment:  Return Appointment: With Blaze Olivares DPM  in  1 Week(s)  [] Return Appointment for a Wound Assessment with the nurse on:     Future Appointments   Date Time Provider Department Center   11/1/2024  1:15 PM Ruy Thomas MD [place] [facility]         Orders Placed This Encounter   Procedures    Initiate Outpatient Wound Care Protocol       Home Care Company: [facility] Phone: [phone]  Fax: [phone]      Medically necessary services for evaluation and treatment:   [x]Skilled Nursing (using clean technique) []PT (Eval & Treat) []OT (Eval & Treat) []Social Work []Dietician [x]Other: home health aide for bathing      Wound care instructions:  If you smoke we ask that you refrain from smoking. Smoking inhibits wounds from healing.  When taking antibiotics take the entire prescription as ordered. Do not stop taking until medication is all gone unless otherwise instructed.   Exercise as tolerated.   Keep weight off wounds and reposition every 2 hours if applicable.  Do not get wounds wet in the

## 2024-05-24 NOTE — PATIENT INSTRUCTIONS
gauze, spandage   Avoid contact of tape with skin if possible.  When to change Dressing: Home Care to do: Monday        [x] Edema Control:  Apply: spandagrip on the Bilateral; (Single high). They should be applied to affected leg(s) from mid foot to knee making sure to cover the heel      Elevate leg(s) above the level of the heart for 30 minutes 4-5 times a day and/or when sitting.  Avoid prolonged standing in one place.          Dietary:  Important dietary reminders:  1. Increase Protein intake (i.e. Lean meats, fish, eggs, legumes, and yogurt)  2. No added salt  3. If diabetic, follow a diabetic diet and check glucose prior to meals or as instructed by your physician.    Dietary Supplements(Take twice a day unless instructed otherwise):  [] Justin  [] 30ml ProStat [] Ensure Complete [] Ensure Max/Premier [] Expedite [] Other:    Your nurse  is:  Linda       Electronically signed by Isatu Chong RN on 5/24/2024 at 11:24 AM     Wound Care Center Information: Should you experience any significant changes in your wound(s) or have questions about your wound care, please contact the [facility] at [phone].   Hours of operation:  Mon:  8AM - 2PM  Tue: 11AM - 5PM  Wed: CLOSED  Thur: 8AM - 4:30PM  Fri:  8AM - 4:30PM  The office is closed on all major holidays.    Please give us 24-48 business hours to return your call.  These hours of operation are subject to change. If you need help with your wounds and cannot wait until we are available, contact your PCP or go to your preferred emergency room.     Call your doctor now or seek immediate medical care if:    You have symptoms of infection, such as:  Increased pain, swelling, warmth, or redness.  Red streaks leading from the area.  Pus draining from the area.  A fever.

## 2024-05-30 ENCOUNTER — HOSPITAL ENCOUNTER (OUTPATIENT)
Dept: WOUND CARE | Age: 79
Discharge: HOME | End: 2024-05-30
Attending: PODIATRIST
Payer: MEDICARE

## 2024-05-30 VITALS
SYSTOLIC BLOOD PRESSURE: 118 MMHG | TEMPERATURE: 97.7 F | HEART RATE: 69 BPM | RESPIRATION RATE: 18 BRPM | DIASTOLIC BLOOD PRESSURE: 66 MMHG

## 2024-05-30 DIAGNOSIS — L97.222 NON-PRESSURE CHRONIC ULCER OF LEFT CALF WITH FAT LAYER EXPOSED (HCC): Primary | ICD-10-CM

## 2024-05-30 DIAGNOSIS — L97.222 VARICOSE VEINS OF LEFT LOWER EXTREMITY WITH INFLAMMATION, WITH ULCER OF CALF WITH FAT LAYER EXPOSED (HCC): ICD-10-CM

## 2024-05-30 DIAGNOSIS — I83.222 VARICOSE VEINS OF LEFT LOWER EXTREMITY WITH INFLAMMATION, WITH ULCER OF CALF WITH FAT LAYER EXPOSED (HCC): ICD-10-CM

## 2024-05-30 PROCEDURE — 11042 DBRDMT SUBQ TIS 1ST 20SQCM/<: CPT

## 2024-05-30 PROCEDURE — 6370000000 HC RX 637 (ALT 250 FOR IP): Performed by: PODIATRIST

## 2024-05-30 RX ORDER — LIDOCAINE HYDROCHLORIDE 40 MG/ML
SOLUTION TOPICAL ONCE
OUTPATIENT
Start: 2024-05-30 | End: 2024-05-30

## 2024-05-30 RX ORDER — LIDOCAINE HYDROCHLORIDE 20 MG/ML
JELLY TOPICAL ONCE
OUTPATIENT
Start: 2024-05-30 | End: 2024-05-30

## 2024-05-30 RX ORDER — TRIAMCINOLONE ACETONIDE 1 MG/G
OINTMENT TOPICAL ONCE
OUTPATIENT
Start: 2024-05-30 | End: 2024-05-30

## 2024-05-30 RX ORDER — IBUPROFEN 200 MG
TABLET ORAL ONCE
OUTPATIENT
Start: 2024-05-30 | End: 2024-05-30

## 2024-05-30 RX ORDER — BETAMETHASONE DIPROPIONATE 0.5 MG/G
CREAM TOPICAL ONCE
OUTPATIENT
Start: 2024-05-30 | End: 2024-05-30

## 2024-05-30 RX ORDER — SODIUM CHLOR/HYPOCHLOROUS ACID 0.033 %
SOLUTION, IRRIGATION IRRIGATION ONCE
OUTPATIENT
Start: 2024-05-30 | End: 2024-05-30

## 2024-05-30 RX ORDER — LIDOCAINE 50 MG/G
OINTMENT TOPICAL ONCE
OUTPATIENT
Start: 2024-05-30 | End: 2024-05-30

## 2024-05-30 RX ORDER — LIDOCAINE 40 MG/G
CREAM TOPICAL ONCE
OUTPATIENT
Start: 2024-05-30 | End: 2024-05-30

## 2024-05-30 RX ORDER — CLOBETASOL PROPIONATE 0.5 MG/G
OINTMENT TOPICAL ONCE
OUTPATIENT
Start: 2024-05-30 | End: 2024-05-30

## 2024-05-30 RX ORDER — BACITRACIN ZINC AND POLYMYXIN B SULFATE 500; 1000 [USP'U]/G; [USP'U]/G
OINTMENT TOPICAL ONCE
OUTPATIENT
Start: 2024-05-30 | End: 2024-05-30

## 2024-05-30 RX ORDER — GINSENG 100 MG
CAPSULE ORAL ONCE
OUTPATIENT
Start: 2024-05-30 | End: 2024-05-30

## 2024-05-30 RX ORDER — LIDOCAINE HYDROCHLORIDE 40 MG/ML
SOLUTION TOPICAL ONCE
Status: COMPLETED | OUTPATIENT
Start: 2024-05-30 | End: 2024-05-30

## 2024-05-30 RX ORDER — GENTAMICIN SULFATE 1 MG/G
OINTMENT TOPICAL ONCE
OUTPATIENT
Start: 2024-05-30 | End: 2024-05-30

## 2024-05-30 RX ADMIN — LIDOCAINE HYDROCHLORIDE: 40 SOLUTION TOPICAL at 15:13

## 2024-05-30 NOTE — PATIENT INSTRUCTIONS
[facility]  Patient Instructions and Physician Orders  [address] [place]  Telephone: [phone] FAX [phone]    NAME:  aKel Talbot  YOB: 1945  DATE: 5/30/2024       Return Appointment:  Return Appointment: With Blaze Olivares DPM  in  1 Week(s)  [] Return Appointment for a Wound Assessment with the nurse on:     Future Appointments   Date Time Provider Department Center   5/30/2024  3:30 PM Blaze Olivares DPM [facility]   11/1/2024  1:15 PM Ruy Thomas MD [facility]         Orders Placed This Encounter   Procedures    Initiate Outpatient Wound Care Protocol       Home Care Company: [facility] Phone: [phone]  Fax: [phone]. Please order supplies.      Medically necessary services for evaluation and treatment:   []Skilled Nursing (using clean technique) []PT (Eval & Treat) []OT (Eval & Treat) []Social Work []Dietician []Other:      Wound care instructions:  If you smoke we ask that you refrain from smoking. Smoking inhibits wounds from healing.  When taking antibiotics take the entire prescription as ordered. Do not stop taking until medication is all gone unless otherwise instructed.   Exercise as tolerated.   Keep weight off wounds and reposition every 2 hours if applicable.  Do not get wounds wet in the bath or shower unless otherwise instructed by your physician. If your wound is on your foot or leg, you may purchase a cast bag/cast cover. This may be purchased at any pharmacy or online.  Wash hands with soap and water prior to and after every dressing change.    [x]Wash wounds with: 0.9% normal saline  [x]Uzma wound Topical Treatments: Do not apply lotions, creams, or ointments to the skin around the wound bed unless directed as followed:   Apply around the wound: No-Sting barrier film         [x]Wound Location: bilateral lower legs   Apply Primary Dressing to wound: Collagen (I.e.

## 2024-05-30 NOTE — PROGRESS NOTES
MOUTH  IN THE EVENING 360 tablet 3    cyanocobalamin 1000 MCG tablet Take 1 tablet by mouth daily      ketoconazole (NIZORAL) 2 % cream       traZODone (DESYREL) 100 MG tablet Take 1 tablet by mouth nightly      metoprolol tartrate (LOPRESSOR) 25 MG tablet TAKE 1 TABLET BY MOUTH TWICE  DAILY 180 tablet 3    fenofibrate (TRICOR) 145 MG tablet Take 1 tablet by mouth daily 90 tablet 2    amiodarone (CORDARONE) 200 MG tablet Take 1 tablet by mouth daily 90 tablet 3    apixaban (ELIQUIS) 5 MG TABS tablet Take 1 tablet by mouth 2 times daily 60 tablet 0    tamsulosin (FLOMAX) 0.4 MG capsule Take 1 capsule by mouth daily 30 capsule 3    pantoprazole (PROTONIX) 40 MG tablet Take 1 tablet by mouth every morning (before breakfast) 30 tablet 3    pioglitazone (ACTOS) 45 MG tablet Take 1 tablet by mouth daily      oxyCODONE-acetaminophen (PERCOCET)  MG per tablet Take 1 tablet by mouth 4 times daily.      ATORVASTATIN CALCIUM PO Take by mouth       No current facility-administered medications on file prior to encounter.       REVIEW OF SYSTEMS    Pertinent items are noted in HPI.      Last A1c if applicable:   Hemoglobin A1C   Date Value Ref Range Status   06/13/2023 7.1 See comment % Final     Comment:     Comment:  Diagnosis of Diabetes: > or = 6.5%  Increased risk of diabetes (Prediabetes): 5.7-6.4%  Glycemic Control: Nonpregnant Adults: <7.0%                    Pregnant: <6.0%           Objective:      /66   Pulse 69   Temp 97.7 °F (36.5 °C) (Temporal)   Resp 18     /66   Pulse 69   Temp 97.7 °F (36.5 °C) (Temporal)   Resp 18     Wt Readings from Last 3 Encounters:   05/09/24 (!) 151.6 kg (334 lb 3.5 oz)   05/01/24 (!) 152.4 kg (336 lb)   04/04/24 (!) 155.8 kg (343 lb 7.6 oz)       PHYSICAL EXAM    General Appearance: alert and oriented to person, place and time, well-developed and well-nourished, in no acute distress  Pedal pulses are palpable.  There is erythema and edema right anterior leg

## 2024-06-06 ENCOUNTER — HOSPITAL ENCOUNTER (OUTPATIENT)
Dept: WOUND CARE | Age: 79
Discharge: HOME | End: 2024-06-06
Attending: PODIATRIST
Payer: MEDICARE

## 2024-06-06 VITALS
SYSTOLIC BLOOD PRESSURE: 143 MMHG | RESPIRATION RATE: 20 BRPM | HEART RATE: 65 BPM | TEMPERATURE: 96.8 F | DIASTOLIC BLOOD PRESSURE: 86 MMHG

## 2024-06-06 DIAGNOSIS — I83.222 VARICOSE VEINS OF LEFT LOWER EXTREMITY WITH INFLAMMATION, WITH ULCER OF CALF WITH FAT LAYER EXPOSED (HCC): ICD-10-CM

## 2024-06-06 DIAGNOSIS — L97.222 NON-PRESSURE CHRONIC ULCER OF LEFT CALF WITH FAT LAYER EXPOSED (HCC): Primary | ICD-10-CM

## 2024-06-06 DIAGNOSIS — L97.222 VARICOSE VEINS OF LEFT LOWER EXTREMITY WITH INFLAMMATION, WITH ULCER OF CALF WITH FAT LAYER EXPOSED (HCC): ICD-10-CM

## 2024-06-06 PROCEDURE — 6370000000 HC RX 637 (ALT 250 FOR IP): Performed by: PODIATRIST

## 2024-06-06 PROCEDURE — 11042 DBRDMT SUBQ TIS 1ST 20SQCM/<: CPT

## 2024-06-06 RX ORDER — CLOBETASOL PROPIONATE 0.5 MG/G
OINTMENT TOPICAL ONCE
OUTPATIENT
Start: 2024-06-06 | End: 2024-06-06

## 2024-06-06 RX ORDER — TRIAMCINOLONE ACETONIDE 1 MG/G
OINTMENT TOPICAL ONCE
OUTPATIENT
Start: 2024-06-06 | End: 2024-06-06

## 2024-06-06 RX ORDER — SODIUM CHLOR/HYPOCHLOROUS ACID 0.033 %
SOLUTION, IRRIGATION IRRIGATION ONCE
OUTPATIENT
Start: 2024-06-06 | End: 2024-06-06

## 2024-06-06 RX ORDER — BETAMETHASONE DIPROPIONATE 0.5 MG/G
CREAM TOPICAL ONCE
OUTPATIENT
Start: 2024-06-06 | End: 2024-06-06

## 2024-06-06 RX ORDER — LIDOCAINE HYDROCHLORIDE 20 MG/ML
JELLY TOPICAL ONCE
OUTPATIENT
Start: 2024-06-06 | End: 2024-06-06

## 2024-06-06 RX ORDER — LIDOCAINE 40 MG/G
CREAM TOPICAL ONCE
OUTPATIENT
Start: 2024-06-06 | End: 2024-06-06

## 2024-06-06 RX ORDER — LIDOCAINE 50 MG/G
OINTMENT TOPICAL ONCE
OUTPATIENT
Start: 2024-06-06 | End: 2024-06-06

## 2024-06-06 RX ORDER — IBUPROFEN 200 MG
TABLET ORAL ONCE
OUTPATIENT
Start: 2024-06-06 | End: 2024-06-06

## 2024-06-06 RX ORDER — LIDOCAINE HYDROCHLORIDE 40 MG/ML
SOLUTION TOPICAL ONCE
OUTPATIENT
Start: 2024-06-06 | End: 2024-06-06

## 2024-06-06 RX ORDER — CEPHALEXIN 500 MG/1
500 CAPSULE ORAL EVERY 8 HOURS
COMMUNITY
Start: 2024-05-31 | End: 2024-06-10

## 2024-06-06 RX ORDER — GINSENG 100 MG
CAPSULE ORAL ONCE
OUTPATIENT
Start: 2024-06-06 | End: 2024-06-06

## 2024-06-06 RX ORDER — BACITRACIN ZINC AND POLYMYXIN B SULFATE 500; 1000 [USP'U]/G; [USP'U]/G
OINTMENT TOPICAL ONCE
OUTPATIENT
Start: 2024-06-06 | End: 2024-06-06

## 2024-06-06 RX ORDER — LIDOCAINE HYDROCHLORIDE 40 MG/ML
SOLUTION TOPICAL ONCE
Status: COMPLETED | OUTPATIENT
Start: 2024-06-06 | End: 2024-06-06

## 2024-06-06 RX ORDER — GENTAMICIN SULFATE 1 MG/G
OINTMENT TOPICAL ONCE
OUTPATIENT
Start: 2024-06-06 | End: 2024-06-06

## 2024-06-06 RX ADMIN — LIDOCAINE HYDROCHLORIDE: 40 SOLUTION TOPICAL at 15:28

## 2024-06-06 NOTE — PATIENT INSTRUCTIONS
[facility]  Patient Instructions and Physician Orders  [address]  Telephone: [phone] FAX [phone]    NAME:  Kael Talbot  YOB: 1945  DATE: 6/6/2024       Return Appointment:  Return Appointment: With Blaze Olivares DPM  in  2 Week(s)  [] Return Appointment for a Wound Assessment with the nurse on:     Future Appointments   Date Time Provider Department Center   11/1/2024  1:15 PM Ruy Thomas MD [place] [facility]         Orders Placed This Encounter   Procedures    Initiate Outpatient Wound Care Protocol       Home Care Company: [facility] Phone: [phone]  Fax: [phone]      Medically necessary services for evaluation and treatment:   [x]Skilled Nursing (using clean technique) []PT (Eval & Treat) []OT (Eval & Treat) []Social Work []Dietician []Other:      Wound care instructions:  If you smoke we ask that you refrain from smoking. Smoking inhibits wounds from healing.  When taking antibiotics take the entire prescription as ordered. Do not stop taking until medication is all gone unless otherwise instructed.   Exercise as tolerated.   Keep weight off wounds and reposition every 2 hours if applicable.  Do not get wounds wet in the bath or shower unless otherwise instructed by your physician. If your wound is on your foot or leg, you may purchase a cast bag/cast cover. This may be purchased at any pharmacy or online.  Wash hands with soap and water prior to and after every dressing change.    [x]Wash wounds with: 0.9% normal saline  [x]Uzma wound Topical Treatments: Do not apply lotions, creams, or ointments to the skin around the wound bed unless directed as followed:   Apply around the wound: Nothing         [x]Wound Location: bilateral lower legs   Apply Primary Dressing to wound: Collagen (I.e. Puracol)  Secondary Dressing: 4X4 gauze pad, Bulky roll gauze, and Spandage   Avoid contact of tape with

## 2024-06-06 NOTE — PROGRESS NOTES
[facility]  Progress Note and Procedure Note      Kael Talbot  MEDICAL RECORD NUMBER:  [id number]  AGE: 78 y.o.   GENDER: male  : 1945  EPISODE DATE:  2024    Subjective:     Chief Complaint   Patient presents with    Wound Check         HISTORY of PRESENT ILLNESS HPI     Kael Talbot is a 78 y.o. male who presents today for wound/ulcer evaluation.   History of Wound Context: Patient presents today with an ulcer left leg and a new one on the right.   Wound/Ulcer Pain Timing/Severity: intermittent, mild  Quality of pain: dull  Severity:  2 / 10   Modifying Factors: Pain worsens with walking  Associated Signs/Symptoms: edema    Ulcer Identification:  Ulcer Type: venous    Contributing Factors: edema and venous stasis    Acute Wound: N/A not an acute wound        PAST MEDICAL HISTORY        Diagnosis Date    A-fib (HCC)     Diabetes mellitus (HCC)     Hyperlipidemia     Hypertension     Thyroid disease        PAST SURGICAL HISTORY    Past Surgical History:   Procedure Laterality Date    ANKLE SURGERY Left     JOINT REPLACEMENT Bilateral     knee replacements    KNEE SURGERY Right        FAMILY HISTORY    Family History   Problem Relation Age of Onset    Arthritis Other     Diabetes Other        SOCIAL HISTORY    Social History     Tobacco Use    Smoking status: Never    Smokeless tobacco: Never   Vaping Use    Vaping Use: Never used   Substance Use Topics    Alcohol use: No       ALLERGIES    No Known Allergies    MEDICATIONS    Current Outpatient Medications on File Prior to Encounter   Medication Sig Dispense Refill    MOUNJARO 7.5 MG/0.5ML SOPN SC injection Inject 0.5 mLs into the skin once a week      Compression Stockings MISC by Does not apply route 20-30 mmHg compession stockings below knee velcro 1 each 0    levothyroxine (SYNTHROID) 100 MCG tablet Take 1 tablet by mouth Daily      torsemide (DEMADEX) 20 MG tablet TAKE 2 TABLETS BY MOUTH IN THE  MORNING AND 2 TABLETS BY

## 2024-06-20 ENCOUNTER — HOSPITAL ENCOUNTER (OUTPATIENT)
Dept: WOUND CARE | Age: 79
Discharge: HOME | End: 2024-06-20
Attending: PODIATRIST
Payer: MEDICARE

## 2024-06-20 VITALS
DIASTOLIC BLOOD PRESSURE: 69 MMHG | SYSTOLIC BLOOD PRESSURE: 146 MMHG | TEMPERATURE: 97.1 F | HEART RATE: 75 BPM | RESPIRATION RATE: 18 BRPM

## 2024-06-20 DIAGNOSIS — I83.222 VARICOSE VEINS OF LEFT LOWER EXTREMITY WITH INFLAMMATION, WITH ULCER OF CALF WITH FAT LAYER EXPOSED (HCC): ICD-10-CM

## 2024-06-20 DIAGNOSIS — L97.222 VARICOSE VEINS OF LEFT LOWER EXTREMITY WITH INFLAMMATION, WITH ULCER OF CALF WITH FAT LAYER EXPOSED (HCC): ICD-10-CM

## 2024-06-20 DIAGNOSIS — L97.222 NON-PRESSURE CHRONIC ULCER OF LEFT CALF WITH FAT LAYER EXPOSED (HCC): Primary | ICD-10-CM

## 2024-06-20 PROCEDURE — 11042 DBRDMT SUBQ TIS 1ST 20SQCM/<: CPT

## 2024-06-20 PROCEDURE — 6370000000 HC RX 637 (ALT 250 FOR IP): Performed by: PODIATRIST

## 2024-06-20 RX ORDER — LIDOCAINE 40 MG/G
CREAM TOPICAL ONCE
OUTPATIENT
Start: 2024-06-20 | End: 2024-06-20

## 2024-06-20 RX ORDER — LIDOCAINE HYDROCHLORIDE 40 MG/ML
SOLUTION TOPICAL ONCE
Status: COMPLETED | OUTPATIENT
Start: 2024-06-20 | End: 2024-06-20

## 2024-06-20 RX ORDER — IBUPROFEN 200 MG
TABLET ORAL ONCE
OUTPATIENT
Start: 2024-06-20 | End: 2024-06-20

## 2024-06-20 RX ORDER — CLOBETASOL PROPIONATE 0.5 MG/G
OINTMENT TOPICAL ONCE
OUTPATIENT
Start: 2024-06-20 | End: 2024-06-20

## 2024-06-20 RX ORDER — LIDOCAINE HYDROCHLORIDE 20 MG/ML
JELLY TOPICAL ONCE
OUTPATIENT
Start: 2024-06-20 | End: 2024-06-20

## 2024-06-20 RX ORDER — GENTAMICIN SULFATE 1 MG/G
OINTMENT TOPICAL ONCE
OUTPATIENT
Start: 2024-06-20 | End: 2024-06-20

## 2024-06-20 RX ORDER — LIDOCAINE HYDROCHLORIDE 40 MG/ML
SOLUTION TOPICAL ONCE
OUTPATIENT
Start: 2024-06-20 | End: 2024-06-20

## 2024-06-20 RX ORDER — GINSENG 100 MG
CAPSULE ORAL ONCE
OUTPATIENT
Start: 2024-06-20 | End: 2024-06-20

## 2024-06-20 RX ORDER — SODIUM CHLOR/HYPOCHLOROUS ACID 0.033 %
SOLUTION, IRRIGATION IRRIGATION ONCE
OUTPATIENT
Start: 2024-06-20 | End: 2024-06-20

## 2024-06-20 RX ORDER — BETAMETHASONE DIPROPIONATE 0.5 MG/G
CREAM TOPICAL ONCE
OUTPATIENT
Start: 2024-06-20 | End: 2024-06-20

## 2024-06-20 RX ORDER — LIDOCAINE 50 MG/G
OINTMENT TOPICAL ONCE
OUTPATIENT
Start: 2024-06-20 | End: 2024-06-20

## 2024-06-20 RX ORDER — BACITRACIN ZINC AND POLYMYXIN B SULFATE 500; 1000 [USP'U]/G; [USP'U]/G
OINTMENT TOPICAL ONCE
OUTPATIENT
Start: 2024-06-20 | End: 2024-06-20

## 2024-06-20 RX ORDER — TRIAMCINOLONE ACETONIDE 1 MG/G
OINTMENT TOPICAL ONCE
OUTPATIENT
Start: 2024-06-20 | End: 2024-06-20

## 2024-06-20 RX ADMIN — LIDOCAINE HYDROCHLORIDE: 40 SOLUTION TOPICAL at 13:28

## 2024-06-20 ASSESSMENT — PAIN DESCRIPTION - LOCATION: LOCATION: GENERALIZED

## 2024-06-20 ASSESSMENT — PAIN DESCRIPTION - DESCRIPTORS: DESCRIPTORS: ACHING

## 2024-06-20 ASSESSMENT — PAIN DESCRIPTION - PAIN TYPE: TYPE: CHRONIC PAIN

## 2024-06-20 ASSESSMENT — PAIN DESCRIPTION - ORIENTATION: ORIENTATION: RIGHT;LEFT

## 2024-06-20 ASSESSMENT — PAIN DESCRIPTION - FREQUENCY: FREQUENCY: CONTINUOUS

## 2024-06-20 ASSESSMENT — PAIN SCALES - GENERAL: PAINLEVEL_OUTOF10: 6

## 2024-06-20 NOTE — PATIENT INSTRUCTIONS
[facility]  Patient Instructions and Physician Orders  [address]  Telephone: [phone] FAX [phone]    NAME:  Kael Talbot  YOB: 1945  DATE: 6/20/2024    [facility]  Phone: [phone] [address]  [place], [place] [address]         Return Appointment:  Return Appointment: With Blaze Olivares DPM  in  1 Week(s)  [] Return Appointment for a Wound Assessment with the nurse on:     Future Appointments   Date Time Provider Department Center   11/1/2024  1:15 PM Ruy Thomas MD [place] [facility]         Orders Placed This Encounter   Procedures    Initiate Outpatient Wound Care Protocol       Home Care Company: [facility] Phone: [phone]  Fax: [phone]      Medically necessary services for evaluation and treatment:   []Skilled Nursing (using clean technique) []PT (Eval & Treat) []OT (Eval & Treat) []Social Work []Dietician []Other:      Wound care instructions:  If you smoke we ask that you refrain from smoking. Smoking inhibits wounds from healing.  When taking antibiotics take the entire prescription as ordered. Do not stop taking until medication is all gone unless otherwise instructed.   Exercise as tolerated.   Keep weight off wounds and reposition every 2 hours if applicable.  Do not get wounds wet in the bath or shower unless otherwise instructed by your physician. If your wound is on your foot or leg, you may purchase a cast bag/cast cover. This may be purchased at any pharmacy or online.  Wash hands with soap and water prior to and after every dressing change.    [x]Wash wounds with: 0.9% normal saline  [x]Uzma wound Topical Treatments: Do not apply lotions, creams, or ointments to the skin around the wound bed unless directed as followed:   Apply around the wound: Nothing         [x]Wound Location: bilateral lower leg   Apply Primary Dressing to wound: Collagen (I.e.

## 2024-06-20 NOTE — PROGRESS NOTES
[facility]  Progress Note and Procedure Note      Kael Talbot  MEDICAL RECORD NUMBER:  [id number]  AGE: 78 y.o.   GENDER: male  : 1945  EPISODE DATE:  2024    Subjective:     Chief Complaint   Patient presents with    Wound Check         HISTORY of PRESENT ILLNESS HPI     Kael Talbot is a 78 y.o. male who presents today for wound/ulcer evaluation.   History of Wound Context: Patient presents today with an ulcer left leg and a new one on the right.   Wound/Ulcer Pain Timing/Severity: intermittent, mild  Quality of pain: dull  Severity:  2 / 10   Modifying Factors: Pain worsens with walking  Associated Signs/Symptoms: edema    Ulcer Identification:  Ulcer Type: venous    Contributing Factors: edema and venous stasis    Acute Wound: N/A not an acute wound        PAST MEDICAL HISTORY        Diagnosis Date    A-fib (HCC)     Diabetes mellitus (HCC)     Hyperlipidemia     Hypertension     Thyroid disease        PAST SURGICAL HISTORY    Past Surgical History:   Procedure Laterality Date    ANKLE SURGERY Left     JOINT REPLACEMENT Bilateral     knee replacements    KNEE SURGERY Right        FAMILY HISTORY    Family History   Problem Relation Age of Onset    Arthritis Other     Diabetes Other        SOCIAL HISTORY    Social History     Tobacco Use    Smoking status: Never    Smokeless tobacco: Never   Vaping Use    Vaping Use: Never used   Substance Use Topics    Alcohol use: No       ALLERGIES    No Known Allergies    MEDICATIONS    Current Outpatient Medications on File Prior to Encounter   Medication Sig Dispense Refill    MOUNJARO 7.5 MG/0.5ML SOPN SC injection Inject 0.5 mLs into the skin once a week      Compression Stockings MISC by Does not apply route 20-30 mmHg compession stockings below knee velcro 1 each 0    levothyroxine (SYNTHROID) 100 MCG tablet Take 1 tablet by mouth Daily      torsemide (DEMADEX) 20 MG tablet TAKE 2 TABLETS BY MOUTH IN THE  MORNING AND 2 TABLETS BY

## 2024-06-27 ENCOUNTER — HOSPITAL ENCOUNTER (OUTPATIENT)
Dept: WOUND CARE | Age: 79
Discharge: HOME | End: 2024-06-27
Attending: PODIATRIST
Payer: MEDICARE

## 2024-06-27 VITALS
RESPIRATION RATE: 18 BRPM | DIASTOLIC BLOOD PRESSURE: 72 MMHG | TEMPERATURE: 97.1 F | SYSTOLIC BLOOD PRESSURE: 152 MMHG | HEART RATE: 63 BPM

## 2024-06-27 DIAGNOSIS — I83.222 VARICOSE VEINS OF LEFT LOWER EXTREMITY WITH INFLAMMATION, WITH ULCER OF CALF WITH FAT LAYER EXPOSED (HCC): ICD-10-CM

## 2024-06-27 DIAGNOSIS — L97.222 NON-PRESSURE CHRONIC ULCER OF LEFT CALF WITH FAT LAYER EXPOSED (HCC): Primary | ICD-10-CM

## 2024-06-27 DIAGNOSIS — L97.222 VARICOSE VEINS OF LEFT LOWER EXTREMITY WITH INFLAMMATION, WITH ULCER OF CALF WITH FAT LAYER EXPOSED (HCC): ICD-10-CM

## 2024-06-27 PROCEDURE — 11042 DBRDMT SUBQ TIS 1ST 20SQCM/<: CPT

## 2024-06-27 PROCEDURE — 6370000000 HC RX 637 (ALT 250 FOR IP): Performed by: PODIATRIST

## 2024-06-27 RX ORDER — SODIUM CHLOR/HYPOCHLOROUS ACID 0.033 %
SOLUTION, IRRIGATION IRRIGATION ONCE
OUTPATIENT
Start: 2024-06-27 | End: 2024-06-27

## 2024-06-27 RX ORDER — BETAMETHASONE DIPROPIONATE 0.5 MG/G
CREAM TOPICAL ONCE
OUTPATIENT
Start: 2024-06-27 | End: 2024-06-27

## 2024-06-27 RX ORDER — GENTAMICIN SULFATE 1 MG/G
OINTMENT TOPICAL ONCE
OUTPATIENT
Start: 2024-06-27 | End: 2024-06-27

## 2024-06-27 RX ORDER — GINSENG 100 MG
CAPSULE ORAL ONCE
OUTPATIENT
Start: 2024-06-27 | End: 2024-06-27

## 2024-06-27 RX ORDER — CLOBETASOL PROPIONATE 0.5 MG/G
OINTMENT TOPICAL ONCE
OUTPATIENT
Start: 2024-06-27 | End: 2024-06-27

## 2024-06-27 RX ORDER — TRIAMCINOLONE ACETONIDE 1 MG/G
OINTMENT TOPICAL ONCE
OUTPATIENT
Start: 2024-06-27 | End: 2024-06-27

## 2024-06-27 RX ORDER — BACITRACIN ZINC AND POLYMYXIN B SULFATE 500; 1000 [USP'U]/G; [USP'U]/G
OINTMENT TOPICAL ONCE
OUTPATIENT
Start: 2024-06-27 | End: 2024-06-27

## 2024-06-27 RX ORDER — LIDOCAINE HYDROCHLORIDE 20 MG/ML
JELLY TOPICAL ONCE
OUTPATIENT
Start: 2024-06-27 | End: 2024-06-27

## 2024-06-27 RX ORDER — LIDOCAINE HYDROCHLORIDE 40 MG/ML
SOLUTION TOPICAL ONCE
Status: COMPLETED | OUTPATIENT
Start: 2024-06-27 | End: 2024-06-27

## 2024-06-27 RX ORDER — IBUPROFEN 200 MG
TABLET ORAL ONCE
OUTPATIENT
Start: 2024-06-27 | End: 2024-06-27

## 2024-06-27 RX ORDER — LIDOCAINE 40 MG/G
CREAM TOPICAL ONCE
OUTPATIENT
Start: 2024-06-27 | End: 2024-06-27

## 2024-06-27 RX ORDER — LIDOCAINE HYDROCHLORIDE 40 MG/ML
SOLUTION TOPICAL ONCE
OUTPATIENT
Start: 2024-06-27 | End: 2024-06-27

## 2024-06-27 RX ORDER — LIDOCAINE 50 MG/G
OINTMENT TOPICAL ONCE
OUTPATIENT
Start: 2024-06-27 | End: 2024-06-27

## 2024-06-27 RX ADMIN — LIDOCAINE HYDROCHLORIDE: 40 SOLUTION TOPICAL at 16:01

## 2024-06-27 ASSESSMENT — PAIN DESCRIPTION - ORIENTATION: ORIENTATION: RIGHT;LEFT

## 2024-06-27 ASSESSMENT — PAIN SCALES - GENERAL: PAINLEVEL_OUTOF10: 6

## 2024-06-27 ASSESSMENT — PAIN DESCRIPTION - LOCATION: LOCATION: GENERALIZED

## 2024-06-27 ASSESSMENT — PAIN DESCRIPTION - DESCRIPTORS: DESCRIPTORS: ACHING

## 2024-06-27 NOTE — PATIENT INSTRUCTIONS
[facility]  Patient Instructions and Physician Orders  [address]  Telephone: [phone] FAX [phone]    NAME:  Kael Talbot  YOB: 1945  DATE: 6/27/2024       Return Appointment:  Return Appointment: With Blaze Olivares DPM  in  2 Week(s)  [] Return Appointment for a Wound Assessment with the nurse on:     Future Appointments   Date Time Provider Department Center   11/1/2024  1:15 PM Ruy Thomas MD [place] [facility]         Orders Placed This Encounter   Procedures    Initiate Outpatient Wound Care Protocol       Home Care Company: [facility] Phone: [phone]  Fax: [phone]      Medically necessary services for evaluation and treatment:   [x]Skilled Nursing (using clean technique) []PT (Eval & Treat) []OT (Eval & Treat) []Social Work []Dietician []Other:      Wound care instructions:  If you smoke we ask that you refrain from smoking. Smoking inhibits wounds from healing.  When taking antibiotics take the entire prescription as ordered. Do not stop taking until medication is all gone unless otherwise instructed.   Exercise as tolerated.   Keep weight off wounds and reposition every 2 hours if applicable.  Do not get wounds wet in the bath or shower unless otherwise instructed by your physician. If your wound is on your foot or leg, you may purchase a cast bag/cast cover. This may be purchased at any pharmacy or online.  Wash hands with soap and water prior to and after every dressing change.    [x]Wash wounds with: 0.9% normal saline  [x]Uzma wound Topical Treatments: Do not apply lotions, creams, or ointments to the skin around the wound bed unless directed as followed:   Apply around the wound: Nothing         [x]Wound Location: RIGHT LOWER LEG   Apply Primary Dressing to wound: Collagen (I.e. Puracol)  Secondary Dressing: 4X4 gauze pad, Bulky roll gauze, and Spandage   Avoid contact of tape with skin if

## 2024-06-27 NOTE — PROGRESS NOTES
Response to treatment:  Well tolerated by patient.     Plan:       Treatment Note please see attached Discharge Instructions.  Dressed with Polymem and Spandagrip. Patient to elevate left leg and keep clean and dry.  Patient has obtained venous pumps and will begin to use them.  Return 2 weeks.    New Medication(s) at this visit:   New Prescriptions    No medications on file       Other orders at this visit:   Orders Placed This Encounter   Procedures    Initiate Outpatient Wound Care Protocol       Smoking Cessation: Counseling given: Not Answered      Written patient dismissal instructions given to patient and signed by patient or POA.           Patient Instructions     [facility]  Patient Instructions and Physician Orders  [address]  Telephone: [phone] FAX [phone]    NAME:  Kael Talbot  YOB: 1945  DATE: 6/6/2024       Return Appointment:  Return Appointment: With Blaze Olivares DPM  in  2 Week(s)  [] Return Appointment for a Wound Assessment with the nurse on:     Future Appointments   Date Time Provider Department Center   11/1/2024  1:15 PM Ruy Thomas MD [facility]         Orders Placed This Encounter   Procedures    Initiate Outpatient Wound Care Protocol       Home Care Company: [facility] Phone: [phone]  Fax: [phone]      Medically necessary services for evaluation and treatment:   [x]Skilled Nursing (using clean technique) []PT (Eval & Treat) []OT (Eval & Treat) []Social Work []Dietician []Other:      Wound care instructions:  If you smoke we ask that you refrain from smoking. Smoking inhibits wounds from healing.  When taking antibiotics take the entire prescription as ordered. Do not stop taking until medication is all gone unless otherwise instructed.   Exercise as tolerated.   Keep weight off wounds and reposition every 2 hours if applicable.  Do not get wounds wet in the

## 2024-07-09 ENCOUNTER — TELEPHONE (OUTPATIENT)
Dept: WOUND CARE | Age: 79
End: 2024-07-09

## 2024-07-09 NOTE — TELEPHONE ENCOUNTER
Returned call to Thuy in regards to pts wounds. Stated that there were two new wounds that had popped up. Placed collagen and covered with mepitel borders instead. Pt complained that the kerlix wrap does not stay up. Pt to have a f/u appt this Thursday with Dr Olivares.

## 2024-07-11 ENCOUNTER — HOSPITAL ENCOUNTER (OUTPATIENT)
Dept: WOUND CARE | Age: 79
Discharge: HOME | End: 2024-07-11
Attending: PODIATRIST
Payer: MEDICARE

## 2024-07-11 VITALS — DIASTOLIC BLOOD PRESSURE: 54 MMHG | SYSTOLIC BLOOD PRESSURE: 133 MMHG | TEMPERATURE: 97 F | HEART RATE: 66 BPM

## 2024-07-11 DIAGNOSIS — L97.222 NON-PRESSURE CHRONIC ULCER OF LEFT CALF WITH FAT LAYER EXPOSED (HCC): Primary | ICD-10-CM

## 2024-07-11 DIAGNOSIS — L97.222 VARICOSE VEINS OF LEFT LOWER EXTREMITY WITH INFLAMMATION, WITH ULCER OF CALF WITH FAT LAYER EXPOSED (HCC): ICD-10-CM

## 2024-07-11 DIAGNOSIS — I83.222 VARICOSE VEINS OF LEFT LOWER EXTREMITY WITH INFLAMMATION, WITH ULCER OF CALF WITH FAT LAYER EXPOSED (HCC): ICD-10-CM

## 2024-07-11 PROCEDURE — 11042 DBRDMT SUBQ TIS 1ST 20SQCM/<: CPT

## 2024-07-11 PROCEDURE — 6370000000 HC RX 637 (ALT 250 FOR IP): Performed by: PODIATRIST

## 2024-07-11 RX ORDER — IBUPROFEN 200 MG
TABLET ORAL ONCE
OUTPATIENT
Start: 2024-07-11 | End: 2024-07-11

## 2024-07-11 RX ORDER — GENTAMICIN SULFATE 1 MG/G
OINTMENT TOPICAL ONCE
OUTPATIENT
Start: 2024-07-11 | End: 2024-07-11

## 2024-07-11 RX ORDER — LIDOCAINE 40 MG/G
CREAM TOPICAL ONCE
OUTPATIENT
Start: 2024-07-11 | End: 2024-07-11

## 2024-07-11 RX ORDER — GINSENG 100 MG
CAPSULE ORAL ONCE
OUTPATIENT
Start: 2024-07-11 | End: 2024-07-11

## 2024-07-11 RX ORDER — LIDOCAINE 50 MG/G
OINTMENT TOPICAL ONCE
OUTPATIENT
Start: 2024-07-11 | End: 2024-07-11

## 2024-07-11 RX ORDER — SODIUM CHLOR/HYPOCHLOROUS ACID 0.033 %
SOLUTION, IRRIGATION IRRIGATION ONCE
OUTPATIENT
Start: 2024-07-11 | End: 2024-07-11

## 2024-07-11 RX ORDER — TRIAMCINOLONE ACETONIDE 1 MG/G
OINTMENT TOPICAL ONCE
OUTPATIENT
Start: 2024-07-11 | End: 2024-07-11

## 2024-07-11 RX ORDER — LIDOCAINE HYDROCHLORIDE 40 MG/ML
SOLUTION TOPICAL ONCE
Status: COMPLETED | OUTPATIENT
Start: 2024-07-11 | End: 2024-07-11

## 2024-07-11 RX ORDER — BETAMETHASONE DIPROPIONATE 0.5 MG/G
CREAM TOPICAL ONCE
OUTPATIENT
Start: 2024-07-11 | End: 2024-07-11

## 2024-07-11 RX ORDER — LIDOCAINE HYDROCHLORIDE 20 MG/ML
JELLY TOPICAL ONCE
OUTPATIENT
Start: 2024-07-11 | End: 2024-07-11

## 2024-07-11 RX ORDER — BACITRACIN ZINC AND POLYMYXIN B SULFATE 500; 1000 [USP'U]/G; [USP'U]/G
OINTMENT TOPICAL ONCE
OUTPATIENT
Start: 2024-07-11 | End: 2024-07-11

## 2024-07-11 RX ORDER — LIDOCAINE HYDROCHLORIDE 40 MG/ML
SOLUTION TOPICAL ONCE
OUTPATIENT
Start: 2024-07-11 | End: 2024-07-11

## 2024-07-11 RX ORDER — CLOBETASOL PROPIONATE 0.5 MG/G
OINTMENT TOPICAL ONCE
OUTPATIENT
Start: 2024-07-11 | End: 2024-07-11

## 2024-07-11 RX ADMIN — LIDOCAINE HYDROCHLORIDE: 40 SOLUTION TOPICAL at 14:42

## 2024-07-11 ASSESSMENT — PAIN DESCRIPTION - PAIN TYPE: TYPE: CHRONIC PAIN

## 2024-07-11 ASSESSMENT — PAIN DESCRIPTION - LOCATION: LOCATION: GENERALIZED

## 2024-07-11 ASSESSMENT — PAIN SCALES - GENERAL: PAINLEVEL_OUTOF10: 5

## 2024-07-11 ASSESSMENT — PAIN DESCRIPTION - DESCRIPTORS: DESCRIPTORS: ACHING

## 2024-07-11 ASSESSMENT — PAIN DESCRIPTION - ORIENTATION: ORIENTATION: RIGHT;LEFT

## 2024-07-11 NOTE — PROGRESS NOTES
[facility]  Progress Note and Procedure Note      Kael Talbot  MEDICAL RECORD NUMBER:  [id number]  AGE: 78 y.o.   GENDER: male  : 1945  EPISODE DATE:  2024    Subjective:     Chief Complaint   Patient presents with    Wound Check         HISTORY of PRESENT ILLNESS HPI     Kael Talbot is a 78 y.o. male who presents today for wound/ulcer evaluation.   History of Wound Context: Patient presents today with an ulcer right leg.   Wound/Ulcer Pain Timing/Severity: intermittent, mild  Quality of pain: dull  Severity:  2 / 10   Modifying Factors: Pain worsens with walking  Associated Signs/Symptoms: edema    Ulcer Identification:  Ulcer Type: venous    Contributing Factors: edema and venous stasis    Acute Wound: N/A not an acute wound        PAST MEDICAL HISTORY        Diagnosis Date    A-fib (HCC)     Diabetes mellitus (HCC)     Hyperlipidemia     Hypertension     Thyroid disease        PAST SURGICAL HISTORY    Past Surgical History:   Procedure Laterality Date    ANKLE SURGERY Left     JOINT REPLACEMENT Bilateral     knee replacements    KNEE SURGERY Right        FAMILY HISTORY    Family History   Problem Relation Age of Onset    Arthritis Other     Diabetes Other        SOCIAL HISTORY    Social History     Tobacco Use    Smoking status: Never    Smokeless tobacco: Never   Vaping Use    Vaping Use: Never used   Substance Use Topics    Alcohol use: No       ALLERGIES    No Known Allergies    MEDICATIONS    Current Outpatient Medications on File Prior to Encounter   Medication Sig Dispense Refill    MOUNJARO 7.5 MG/0.5ML SOPN SC injection Inject 0.5 mLs into the skin once a week      Compression Stockings MISC by Does not apply route 20-30 mmHg compession stockings below knee velcro 1 each 0    levothyroxine (SYNTHROID) 100 MCG tablet Take 1 tablet by mouth Daily      torsemide (DEMADEX) 20 MG tablet TAKE 2 TABLETS BY MOUTH IN THE  MORNING AND 2 TABLETS BY MOUTH  IN THE EVENING 360

## 2024-07-11 NOTE — PATIENT INSTRUCTIONS
Dressing: Home Care to do: Monday, Wednesday, and Friday          [x] Edema Control:  Apply: Medigrip on the Bilateral lower leg(s); (Single high). They should be applied to affected leg(s) from mid foot to knee making sure to cover the heel      Elevate leg(s) above the level of the heart for 30 minutes 4-5 times a day and/or when sitting.  Avoid prolonged standing in one place.    [x] Lymphedema Therapy:  Wear Lymphedema pumps twice a day at settings prescribed by your physician.       Dietary:  Important dietary reminders:  1. Increase Protein intake (i.e. Lean meats, fish, eggs, legumes, and yogurt)  2. No added salt  3. If diabetic, follow a diabetic diet and check glucose prior to meals or as instructed by your physician.    Dietary Supplements(Take twice a day unless instructed otherwise):  [] Justin  [] 30ml ProStat [] Ensure Complete [] Ensure Max/Premier [] Expedite [] Other:    Your nurse  is:  Linda     Electronically signed by Joan Bustos RN on 7/11/2024 at 3:17 PM     Wound Care Center Information: Should you experience any significant changes in your wound(s) or have questions about your wound care, please contact the [facility] at [phone].   Hours of operation:  Mon:  8AM - 2PM  Tue: 11AM - 5PM  Wed: CLOSED  Thur: 8AM - 4:30PM  Fri:  8AM - 4:30PM  The office is closed on all major holidays.    Please give us 24-48 business hours to return your call.  These hours of operation are subject to change. If you need help with your wounds and cannot wait until we are available, contact your PCP or go to your preferred emergency room.     Call your doctor now or seek immediate medical care if:    You have symptoms of infection, such as:  Increased pain, swelling, warmth, or redness.  Red streaks leading from the area.  Pus draining from the area.  A fever.     Patient Experience    Thank you for trusting us with your care.  You may receive a survey from a company

## 2024-07-19 NOTE — TELEPHONE ENCOUNTER
Requested Prescriptions     Pending Prescriptions Disp Refills    metoprolol tartrate (LOPRESSOR) 25 MG tablet [Pharmacy Med Name: Metoprolol Tartrate 25 MG Oral Tablet] 180 tablet 3     Sig: TAKE 1 TABLET BY MOUTH TWICE  DAILY            Checked Correct Pharmacy: Yes    Any changes since last refill? No     Number: 180    Refills: 3    Last Office Visit: 5/1/2024     Next Office Visit: 11/1/2024

## 2024-07-25 ENCOUNTER — HOSPITAL ENCOUNTER (OUTPATIENT)
Dept: WOUND CARE | Age: 79
Discharge: HOME | End: 2024-07-25
Attending: PODIATRIST
Payer: MEDICARE

## 2024-07-25 VITALS
RESPIRATION RATE: 18 BRPM | TEMPERATURE: 96.9 F | DIASTOLIC BLOOD PRESSURE: 69 MMHG | HEART RATE: 78 BPM | SYSTOLIC BLOOD PRESSURE: 139 MMHG

## 2024-07-25 DIAGNOSIS — L97.222 NON-PRESSURE CHRONIC ULCER OF LEFT CALF WITH FAT LAYER EXPOSED (HCC): Primary | ICD-10-CM

## 2024-07-25 DIAGNOSIS — I83.222 VARICOSE VEINS OF LEFT LOWER EXTREMITY WITH INFLAMMATION, WITH ULCER OF CALF WITH FAT LAYER EXPOSED (HCC): ICD-10-CM

## 2024-07-25 DIAGNOSIS — L97.222 VARICOSE VEINS OF LEFT LOWER EXTREMITY WITH INFLAMMATION, WITH ULCER OF CALF WITH FAT LAYER EXPOSED (HCC): ICD-10-CM

## 2024-07-25 PROCEDURE — 6370000000 HC RX 637 (ALT 250 FOR IP): Performed by: PODIATRIST

## 2024-07-25 PROCEDURE — 11045 DBRDMT SUBQ TISS EACH ADDL: CPT

## 2024-07-25 PROCEDURE — 11042 DBRDMT SUBQ TIS 1ST 20SQCM/<: CPT

## 2024-07-25 RX ORDER — GINSENG 100 MG
CAPSULE ORAL ONCE
OUTPATIENT
Start: 2024-07-25 | End: 2024-07-25

## 2024-07-25 RX ORDER — LIDOCAINE HYDROCHLORIDE 40 MG/ML
SOLUTION TOPICAL ONCE
OUTPATIENT
Start: 2024-07-25 | End: 2024-07-25

## 2024-07-25 RX ORDER — IBUPROFEN 200 MG
TABLET ORAL ONCE
OUTPATIENT
Start: 2024-07-25 | End: 2024-07-25

## 2024-07-25 RX ORDER — LIDOCAINE HYDROCHLORIDE 20 MG/ML
JELLY TOPICAL ONCE
OUTPATIENT
Start: 2024-07-25 | End: 2024-07-25

## 2024-07-25 RX ORDER — LIDOCAINE HYDROCHLORIDE 40 MG/ML
SOLUTION TOPICAL ONCE
Status: COMPLETED | OUTPATIENT
Start: 2024-07-25 | End: 2024-07-25

## 2024-07-25 RX ORDER — TRIAMCINOLONE ACETONIDE 1 MG/G
OINTMENT TOPICAL ONCE
OUTPATIENT
Start: 2024-07-25 | End: 2024-07-25

## 2024-07-25 RX ORDER — GENTAMICIN SULFATE 1 MG/G
OINTMENT TOPICAL ONCE
OUTPATIENT
Start: 2024-07-25 | End: 2024-07-25

## 2024-07-25 RX ORDER — CLOBETASOL PROPIONATE 0.5 MG/G
OINTMENT TOPICAL ONCE
OUTPATIENT
Start: 2024-07-25 | End: 2024-07-25

## 2024-07-25 RX ORDER — BACITRACIN ZINC AND POLYMYXIN B SULFATE 500; 1000 [USP'U]/G; [USP'U]/G
OINTMENT TOPICAL ONCE
OUTPATIENT
Start: 2024-07-25 | End: 2024-07-25

## 2024-07-25 RX ORDER — BETAMETHASONE DIPROPIONATE 0.5 MG/G
CREAM TOPICAL ONCE
OUTPATIENT
Start: 2024-07-25 | End: 2024-07-25

## 2024-07-25 RX ORDER — SODIUM CHLOR/HYPOCHLOROUS ACID 0.033 %
SOLUTION, IRRIGATION IRRIGATION ONCE
OUTPATIENT
Start: 2024-07-25 | End: 2024-07-25

## 2024-07-25 RX ORDER — LIDOCAINE 40 MG/G
CREAM TOPICAL ONCE
OUTPATIENT
Start: 2024-07-25 | End: 2024-07-25

## 2024-07-25 RX ORDER — LIDOCAINE 50 MG/G
OINTMENT TOPICAL ONCE
OUTPATIENT
Start: 2024-07-25 | End: 2024-07-25

## 2024-07-25 RX ADMIN — LIDOCAINE HYDROCHLORIDE: 40 SOLUTION TOPICAL at 15:47

## 2024-07-25 NOTE — PROGRESS NOTES
office is closed on all major holidays.    Please give us 24-48 business hours to return your call.  These hours of operation are subject to change. If you need help with your wounds and cannot wait until we are available, contact your PCP or go to your preferred emergency room.     Call your doctor now or seek immediate medical care if:    You have symptoms of infection, such as:  Increased pain, swelling, warmth, or redness.  Red streaks leading from the area.  Pus draining from the area.  A fever.             Electronically signed by Blaze Olivares DPM on 5/30/2024 at 3:25 PM

## 2024-07-25 NOTE — PATIENT INSTRUCTIONS
[facility]  Patient Instructions and Physician Orders  [address] [place]  Telephone: [phone] FAX [phone]    NAME:  Kael Talbot  YOB: 1945  DATE: 7/25/2024       Return Appointment:  Return Appointment: With Blaze Olivares DPM  in  2 Week(s)  [] Return Appointment for a Wound Assessment with the nurse on:     Future Appointments   Date Time Provider Department Center   11/1/2024  1:15 PM Ryu Thomas MD [facility]         Orders Placed This Encounter   Procedures    Initiate Outpatient Wound Care Protocol       Home Care Company: [facility] Phone: [phone]  Fax: [phone]      Medically necessary services for evaluation and treatment:   [x]Skilled Nursing (using clean technique) []PT (Eval & Treat) []OT (Eval & Treat) []Social Work []Dietician []Other:      Wound care instructions:  If you smoke we ask that you refrain from smoking. Smoking inhibits wounds from healing.  When taking antibiotics take the entire prescription as ordered. Do not stop taking until medication is all gone unless otherwise instructed.   Exercise as tolerated.   Keep weight off wounds and reposition every 2 hours if applicable.  Do not get wounds wet in the bath or shower unless otherwise instructed by your physician. If your wound is on your foot or leg, you may purchase a cast bag/cast cover. This may be purchased at any pharmacy or online.  Wash hands with soap and water prior to and after every dressing change.    [x]Wash wounds with: 0.9% normal saline  [x]Uzma wound Topical Treatments: Do not apply lotions, creams, or ointments to the skin around the wound bed unless directed as followed:   Apply around the wound: Nothing         [x]Wound Location: left lower leg   Apply Primary Dressing to wound: Collagen (I.e. Puracol)  Secondary Dressing: 4X4 gauze pad, Conforming roll gauze, and Spandage   Avoid contact of tape with skin if

## 2024-08-05 RX ORDER — APIXABAN 5 MG/1
5 TABLET, FILM COATED ORAL 2 TIMES DAILY
Qty: 180 TABLET | Refills: 1 | Status: SHIPPED | OUTPATIENT
Start: 2024-08-05

## 2024-08-08 ENCOUNTER — HOSPITAL ENCOUNTER (OUTPATIENT)
Dept: WOUND CARE | Age: 79
Discharge: HOME | End: 2024-08-08
Attending: PODIATRIST
Payer: MEDICARE

## 2024-08-08 VITALS
HEIGHT: 71 IN | SYSTOLIC BLOOD PRESSURE: 119 MMHG | RESPIRATION RATE: 18 BRPM | TEMPERATURE: 98.1 F | HEART RATE: 63 BPM | WEIGHT: 315 LBS | BODY MASS INDEX: 44.1 KG/M2 | DIASTOLIC BLOOD PRESSURE: 71 MMHG

## 2024-08-08 DIAGNOSIS — L97.222 VARICOSE VEINS OF LEFT LOWER EXTREMITY WITH INFLAMMATION, WITH ULCER OF CALF WITH FAT LAYER EXPOSED (HCC): ICD-10-CM

## 2024-08-08 DIAGNOSIS — L97.222 NON-PRESSURE CHRONIC ULCER OF LEFT CALF WITH FAT LAYER EXPOSED (HCC): Primary | ICD-10-CM

## 2024-08-08 DIAGNOSIS — I83.222 VARICOSE VEINS OF LEFT LOWER EXTREMITY WITH INFLAMMATION, WITH ULCER OF CALF WITH FAT LAYER EXPOSED (HCC): ICD-10-CM

## 2024-08-08 PROCEDURE — 99213 OFFICE O/P EST LOW 20 MIN: CPT

## 2024-08-08 RX ORDER — CLOBETASOL PROPIONATE 0.5 MG/G
OINTMENT TOPICAL ONCE
Status: CANCELLED | OUTPATIENT
Start: 2024-08-08 | End: 2024-08-08

## 2024-08-08 RX ORDER — LIDOCAINE HYDROCHLORIDE 20 MG/ML
JELLY TOPICAL ONCE
Status: CANCELLED | OUTPATIENT
Start: 2024-08-08 | End: 2024-08-08

## 2024-08-08 RX ORDER — TIRZEPATIDE 10 MG/.5ML
10 INJECTION, SOLUTION SUBCUTANEOUS WEEKLY
COMMUNITY
Start: 2024-08-02

## 2024-08-08 RX ORDER — LIDOCAINE HYDROCHLORIDE 40 MG/ML
SOLUTION TOPICAL ONCE
Status: CANCELLED | OUTPATIENT
Start: 2024-08-08 | End: 2024-08-08

## 2024-08-08 RX ORDER — IBUPROFEN 200 MG
TABLET ORAL ONCE
Status: CANCELLED | OUTPATIENT
Start: 2024-08-08 | End: 2024-08-08

## 2024-08-08 RX ORDER — GENTAMICIN SULFATE 1 MG/G
OINTMENT TOPICAL ONCE
Status: CANCELLED | OUTPATIENT
Start: 2024-08-08 | End: 2024-08-08

## 2024-08-08 RX ORDER — TRIAMCINOLONE ACETONIDE 1 MG/G
OINTMENT TOPICAL ONCE
Status: CANCELLED | OUTPATIENT
Start: 2024-08-08 | End: 2024-08-08

## 2024-08-08 RX ORDER — LIDOCAINE 40 MG/G
CREAM TOPICAL ONCE
Status: CANCELLED | OUTPATIENT
Start: 2024-08-08 | End: 2024-08-08

## 2024-08-08 RX ORDER — GINSENG 100 MG
CAPSULE ORAL ONCE
Status: CANCELLED | OUTPATIENT
Start: 2024-08-08 | End: 2024-08-08

## 2024-08-08 RX ORDER — SODIUM CHLOR/HYPOCHLOROUS ACID 0.033 %
SOLUTION, IRRIGATION IRRIGATION ONCE
Status: CANCELLED | OUTPATIENT
Start: 2024-08-08 | End: 2024-08-08

## 2024-08-08 RX ORDER — BETAMETHASONE DIPROPIONATE 0.5 MG/G
CREAM TOPICAL ONCE
Status: CANCELLED | OUTPATIENT
Start: 2024-08-08 | End: 2024-08-08

## 2024-08-08 RX ORDER — CHLORHEXIDINE GLUCONATE ORAL RINSE 1.2 MG/ML
SOLUTION DENTAL DAILY
COMMUNITY
Start: 2024-07-05

## 2024-08-08 RX ORDER — LIDOCAINE HYDROCHLORIDE 40 MG/ML
SOLUTION TOPICAL ONCE
Status: DISCONTINUED | OUTPATIENT
Start: 2024-08-08 | End: 2024-08-09 | Stop reason: HOSPADM

## 2024-08-08 RX ORDER — SIMVASTATIN 20 MG
20 TABLET ORAL NIGHTLY
COMMUNITY
Start: 2024-06-20

## 2024-08-08 RX ORDER — BACITRACIN ZINC AND POLYMYXIN B SULFATE 500; 1000 [USP'U]/G; [USP'U]/G
OINTMENT TOPICAL ONCE
Status: CANCELLED | OUTPATIENT
Start: 2024-08-08 | End: 2024-08-08

## 2024-08-08 RX ORDER — LIDOCAINE 50 MG/G
OINTMENT TOPICAL ONCE
Status: CANCELLED | OUTPATIENT
Start: 2024-08-08 | End: 2024-08-08

## 2024-08-08 NOTE — PATIENT INSTRUCTIONS
DISCHARGE INSTRUCTIONS  [facility]   [address]  Telephone: [phone] FAX [phone]    NAME:  Kael Talbot  YOB: 1945  MEDICAL RECORD NUMBER:  [id number]  DATE:  8/8/2024      Congratulations! You have completed your treatment program!    To prevent Venous Wounds from recurring again:  Elevate your legs at least parallel to the ground while sitting.  Wear your prescription support stockings everyday and remove at night while you sleep.  Replace your stockings every 3-6 months so that your legs continue to get the support they need.  Inspect your legs and feet daily and report any increased swelling, unusual redness or new wounds to your physician.  Wash your legs and feet regularly with mild soap and water and moisturize daily.  Continue to use compression pumps if prescribed by your physician.  Avoid overeating. Extra weight adds pressure on the veins in your legs.  Limit salty foods as they promote swelling or fluid retention in your legs.      Additional instructions for healed wound/skin care: healed ; may discontinue homecare      Call the Wound Care Center if your wound reopens, if new wounds occur, or if you have any other questions about your follow up care [phone].     [] Patient unable to sign Discharge Instructions given to ECF/Transportation/POA    Electronically signed by Joan Bustos RN on 8/8/2024 at 3:52 PM

## 2024-08-08 NOTE — PROGRESS NOTES
Compression Stockings MISC by Does not apply route 20-30 mmHg compession stockings below knee velcro (Patient not taking: Reported on 6/6/2024) 1 each 0    levothyroxine (SYNTHROID) 100 MCG tablet Take 1 tablet by mouth Daily      torsemide (DEMADEX) 20 MG tablet TAKE 2 TABLETS BY MOUTH IN THE  MORNING AND 2 TABLETS BY MOUTH  IN THE EVENING 360 tablet 3    cyanocobalamin 1000 MCG tablet Take 1 tablet by mouth daily      ketoconazole (NIZORAL) 2 % cream       traZODone (DESYREL) 100 MG tablet Take 1 tablet by mouth nightly      fenofibrate (TRICOR) 145 MG tablet Take 1 tablet by mouth daily 90 tablet 2    amiodarone (CORDARONE) 200 MG tablet Take 1 tablet by mouth daily 90 tablet 3    tamsulosin (FLOMAX) 0.4 MG capsule Take 1 capsule by mouth daily 30 capsule 3    pantoprazole (PROTONIX) 40 MG tablet Take 1 tablet by mouth every morning (before breakfast) 30 tablet 3    pioglitazone (ACTOS) 45 MG tablet Take 1 tablet by mouth daily      oxyCODONE-acetaminophen (PERCOCET)  MG per tablet Take 1 tablet by mouth 4 times daily.       No current facility-administered medications on file prior to encounter.       REVIEW OF SYSTEMS    Pertinent items are noted in HPI.      Last A1c if applicable:   Hemoglobin A1C   Date Value Ref Range Status   06/13/2023 7.1 See comment % Final     Comment:     Comment:  Diagnosis of Diabetes: > or = 6.5%  Increased risk of diabetes (Prediabetes): 5.7-6.4%  Glycemic Control: Nonpregnant Adults: <7.0%                    Pregnant: <6.0%           Objective:      /71   Pulse 63   Temp 98.1 °F (36.7 °C) (Temporal)   Resp 18   Ht 1.803 m (5' 11\")   Wt (!) 149.1 kg (328 lb 11.3 oz)   BMI 45.85 kg/m²     /71   Pulse 63   Temp 98.1 °F (36.7 °C) (Temporal)   Resp 18   Ht 1.803 m (5' 11\")   Wt (!) 149.1 kg (328 lb 11.3 oz)   BMI 45.85 kg/m²     Wt Readings from Last 3 Encounters:   08/08/24 (!) 149.1 kg (328 lb 11.3 oz)   05/09/24 (!) 151.6 kg (334 lb 3.5 oz)

## 2024-10-16 ENCOUNTER — TELEPHONE (OUTPATIENT)
Dept: CARDIOLOGY CLINIC | Age: 79
End: 2024-10-16

## 2024-10-16 NOTE — TELEPHONE ENCOUNTER
Called and left patient a voicemail to please give me a call back to discuss setting him up with a new cardiologist since  is no longer with us.

## 2024-10-16 NOTE — TELEPHONE ENCOUNTER
Kael called on our overnight answering service on 10/15/24 @ 5:25 pm to ask if Dr. Thomas can help him find another Cardiologist since  is stepping down.    Can be reached at  [phone]

## 2025-02-07 ENCOUNTER — OFFICE VISIT (OUTPATIENT)
Dept: CARDIOLOGY CLINIC | Age: 80
End: 2025-02-07
Payer: MEDICARE

## 2025-02-07 VITALS
WEIGHT: 289 LBS | DIASTOLIC BLOOD PRESSURE: 70 MMHG | BODY MASS INDEX: 40.31 KG/M2 | SYSTOLIC BLOOD PRESSURE: 110 MMHG | HEART RATE: 70 BPM

## 2025-02-07 DIAGNOSIS — I50.32 CHRONIC DIASTOLIC CONGESTIVE HEART FAILURE (HCC): ICD-10-CM

## 2025-02-07 DIAGNOSIS — I48.91 ATRIAL FIBRILLATION WITH RVR (HCC): Primary | ICD-10-CM

## 2025-02-07 PROCEDURE — 99215 OFFICE O/P EST HI 40 MIN: CPT | Performed by: INTERNAL MEDICINE

## 2025-02-07 PROCEDURE — 93000 ELECTROCARDIOGRAM COMPLETE: CPT | Performed by: INTERNAL MEDICINE

## 2025-02-07 PROCEDURE — G8427 DOCREV CUR MEDS BY ELIG CLIN: HCPCS | Performed by: INTERNAL MEDICINE

## 2025-02-07 PROCEDURE — 1036F TOBACCO NON-USER: CPT | Performed by: INTERNAL MEDICINE

## 2025-02-07 PROCEDURE — 1159F MED LIST DOCD IN RCRD: CPT | Performed by: INTERNAL MEDICINE

## 2025-02-07 PROCEDURE — 1124F ACP DISCUSS-NO DSCNMKR DOCD: CPT | Performed by: INTERNAL MEDICINE

## 2025-02-07 PROCEDURE — G8417 CALC BMI ABV UP PARAM F/U: HCPCS | Performed by: INTERNAL MEDICINE

## 2025-02-07 RX ORDER — METOPROLOL TARTRATE 50 MG
50 TABLET ORAL 2 TIMES DAILY
Qty: 180 TABLET | Refills: 3 | Status: SHIPPED | OUTPATIENT
Start: 2025-02-07

## 2025-02-07 NOTE — PROGRESS NOTES
Cc: PAFRVR, HFpEF    HPI:     Patient is an 79-year-old man with morbid obesity, HFpEF, PAF RVR status post DC cardioversion 09/2021, CKD.    Echo 07/2021: Mild LVH, LVEF 55%, indeterminate diastolic function, mild MR.    Nuclear stress test 08/2021: Normal perfusion, LVEF 48%, apical hypokinesis.    ECG 5/1/2024: NSR, low voltage.    Patient returns to the clinic today for follow-up.  He has lost about 70 pounds on Mounjaro.  He reports no concerning ischemic or congestive symptoms, denies any palpitations.  Labs from 12/2024 were reviewed, creatinine 1.8 baseline, K4.6.    ECG 2/7/2025: AF  bpm, low voltage.      Histories     Past Medical History:   has a past medical history of A-fib (HCC), Diabetes mellitus (HCC), Hyperlipidemia, Hypertension, and Thyroid disease.    Surgical History:   has a past surgical history that includes Ankle surgery (Left); knee surgery (Right); and joint replacement (Bilateral).     Social History:   reports that he has never smoked. He has never used smokeless tobacco. He reports that he does not drink alcohol.     Family History:  No evidence for sudden cardiac death or premature CAD      Medications:     Home medications were reviewed and are listed below    Prior to Admission medications    Medication Sig Start Date End Date Taking? Authorizing Provider   metoprolol tartrate (LOPRESSOR) 50 MG tablet Take 1 tablet by mouth 2 times daily 2/7/25  Yes Aldo Benoit MD   chlorhexidine (PERIDEX) 0.12 % solution Swish and spit daily 7/5/24  Yes Provider, Historical, MD   MOUNJARO 10 MG/0.5ML SOPN SC injection Inject 0.5 mLs into the skin once a week 8/2/24  Yes ProviderTeofilo MD   simvastatin (ZOCOR) 20 MG tablet Take 1 tablet by mouth nightly 6/20/24  Yes ProviderTeofilo MD   ELIQUIS 5 MG TABS tablet TAKE 1 TABLET BY MOUTH TWICE  DAILY 8/5/24  Yes Tayler Dalal APRN - CNP   Compression Stockings MISC by Does not apply route 20-30 mmHg compession stockings below

## 2025-02-19 RX ORDER — AMIODARONE HYDROCHLORIDE 200 MG/1
200 TABLET ORAL DAILY
Qty: 90 TABLET | Refills: 3 | Status: SHIPPED | OUTPATIENT
Start: 2025-02-19

## 2025-02-19 NOTE — TELEPHONE ENCOUNTER
I Medication Refills:    Medication  amiodarone (CORDARONE) 200 MG tablet [9066]  amiodarone (CORDARONE) 200 MG tablet [[id number]]    Order Details  Dose: 200 mg Route: Oral Frequency: DAILY   Dispense Quantity: 90 tablet Refills: 3          Sig: Take 1 tablet by mouth daily     [facility] - [address] [phone] - F [phone] [[id number]]       Last Office Visit: 02/26/25    Next Office Visit: 02/07/25    Last Refill: 10/11/21    Last Labs: 12/02/24

## 2025-02-26 ENCOUNTER — OFFICE VISIT (OUTPATIENT)
Dept: CARDIOLOGY CLINIC | Age: 80
End: 2025-02-26
Payer: MEDICARE

## 2025-02-26 VITALS
BODY MASS INDEX: 45.89 KG/M2 | HEART RATE: 71 BPM | DIASTOLIC BLOOD PRESSURE: 78 MMHG | WEIGHT: 315 LBS | SYSTOLIC BLOOD PRESSURE: 132 MMHG

## 2025-02-26 DIAGNOSIS — I10 PRIMARY HYPERTENSION: ICD-10-CM

## 2025-02-26 DIAGNOSIS — E78.2 MIXED HYPERLIPIDEMIA: ICD-10-CM

## 2025-02-26 DIAGNOSIS — I48.19 PERSISTENT ATRIAL FIBRILLATION (HCC): ICD-10-CM

## 2025-02-26 DIAGNOSIS — I48.91 ATRIAL FIBRILLATION WITH RVR (HCC): Primary | ICD-10-CM

## 2025-02-26 DIAGNOSIS — I50.31 ACUTE HEART FAILURE WITH PRESERVED EJECTION FRACTION (HFPEF) (HCC): ICD-10-CM

## 2025-02-26 PROCEDURE — 1159F MED LIST DOCD IN RCRD: CPT | Performed by: NURSE PRACTITIONER

## 2025-02-26 PROCEDURE — G8427 DOCREV CUR MEDS BY ELIG CLIN: HCPCS | Performed by: NURSE PRACTITIONER

## 2025-02-26 PROCEDURE — 1124F ACP DISCUSS-NO DSCNMKR DOCD: CPT | Performed by: NURSE PRACTITIONER

## 2025-02-26 PROCEDURE — 3078F DIAST BP <80 MM HG: CPT | Performed by: NURSE PRACTITIONER

## 2025-02-26 PROCEDURE — 93000 ELECTROCARDIOGRAM COMPLETE: CPT | Performed by: NURSE PRACTITIONER

## 2025-02-26 PROCEDURE — 3075F SYST BP GE 130 - 139MM HG: CPT | Performed by: NURSE PRACTITIONER

## 2025-02-26 PROCEDURE — G8417 CALC BMI ABV UP PARAM F/U: HCPCS | Performed by: NURSE PRACTITIONER

## 2025-02-26 PROCEDURE — 1036F TOBACCO NON-USER: CPT | Performed by: NURSE PRACTITIONER

## 2025-02-26 PROCEDURE — 99215 OFFICE O/P EST HI 40 MIN: CPT | Performed by: NURSE PRACTITIONER

## 2025-02-26 RX ORDER — POLYMYXIN B SULFATE AND TRIMETHOPRIM 1; 10000 MG/ML; [USP'U]/ML
SOLUTION OPHTHALMIC
COMMUNITY
Start: 2025-02-07

## 2025-02-26 RX ORDER — METOPROLOL TARTRATE 50 MG
50 TABLET ORAL 2 TIMES DAILY
Qty: 180 TABLET | Refills: 3 | Status: SHIPPED | OUTPATIENT
Start: 2025-02-26

## 2025-02-26 RX ORDER — METOPROLOL TARTRATE 25 MG/1
25 TABLET, FILM COATED ORAL 2 TIMES DAILY
Qty: 180 TABLET | Refills: 3 | Status: SHIPPED | OUTPATIENT
Start: 2025-02-26

## 2025-02-26 NOTE — PROGRESS NOTES
[place] [address]  [phone]    Primary Cardiologist: Dr Benoit     CC AF RVR    HPI:  79 y.o. with HFpEF, pAF, HTN, HLD who was seen by Dr Benoit 2 weeks ago and ECG showed AF RVR and metoprolol increased to 50 mg po bid and other meds continued.     ECG today showed AF HR . He has rare episodes of palpitations but denies LH/dizziness or syncope. He notes increased sob, abdominal bloating and LE edema. He notes increase salt in diet. He struggles with chronic constipation related to chronic narcotics d/t chronic pain.     No c/o cp, sob, orthopnea, n/v/d, fever or GI/ bleeding.     Past Medical History:   Diagnosis Date    A-fib (HCC)     Diabetes mellitus (HCC)     Hyperlipidemia     Hypertension     Thyroid disease      Past Surgical History:   Procedure Laterality Date    ANKLE SURGERY Left     JOINT REPLACEMENT Bilateral     knee replacements    KNEE SURGERY Right      Family History   Problem Relation Age of Onset    Arthritis Other     Diabetes Other      Social History     Tobacco Use    Smoking status: Never    Smokeless tobacco: Never   Vaping Use    Vaping status: Never Used   Substance Use Topics    Alcohol use: No     Allergies:Patient has no known allergies.    Review of Systems  All 12 point review of symptoms completed. Pertinent positives identified in the HPI, all other review of symptoms negative.    Physical Exam:  /78 (Site: Left Lower Arm, Position: Sitting, Cuff Size: Medium Adult)   Pulse 71   Wt (!) 149.2 kg (329 lb)   BMI 45.89 kg/m²    General (appearance):  No acute distress  Eyes: anicteric   Neck: soft, No JVD  Ears/Nose/Mouth/Thorat: No cyanosis  CV: Irreg, tachy   Respiratory:  diminished  GI: soft, non-tender, non-distended  Skin: Warm, dry. No rashes  Neuro/Psych: Alert and oriented x 3. Appropriate behavior  Ext:  No c/c. + BLE edema    Weight  Wt Readings from Last 3 Encounters:   02/07/25 131.1 kg (289 lb)   08/08/24

## 2025-02-26 NOTE — PATIENT INSTRUCTIONS
Take additional torsemide 20 mg for the next 3 days for increased edema and shortness of breath    Increase toprol to 75 mg twice a day     Follow up in 2 weeks

## 2025-03-13 ENCOUNTER — OFFICE VISIT (OUTPATIENT)
Dept: CARDIOLOGY CLINIC | Age: 80
End: 2025-03-13
Payer: MEDICARE

## 2025-03-13 VITALS
WEIGHT: 315 LBS | BODY MASS INDEX: 46.3 KG/M2 | SYSTOLIC BLOOD PRESSURE: 128 MMHG | HEART RATE: 98 BPM | DIASTOLIC BLOOD PRESSURE: 70 MMHG

## 2025-03-13 DIAGNOSIS — I50.32 CHRONIC HEART FAILURE WITH PRESERVED EJECTION FRACTION (HFPEF) (HCC): ICD-10-CM

## 2025-03-13 DIAGNOSIS — I10 PRIMARY HYPERTENSION: ICD-10-CM

## 2025-03-13 DIAGNOSIS — E78.2 MIXED HYPERLIPIDEMIA: ICD-10-CM

## 2025-03-13 DIAGNOSIS — I48.19 PERSISTENT ATRIAL FIBRILLATION (HCC): Primary | ICD-10-CM

## 2025-03-13 PROCEDURE — 1159F MED LIST DOCD IN RCRD: CPT | Performed by: NURSE PRACTITIONER

## 2025-03-13 PROCEDURE — 1036F TOBACCO NON-USER: CPT | Performed by: NURSE PRACTITIONER

## 2025-03-13 PROCEDURE — 1124F ACP DISCUSS-NO DSCNMKR DOCD: CPT | Performed by: NURSE PRACTITIONER

## 2025-03-13 PROCEDURE — 93000 ELECTROCARDIOGRAM COMPLETE: CPT | Performed by: NURSE PRACTITIONER

## 2025-03-13 PROCEDURE — 3074F SYST BP LT 130 MM HG: CPT | Performed by: NURSE PRACTITIONER

## 2025-03-13 PROCEDURE — 99214 OFFICE O/P EST MOD 30 MIN: CPT | Performed by: NURSE PRACTITIONER

## 2025-03-13 PROCEDURE — G8417 CALC BMI ABV UP PARAM F/U: HCPCS | Performed by: NURSE PRACTITIONER

## 2025-03-13 PROCEDURE — G8427 DOCREV CUR MEDS BY ELIG CLIN: HCPCS | Performed by: NURSE PRACTITIONER

## 2025-03-13 PROCEDURE — 3078F DIAST BP <80 MM HG: CPT | Performed by: NURSE PRACTITIONER

## 2025-03-13 RX ORDER — METOPROLOL TARTRATE 100 MG/1
100 TABLET ORAL 2 TIMES DAILY
Qty: 180 TABLET | Refills: 3 | Status: SHIPPED | OUTPATIENT
Start: 2025-03-13

## 2025-03-13 NOTE — PROGRESS NOTES
tricuspid regurgitation.    Medications:   Current Outpatient Medications   Medication Sig Dispense Refill    trimethoprim-polymyxin b (POLYTRIM) 24423-5.1 UNIT/ML-% ophthalmic solution INSTILL 1 DROP EVERY 4 HOURS IN RIGHT EYE FOR 5 DAYS      metoprolol tartrate (LOPRESSOR) 50 MG tablet Take 1 tablet by mouth 2 times daily Take in addition to the 25 mg BID for a total of 75 mg  tablet 3    metoprolol tartrate (LOPRESSOR) 25 MG tablet Take 1 tablet by mouth 2 times daily Take in addition to the 50 mg BID for a total of 75 mg  tablet 3    amiodarone (CORDARONE) 200 MG tablet Take 1 tablet by mouth daily 90 tablet 3    chlorhexidine (PERIDEX) 0.12 % solution Swish and spit daily      MOUNJARO 10 MG/0.5ML SOPN SC injection Inject 0.5 mLs into the skin once a week      simvastatin (ZOCOR) 20 MG tablet Take 1 tablet by mouth nightly      ELIQUIS 5 MG TABS tablet TAKE 1 TABLET BY MOUTH TWICE  DAILY 180 tablet 1    Compression Stockings MISC by Does not apply route 20-30 mmHg compession stockings below knee velcro 1 each 0    levothyroxine (SYNTHROID) 100 MCG tablet Take 1 tablet by mouth Daily      torsemide (DEMADEX) 20 MG tablet TAKE 2 TABLETS BY MOUTH IN THE  MORNING AND 2 TABLETS BY MOUTH  IN THE EVENING 360 tablet 3    cyanocobalamin 1000 MCG tablet Take 1 tablet by mouth daily (Patient not taking: Reported on 2/26/2025)      ketoconazole (NIZORAL) 2 % cream       traZODone (DESYREL) 100 MG tablet Take 1 tablet by mouth nightly      fenofibrate (TRICOR) 145 MG tablet Take 1 tablet by mouth daily 90 tablet 2    tamsulosin (FLOMAX) 0.4 MG capsule Take 1 capsule by mouth daily 30 capsule 3    pantoprazole (PROTONIX) 40 MG tablet Take 1 tablet by mouth every morning (before breakfast) 30 tablet 3    pioglitazone (ACTOS) 45 MG tablet Take 1 tablet by mouth daily      oxyCODONE-acetaminophen (PERCOCET)  MG per tablet Take 1 tablet by mouth 4 times daily.       No current facility-administered

## 2025-04-30 ENCOUNTER — OFFICE VISIT (OUTPATIENT)
Dept: CARDIOLOGY CLINIC | Age: 80
End: 2025-04-30
Payer: MEDICARE

## 2025-04-30 VITALS
DIASTOLIC BLOOD PRESSURE: 76 MMHG | OXYGEN SATURATION: 95 % | WEIGHT: 315 LBS | BODY MASS INDEX: 44.8 KG/M2 | HEART RATE: 95 BPM | SYSTOLIC BLOOD PRESSURE: 134 MMHG

## 2025-04-30 DIAGNOSIS — I48.91 ATRIAL FIBRILLATION WITH RVR (HCC): Primary | ICD-10-CM

## 2025-04-30 PROCEDURE — 1036F TOBACCO NON-USER: CPT | Performed by: INTERNAL MEDICINE

## 2025-04-30 PROCEDURE — G8427 DOCREV CUR MEDS BY ELIG CLIN: HCPCS | Performed by: INTERNAL MEDICINE

## 2025-04-30 PROCEDURE — 1124F ACP DISCUSS-NO DSCNMKR DOCD: CPT | Performed by: INTERNAL MEDICINE

## 2025-04-30 PROCEDURE — G2211 COMPLEX E/M VISIT ADD ON: HCPCS | Performed by: INTERNAL MEDICINE

## 2025-04-30 PROCEDURE — G8417 CALC BMI ABV UP PARAM F/U: HCPCS | Performed by: INTERNAL MEDICINE

## 2025-04-30 PROCEDURE — 99214 OFFICE O/P EST MOD 30 MIN: CPT | Performed by: INTERNAL MEDICINE

## 2025-04-30 PROCEDURE — 1159F MED LIST DOCD IN RCRD: CPT | Performed by: INTERNAL MEDICINE

## 2025-04-30 RX ORDER — DILTIAZEM HYDROCHLORIDE 120 MG/1
120 CAPSULE, COATED, EXTENDED RELEASE ORAL DAILY
Qty: 30 CAPSULE | Refills: 5 | Status: SHIPPED | OUTPATIENT
Start: 2025-04-30

## 2025-04-30 NOTE — PROGRESS NOTES
wheezing, no sputum production.  GASTROINTESTINAL: No N/V/D. No abdominal pain, appetite loss, blood in stools.  GENITOURINARY: No dysuria, trouble voiding, or hematuria.  MUSCULOSKELETAL:  No gait disturbance, weakness or joint complaints.  NEUROLOGICAL: No headache, diplopia, change in muscle strength, numbness or tingling. No change in gait, balance, coordination, mood, affect, memory, mentation, behavior.  PSHYCH: No anxiety, loss of interest, change in sexual behavior, feelings of self-harm, or confusion.  ENDOCRINE: No excessive thirst, fluid intake, or urination. No tremor.  HEMATOLOGIC: No abnormal bruising or bleeding.  ALLERGY: No nasal congestion or hives.      Physical Examination:     Vitals:    04/30/25 1350   BP: 134/76   Pulse: 95   SpO2: 95%   Weight: (!) 145.7 kg (321 lb 3.2 oz)       Wt Readings from Last 3 Encounters:   04/30/25 (!) 145.7 kg (321 lb 3.2 oz)   03/13/25 (!) 150.6 kg (332 lb)   02/26/25 (!) 149.2 kg (329 lb)         General Appearance:  Alert, cooperative, no distress, appears stated age Appropriate weight   Head:  Normocephalic, without obvious abnormality, atraumatic   Neck: Supple, symmetrical, trachea midline, no adenopathy, thyroid: not enlarged, symmetric, no tenderness/mass/nodules, no carotid bruit   Lungs:   Clear to auscultation bilaterally, respirations unlabored   Chest Wall:  No tenderness or deformity   Heart:  Irregular rhythm, rate is tachy, S1, S2 normal, there is no murmur, there is no rub or gallop, cannot assess jvd, no bilateral lower extremity edema   Abdomen:   Soft, non-tender, bowel sounds active all four quadrants,  no masses, no organomegaly       Extremities: Extremities normal, atraumatic, no cyanosis   Pulses: 2+ and symmetric   Skin: Skin color, texture, turgor normal, no rashes or lesions   Pysch: Normal mood and affect   Neurologic: Normal gross motor and sensory exam.  Cranial nerves intact        Labs:     Lab Results   Component Value Date    WBC

## 2025-05-21 RX ORDER — DILTIAZEM HYDROCHLORIDE 120 MG/1
120 CAPSULE, COATED, EXTENDED RELEASE ORAL DAILY
Qty: 90 CAPSULE | Refills: 2 | Status: SHIPPED | OUTPATIENT
Start: 2025-05-21

## 2025-05-21 NOTE — TELEPHONE ENCOUNTER
New Pharmacy Request for Refills   Requested Prescriptions     Pending Prescriptions Disp Refills    dilTIAZem (CARDIZEM CD) 120 MG extended release capsule 90 capsule 2     Sig: Take 1 capsule by mouth daily            Checked Correct Pharmacy: Yes  Any changes since last refill? No     Number: 90  Refills: 2    Last OV: 2025 Provider: DELANO  Next OV: 2025 Provider: Dr. lCeopatra LUNA      Last EK2025      Last Labs: 2024    BMP:  Lab Results   Component Value Date/Time     2023 01:52 PM    K 4.3 2023 01:52 PM    K 4.2 07/15/2021 02:49 AM    CL 97 2023 01:52 PM    CO2 31 2023 01:52 PM    BUN 57 2023 01:52 PM    CREATININE 2.1 2023 01:52 PM    GLUCOSE 188 2023 01:52 PM    CALCIUM 9.2 2023 01:52 PM    LABGLOM 31 2023 01:52 PM

## 2025-06-11 ENCOUNTER — TELEPHONE (OUTPATIENT)
Dept: CARDIOLOGY CLINIC | Age: 80
End: 2025-06-11

## 2025-06-11 DIAGNOSIS — Z01.818 PRE-OP TESTING: ICD-10-CM

## 2025-06-11 DIAGNOSIS — I48.0 PAROXYSMAL A-FIB (HCC): Primary | ICD-10-CM

## 2025-07-10 ENCOUNTER — TELEPHONE (OUTPATIENT)
Dept: CARDIOLOGY CLINIC | Age: 80
End: 2025-07-10

## 2025-07-10 NOTE — TELEPHONE ENCOUNTER
Cardiac Risk Assessment  for Procedures and Surgery    What type of procedure are you having: Colonoscopy    When is your procedure scheduled for: 7/23/25    What physician is performing your procedure: Dr. Rolf Cowan    Phone Number: [phone]    Fax number to send the letter: [phone]      Cardiologist Dr. Benoit     Last Appointment: 4/30/25    Next Appointment: 7/18/25    Are you on any blood thinners:  Eliquis 5mg- hold 2 days prior                  If yes what?    History of Coronary Artery Disease:    Last Stress test:    Last echo:    Device type and :

## 2025-07-16 NOTE — PROGRESS NOTES
morning (before breakfast) 7/20/21 7/18/25 Yes Tahira Pérez MD   pioglitazone (ACTOS) 45 MG tablet Take 1 tablet by mouth daily 1/14/20  Yes ProviderTeofilo MD   oxyCODONE-acetaminophen (PERCOCET)  MG per tablet Take 1 tablet by mouth 4 times daily.   Yes ProviderTeofilo MD        CURRENT Medications:  No current facility-administered medications for this visit.      Allergies:  Patient has no known allergies.           Review of Systems: All reviewed and refer to HPI  Constitutional: no unanticipated weight loss. There's been no change in energy level, sleep pattern, or activity level. No fevers, chills.   Eyes: No visual changes or diplopia. No scleral icterus.  ENT: No Headaches, hearing loss or vertigo. No mouth sores or sore throat.  Cardiovascular: No Chest pain, tightness or discomfort.   No Shortness of breath.   No Dyspnea on exertion, Orthopnea, Paroxysmal nocturnal dyspnea or breathlessness at rest.  No Palpitations.  No Syncope ('blackouts', 'faints', 'collapse') or dizziness.   Respiratory: No cough or wheezing, no sputum production. No hematemesis.    Gastrointestinal: No abdominal pain, appetite loss, blood in stools. No change in bowel or bladder habits.  Genitourinary: No dysuria, trouble voiding, or hematuria.  Musculoskeletal:  No gait disturbance, no joint complaints.  Integumentary: No rash or pruritis.  Neurological: No headache, diplopia, change in muscle strength, numbness or tingling.   Psychiatric: No anxiety or depression.  Endocrine: No temperature intolerance. No excessive thirst, fluid intake, or urination. No tremor.  Hematologic/Lymphatic: No abnormal bruising or bleeding, blood clots or swollen lymph nodes.  Allergic/Immunologic: No nasal congestion or hives.      Objective: all reveiwed      PHYSICAL EXAM:      Vitals:    07/18/25 1306   BP: 106/62   Pulse: (!) 103    Weight - Scale: (!) 148 kg (326 lb 3.2 oz)       General Appearance:  Alert,

## 2025-07-18 ENCOUNTER — OFFICE VISIT (OUTPATIENT)
Dept: CARDIOLOGY CLINIC | Age: 80
End: 2025-07-18
Payer: MEDICARE

## 2025-07-18 VITALS
WEIGHT: 315 LBS | HEART RATE: 103 BPM | DIASTOLIC BLOOD PRESSURE: 62 MMHG | SYSTOLIC BLOOD PRESSURE: 106 MMHG | BODY MASS INDEX: 45.5 KG/M2

## 2025-07-18 DIAGNOSIS — I48.0 PAF (PAROXYSMAL ATRIAL FIBRILLATION) (HCC): Primary | ICD-10-CM

## 2025-07-18 DIAGNOSIS — I50.32 CHRONIC DIASTOLIC HEART FAILURE (HCC): ICD-10-CM

## 2025-07-18 PROCEDURE — 1159F MED LIST DOCD IN RCRD: CPT | Performed by: INTERNAL MEDICINE

## 2025-07-18 PROCEDURE — G8417 CALC BMI ABV UP PARAM F/U: HCPCS | Performed by: INTERNAL MEDICINE

## 2025-07-18 PROCEDURE — 1036F TOBACCO NON-USER: CPT | Performed by: INTERNAL MEDICINE

## 2025-07-18 PROCEDURE — G8427 DOCREV CUR MEDS BY ELIG CLIN: HCPCS | Performed by: INTERNAL MEDICINE

## 2025-07-18 PROCEDURE — 99215 OFFICE O/P EST HI 40 MIN: CPT | Performed by: INTERNAL MEDICINE

## 2025-07-18 PROCEDURE — 1124F ACP DISCUSS-NO DSCNMKR DOCD: CPT | Performed by: INTERNAL MEDICINE

## 2025-07-18 RX ORDER — ONDANSETRON 4 MG/1
4 TABLET, ORALLY DISINTEGRATING ORAL EVERY 8 HOURS PRN
COMMUNITY
Start: 2025-07-17

## 2025-07-18 RX ORDER — CHLORHEXIDINE GLUCONATE 40 MG/ML
SOLUTION TOPICAL ONCE
Qty: 1 BOTTLE | Refills: 0 | Status: SHIPPED | OUTPATIENT
Start: 2025-07-18 | End: 2025-07-18

## 2025-07-18 RX ORDER — BLOOD SUGAR DIAGNOSTIC
STRIP MISCELLANEOUS
COMMUNITY
Start: 2025-05-14

## 2025-07-18 RX ORDER — CEPHALEXIN 500 MG/1
CAPSULE ORAL
COMMUNITY
Start: 2025-07-07

## 2025-08-04 ENCOUNTER — HOSPITAL ENCOUNTER (OUTPATIENT)
Age: 80
Discharge: HOME | End: 2025-08-06
Attending: INTERNAL MEDICINE
Payer: MEDICARE

## 2025-08-04 VITALS — HEIGHT: 71 IN | BODY MASS INDEX: 44.1 KG/M2 | WEIGHT: 315 LBS

## 2025-08-04 DIAGNOSIS — Z01.818 PRE-OP TESTING: ICD-10-CM

## 2025-08-04 DIAGNOSIS — I48.0 PAROXYSMAL A-FIB (HCC): ICD-10-CM

## 2025-08-04 LAB
ECHO AO ASC DIAM: 3.4 CM
ECHO AO ASCENDING AORTA INDEX: 1.31 CM/M2
ECHO AO ROOT DIAM: 4 CM
ECHO AO ROOT INDEX: 1.54 CM/M2
ECHO AV AREA PEAK VELOCITY: 3.2 CM2
ECHO AV AREA VTI: 3 CM2
ECHO AV AREA/BSA PEAK VELOCITY: 1.2 CM2/M2
ECHO AV AREA/BSA VTI: 1.2 CM2/M2
ECHO AV MEAN GRADIENT: 3 MMHG
ECHO AV MEAN VELOCITY: 0.8 M/S
ECHO AV PEAK GRADIENT: 6 MMHG
ECHO AV PEAK VELOCITY: 1.2 M/S
ECHO AV VELOCITY RATIO: 0.83
ECHO AV VTI: 24.1 CM
ECHO BSA: 2.72 M2
ECHO LA AREA 2C: 27.4 CM2
ECHO LA AREA 4C: 27.5 CM2
ECHO LA MAJOR AXIS: 7 CM
ECHO LA MINOR AXIS: 7.1 CM
ECHO LA VOL BP: 85 ML (ref 18–58)
ECHO LA VOL MOD A2C: 85 ML (ref 18–58)
ECHO LA VOL MOD A4C: 84 ML (ref 18–58)
ECHO LA VOL/BSA BIPLANE: 33 ML/M2 (ref 16–34)
ECHO LA VOLUME INDEX MOD A2C: 33 ML/M2 (ref 16–34)
ECHO LA VOLUME INDEX MOD A4C: 32 ML/M2 (ref 16–34)
ECHO LV EDV A2C: 121 ML
ECHO LV EDV A4C: 156 ML
ECHO LV EDV INDEX A4C: 60 ML/M2
ECHO LV EDV NDEX A2C: 47 ML/M2
ECHO LV EF PHYSICIAN: 57 %
ECHO LV EJECTION FRACTION A2C: 52 %
ECHO LV EJECTION FRACTION A4C: 61 %
ECHO LV EJECTION FRACTION BIPLANE: 57 % (ref 55–100)
ECHO LV ESV A2C: 58 ML
ECHO LV ESV A4C: 61 ML
ECHO LV ESV INDEX A2C: 22 ML/M2
ECHO LV ESV INDEX A4C: 23 ML/M2
ECHO LV FRACTIONAL SHORTENING: 33 % (ref 28–44)
ECHO LV INTERNAL DIMENSION DIASTOLE INDEX: 2.08 CM/M2
ECHO LV INTERNAL DIMENSION DIASTOLIC: 5.4 CM (ref 4.2–5.9)
ECHO LV INTERNAL DIMENSION SYSTOLIC INDEX: 1.38 CM/M2
ECHO LV INTERNAL DIMENSION SYSTOLIC: 3.6 CM
ECHO LV IVSD: 1 CM (ref 0.6–1)
ECHO LV MASS 2D: 220.6 G (ref 88–224)
ECHO LV MASS INDEX 2D: 84.8 G/M2 (ref 49–115)
ECHO LV POSTERIOR WALL DIASTOLIC: 1.1 CM (ref 0.6–1)
ECHO LV RELATIVE WALL THICKNESS RATIO: 0.41
ECHO LVOT AREA: 3.8 CM2
ECHO LVOT AV VTI INDEX: 0.78
ECHO LVOT DIAM: 2.2 CM
ECHO LVOT MEAN GRADIENT: 2 MMHG
ECHO LVOT PEAK GRADIENT: 4 MMHG
ECHO LVOT PEAK VELOCITY: 1 M/S
ECHO LVOT STROKE VOLUME INDEX: 27.5 ML/M2
ECHO LVOT SV: 71.4 ML
ECHO LVOT VTI: 18.8 CM
ECHO MV AREA VTI: 2.7 CM2
ECHO MV LVOT VTI INDEX: 1.43
ECHO MV MAX VELOCITY: 1.2 M/S
ECHO MV MEAN GRADIENT: 3 MMHG
ECHO MV MEAN VELOCITY: 0.8 M/S
ECHO MV PEAK GRADIENT: 6 MMHG
ECHO MV REGURGITANT PEAK GRADIENT: 74 MMHG
ECHO MV REGURGITANT PEAK VELOCITY: 4.3 M/S
ECHO MV VTI: 26.8 CM
ECHO PV ACCELERATION TIME (AT): 90 MS
ECHO PV MAX VELOCITY: 0.9 M/S
ECHO PV MEAN GRADIENT: 2 MMHG
ECHO PV MEAN VELOCITY: 0.6 M/S
ECHO PV PEAK GRADIENT: 3 MMHG
ECHO PV VTI: 19.5 CM
ECHO RA AREA 4C: 30.7 CM2
ECHO RA END SYSTOLIC VOLUME APICAL 4 CHAMBER INDEX BSA: 38 ML/M2
ECHO RA VOLUME: 99 ML
ECHO RV BASAL DIMENSION: 4.7 CM
ECHO RV FREE WALL PEAK S': 11.3 CM/S
ECHO RV LONGITUDINAL DIMENSION: 9 CM
ECHO RV MID DIMENSION: 4.1 CM
ECHO RV TAPSE: 1.5 CM (ref 1.7–?)
ECHO TV REGURGITANT MAX VELOCITY: 2.87 M/S
ECHO TV REGURGITANT PEAK GRADIENT: 33 MMHG

## 2025-08-04 PROCEDURE — 93306 TTE W/DOPPLER COMPLETE: CPT | Performed by: INTERNAL MEDICINE

## 2025-08-04 PROCEDURE — 93306 TTE W/DOPPLER COMPLETE: CPT

## 2025-08-12 ENCOUNTER — TELEPHONE (OUTPATIENT)
Dept: CARDIOLOGY CLINIC | Age: 80
End: 2025-08-12

## 2025-08-12 DIAGNOSIS — N39.0 URINARY TRACT INFECTION WITHOUT HEMATURIA, SITE UNSPECIFIED: Primary | ICD-10-CM

## 2025-08-12 DIAGNOSIS — Z01.818 PRE-OP TESTING: ICD-10-CM

## 2025-08-12 DIAGNOSIS — I48.0 PAROXYSMAL A-FIB (HCC): ICD-10-CM

## 2025-08-12 LAB
ABO/RH: NORMAL
ANION GAP SERPL CALCULATED.3IONS-SCNC: 11 MMOL/L (ref 3–16)
ANTIBODY SCREEN: NORMAL
BUN SERPL-MCNC: 47 MG/DL (ref 7–20)
CALCIUM SERPL-MCNC: 8.7 MG/DL (ref 8.3–10.6)
CHLORIDE SERPL-SCNC: 99 MMOL/L (ref 99–110)
CO2 SERPL-SCNC: 32 MMOL/L (ref 21–32)
CREAT SERPL-MCNC: 2 MG/DL (ref 0.8–1.3)
DEPRECATED RDW RBC AUTO: 13.9 % (ref 12.4–15.4)
GFR SERPLBLD CREATININE-BSD FMLA CKD-EPI: 33 ML/MIN/{1.73_M2}
GLUCOSE SERPL-MCNC: 232 MG/DL (ref 70–99)
HCT VFR BLD AUTO: 31.6 % (ref 40.5–52.5)
HGB BLD-MCNC: 11 G/DL (ref 13.5–17.5)
MCH RBC QN AUTO: 36.1 PG (ref 26–34)
MCHC RBC AUTO-ENTMCNC: 34.7 G/DL (ref 31–36)
MCV RBC AUTO: 104 FL (ref 80–100)
PLATELET # BLD AUTO: 227 K/UL (ref 135–450)
PMV BLD AUTO: 8 FL (ref 5–10.5)
POTASSIUM SERPL-SCNC: 4.2 MMOL/L (ref 3.5–5.1)
RBC # BLD AUTO: 3.04 M/UL (ref 4.2–5.9)
SODIUM SERPL-SCNC: 142 MMOL/L (ref 136–145)
WBC # BLD AUTO: 4 K/UL (ref 4–11)

## 2025-08-18 DIAGNOSIS — I48.0 PAROXYSMAL A-FIB (HCC): ICD-10-CM

## 2025-08-18 DIAGNOSIS — Z01.818 PRE-OP TESTING: ICD-10-CM

## 2025-08-18 LAB
BACTERIA URNS QL MICRO: ABNORMAL /HPF
BILIRUB UR QL STRIP.AUTO: NEGATIVE
CLARITY UR: CLEAR
COLOR UR: YELLOW
EPI CELLS #/AREA URNS AUTO: 1 /HPF (ref 0–5)
GLUCOSE UR STRIP.AUTO-MCNC: 100 MG/DL
HGB UR QL STRIP.AUTO: NEGATIVE
HYALINE CASTS #/AREA URNS AUTO: 1 /LPF (ref 0–8)
KETONES UR STRIP.AUTO-MCNC: NEGATIVE MG/DL
LEUKOCYTE ESTERASE UR QL STRIP.AUTO: ABNORMAL
NITRITE UR QL STRIP.AUTO: POSITIVE
PH UR STRIP.AUTO: 7 [PH] (ref 5–8)
PROT UR STRIP.AUTO-MCNC: NEGATIVE MG/DL
RBC CLUMPS #/AREA URNS AUTO: 1 /HPF (ref 0–4)
SP GR UR STRIP.AUTO: 1.02 (ref 1–1.03)
UA DIPSTICK W REFLEX MICRO PNL UR: YES
URN SPEC COLLECT METH UR: ABNORMAL
UROBILINOGEN UR STRIP-ACNC: 2 E.U./DL
WBC #/AREA URNS AUTO: 49 /HPF (ref 0–5)

## 2025-08-19 ENCOUNTER — OFFICE VISIT (OUTPATIENT)
Dept: CARDIOLOGY CLINIC | Age: 80
End: 2025-08-19
Payer: MEDICARE

## 2025-08-19 VITALS
HEART RATE: 66 BPM | SYSTOLIC BLOOD PRESSURE: 118 MMHG | DIASTOLIC BLOOD PRESSURE: 70 MMHG | WEIGHT: 315 LBS | HEIGHT: 71 IN | BODY MASS INDEX: 44.1 KG/M2

## 2025-08-19 DIAGNOSIS — E78.2 MIXED HYPERLIPIDEMIA: ICD-10-CM

## 2025-08-19 DIAGNOSIS — I50.32 CHRONIC HEART FAILURE WITH PRESERVED EJECTION FRACTION (HFPEF) (HCC): ICD-10-CM

## 2025-08-19 DIAGNOSIS — I10 PRIMARY HYPERTENSION: ICD-10-CM

## 2025-08-19 DIAGNOSIS — I48.0 PAROXYSMAL A-FIB (HCC): Primary | ICD-10-CM

## 2025-08-19 PROCEDURE — 1124F ACP DISCUSS-NO DSCNMKR DOCD: CPT | Performed by: NURSE PRACTITIONER

## 2025-08-19 PROCEDURE — G8427 DOCREV CUR MEDS BY ELIG CLIN: HCPCS | Performed by: NURSE PRACTITIONER

## 2025-08-19 PROCEDURE — 99214 OFFICE O/P EST MOD 30 MIN: CPT | Performed by: NURSE PRACTITIONER

## 2025-08-19 PROCEDURE — 1036F TOBACCO NON-USER: CPT | Performed by: NURSE PRACTITIONER

## 2025-08-19 PROCEDURE — 3074F SYST BP LT 130 MM HG: CPT | Performed by: NURSE PRACTITIONER

## 2025-08-19 PROCEDURE — G8417 CALC BMI ABV UP PARAM F/U: HCPCS | Performed by: NURSE PRACTITIONER

## 2025-08-19 PROCEDURE — 1159F MED LIST DOCD IN RCRD: CPT | Performed by: NURSE PRACTITIONER

## 2025-08-19 PROCEDURE — 3078F DIAST BP <80 MM HG: CPT | Performed by: NURSE PRACTITIONER

## 2025-08-20 RX ORDER — CIPROFLOXACIN 250 MG/1
250 TABLET, FILM COATED ORAL 2 TIMES DAILY
Qty: 10 TABLET | Refills: 0 | Status: ON HOLD | OUTPATIENT
Start: 2025-08-20 | End: 2025-08-25

## 2025-08-25 ENCOUNTER — HOSPITAL ENCOUNTER (OUTPATIENT)
Age: 80
Discharge: HOME | End: 2025-08-25
Attending: INTERNAL MEDICINE | Admitting: INTERNAL MEDICINE
Payer: MEDICARE

## 2025-08-25 ENCOUNTER — APPOINTMENT (OUTPATIENT)
Age: 80
End: 2025-08-25
Attending: INTERNAL MEDICINE
Payer: MEDICARE

## 2025-08-25 DIAGNOSIS — I48.0 PAROXYSMAL ATRIAL FIBRILLATION (HCC): ICD-10-CM

## 2025-08-25 DIAGNOSIS — Z01.818 PRE-OP TESTING: ICD-10-CM

## 2025-08-25 DIAGNOSIS — I48.0 PAROXYSMAL A-FIB (HCC): ICD-10-CM

## 2025-08-25 LAB
EKG DIAGNOSIS: NORMAL
EKG Q-T INTERVAL: 382 MS
EKG QRS DURATION: 98 MS
EKG QTC CALCULATION (BAZETT): 440 MS
EKG R AXIS: 70 DEGREES
EKG T AXIS: 33 DEGREES
EKG VENTRICULAR RATE: 80 BPM

## 2025-08-25 PROCEDURE — 93010 ELECTROCARDIOGRAM REPORT: CPT | Performed by: INTERNAL MEDICINE

## 2025-08-25 PROCEDURE — 93005 ELECTROCARDIOGRAM TRACING: CPT

## 2025-08-25 PROCEDURE — 93005 ELECTROCARDIOGRAM TRACING: CPT | Performed by: INTERNAL MEDICINE

## 2025-08-25 RX ORDER — SODIUM CHLORIDE 9 MG/ML
INJECTION, SOLUTION INTRAVENOUS CONTINUOUS
Status: DISCONTINUED | OUTPATIENT
Start: 2025-08-25 | End: 2025-08-29 | Stop reason: HOSPADM

## 2025-08-29 ENCOUNTER — OFFICE VISIT (OUTPATIENT)
Dept: CARDIOLOGY CLINIC | Age: 80
End: 2025-08-29
Payer: MEDICARE

## 2025-08-29 VITALS
SYSTOLIC BLOOD PRESSURE: 122 MMHG | HEART RATE: 88 BPM | WEIGHT: 315 LBS | BODY MASS INDEX: 46.03 KG/M2 | DIASTOLIC BLOOD PRESSURE: 70 MMHG

## 2025-08-29 DIAGNOSIS — I87.312 STASIS EDEMA WITH ULCER OF LEFT LOWER EXTREMITY (HCC): Primary | ICD-10-CM

## 2025-08-29 DIAGNOSIS — I48.91 ATRIAL FIBRILLATION WITH RVR (HCC): ICD-10-CM

## 2025-08-29 DIAGNOSIS — I48.19 PERSISTENT ATRIAL FIBRILLATION (HCC): ICD-10-CM

## 2025-08-29 DIAGNOSIS — N17.9 AKI (ACUTE KIDNEY INJURY): ICD-10-CM

## 2025-08-29 DIAGNOSIS — L97.929 STASIS EDEMA WITH ULCER OF LEFT LOWER EXTREMITY (HCC): Primary | ICD-10-CM

## 2025-08-29 PROCEDURE — G8427 DOCREV CUR MEDS BY ELIG CLIN: HCPCS | Performed by: NURSE PRACTITIONER

## 2025-08-29 PROCEDURE — 1124F ACP DISCUSS-NO DSCNMKR DOCD: CPT | Performed by: NURSE PRACTITIONER

## 2025-08-29 PROCEDURE — 99215 OFFICE O/P EST HI 40 MIN: CPT | Performed by: NURSE PRACTITIONER

## 2025-08-29 PROCEDURE — 1036F TOBACCO NON-USER: CPT | Performed by: NURSE PRACTITIONER

## 2025-08-29 PROCEDURE — 1160F RVW MEDS BY RX/DR IN RCRD: CPT | Performed by: NURSE PRACTITIONER

## 2025-08-29 PROCEDURE — 1159F MED LIST DOCD IN RCRD: CPT | Performed by: NURSE PRACTITIONER

## 2025-08-29 PROCEDURE — G8417 CALC BMI ABV UP PARAM F/U: HCPCS | Performed by: NURSE PRACTITIONER

## 2025-08-29 RX ORDER — CEPHALEXIN 500 MG/1
500 CAPSULE ORAL 3 TIMES DAILY
Qty: 30 CAPSULE | Refills: 0 | Status: SHIPPED | OUTPATIENT
Start: 2025-08-29 | End: 2025-09-08